# Patient Record
Sex: MALE | Race: ASIAN | NOT HISPANIC OR LATINO | URBAN - METROPOLITAN AREA
[De-identification: names, ages, dates, MRNs, and addresses within clinical notes are randomized per-mention and may not be internally consistent; named-entity substitution may affect disease eponyms.]

---

## 2019-01-23 ENCOUNTER — INPATIENT (INPATIENT)
Facility: HOSPITAL | Age: 69
LOS: 4 days | Discharge: HOME | End: 2019-01-28
Attending: INTERNAL MEDICINE | Admitting: INTERNAL MEDICINE
Payer: COMMERCIAL

## 2019-01-23 VITALS
HEART RATE: 119 BPM | TEMPERATURE: 98 F | SYSTOLIC BLOOD PRESSURE: 154 MMHG | DIASTOLIC BLOOD PRESSURE: 67 MMHG | RESPIRATION RATE: 16 BRPM | OXYGEN SATURATION: 100 %

## 2019-01-23 DIAGNOSIS — R91.8 OTHER NONSPECIFIC ABNORMAL FINDING OF LUNG FIELD: ICD-10-CM

## 2019-01-23 DIAGNOSIS — D64.9 ANEMIA, UNSPECIFIED: ICD-10-CM

## 2019-01-23 DIAGNOSIS — M54.9 DORSALGIA, UNSPECIFIED: ICD-10-CM

## 2019-01-23 DIAGNOSIS — Z29.9 ENCOUNTER FOR PROPHYLACTIC MEASURES, UNSPECIFIED: ICD-10-CM

## 2019-01-23 DIAGNOSIS — Z90.49 ACQUIRED ABSENCE OF OTHER SPECIFIED PARTS OF DIGESTIVE TRACT: Chronic | ICD-10-CM

## 2019-01-23 DIAGNOSIS — E83.52 HYPERCALCEMIA: ICD-10-CM

## 2019-01-23 LAB
ALBUMIN SERPL ELPH-MCNC: 3.2 G/DL — LOW (ref 3.3–5)
ALP SERPL-CCNC: 321 U/L — HIGH (ref 40–120)
ALT FLD-CCNC: 23 U/L — SIGNIFICANT CHANGE UP (ref 4–41)
ANION GAP SERPL CALC-SCNC: 12 MMO/L — SIGNIFICANT CHANGE UP (ref 7–14)
APPEARANCE UR: SIGNIFICANT CHANGE UP
APTT BLD: 35.3 SEC — SIGNIFICANT CHANGE UP (ref 27.5–36.3)
AST SERPL-CCNC: 21 U/L — SIGNIFICANT CHANGE UP (ref 4–40)
BACTERIA # UR AUTO: SIGNIFICANT CHANGE UP
BASOPHILS # BLD AUTO: 0.05 K/UL — SIGNIFICANT CHANGE UP (ref 0–0.2)
BASOPHILS NFR BLD AUTO: 0.5 % — SIGNIFICANT CHANGE UP (ref 0–2)
BILIRUB SERPL-MCNC: 0.7 MG/DL — SIGNIFICANT CHANGE UP (ref 0.2–1.2)
BILIRUB UR-MCNC: NEGATIVE — SIGNIFICANT CHANGE UP
BLOOD UR QL VISUAL: SIGNIFICANT CHANGE UP
BUN SERPL-MCNC: 17 MG/DL — SIGNIFICANT CHANGE UP (ref 7–23)
CALCIUM SERPL-MCNC: 11.5 MG/DL — HIGH (ref 8.4–10.5)
CHLORIDE SERPL-SCNC: 97 MMOL/L — LOW (ref 98–107)
CO2 SERPL-SCNC: 25 MMOL/L — SIGNIFICANT CHANGE UP (ref 22–31)
COLOR SPEC: YELLOW — SIGNIFICANT CHANGE UP
CREAT SERPL-MCNC: 0.88 MG/DL — SIGNIFICANT CHANGE UP (ref 0.5–1.3)
CRP SERPL-MCNC: 133.6 MG/L — HIGH
EOSINOPHIL # BLD AUTO: 0.13 K/UL — SIGNIFICANT CHANGE UP (ref 0–0.5)
EOSINOPHIL NFR BLD AUTO: 1.3 % — SIGNIFICANT CHANGE UP (ref 0–6)
ERYTHROCYTE [SEDIMENTATION RATE] IN BLOOD: 115 MM/HR — HIGH (ref 1–15)
GLUCOSE SERPL-MCNC: 136 MG/DL — HIGH (ref 70–99)
GLUCOSE UR-MCNC: NEGATIVE — SIGNIFICANT CHANGE UP
HCT VFR BLD CALC: 27.7 % — LOW (ref 39–50)
HGB BLD-MCNC: 7.7 G/DL — LOW (ref 13–17)
HYALINE CASTS # UR AUTO: SIGNIFICANT CHANGE UP
IMM GRANULOCYTES NFR BLD AUTO: 0.7 % — SIGNIFICANT CHANGE UP (ref 0–1.5)
INR BLD: 1.41 — HIGH (ref 0.88–1.17)
KETONES UR-MCNC: NEGATIVE — SIGNIFICANT CHANGE UP
LEUKOCYTE ESTERASE UR-ACNC: NEGATIVE — SIGNIFICANT CHANGE UP
LYMPHOCYTES # BLD AUTO: 1.9 K/UL — SIGNIFICANT CHANGE UP (ref 1–3.3)
LYMPHOCYTES # BLD AUTO: 18.6 % — SIGNIFICANT CHANGE UP (ref 13–44)
MCHC RBC-ENTMCNC: 20.9 PG — LOW (ref 27–34)
MCHC RBC-ENTMCNC: 27.8 % — LOW (ref 32–36)
MCV RBC AUTO: 75.1 FL — LOW (ref 80–100)
MONOCYTES # BLD AUTO: 0.85 K/UL — SIGNIFICANT CHANGE UP (ref 0–0.9)
MONOCYTES NFR BLD AUTO: 8.3 % — SIGNIFICANT CHANGE UP (ref 2–14)
NEUTROPHILS # BLD AUTO: 7.19 K/UL — SIGNIFICANT CHANGE UP (ref 1.8–7.4)
NEUTROPHILS NFR BLD AUTO: 70.6 % — SIGNIFICANT CHANGE UP (ref 43–77)
NITRITE UR-MCNC: NEGATIVE — SIGNIFICANT CHANGE UP
NRBC # FLD: 0 K/UL — LOW (ref 25–125)
PH UR: 6.5 — SIGNIFICANT CHANGE UP (ref 5–8)
PLATELET # BLD AUTO: 540 K/UL — HIGH (ref 150–400)
PMV BLD: 9.2 FL — SIGNIFICANT CHANGE UP (ref 7–13)
POTASSIUM SERPL-MCNC: 4.1 MMOL/L — SIGNIFICANT CHANGE UP (ref 3.5–5.3)
POTASSIUM SERPL-SCNC: 4.1 MMOL/L — SIGNIFICANT CHANGE UP (ref 3.5–5.3)
PROT SERPL-MCNC: 8.6 G/DL — HIGH (ref 6–8.3)
PROT UR-MCNC: 100 — HIGH
PROTHROM AB SERPL-ACNC: 15.8 SEC — HIGH (ref 9.8–13.1)
RBC # BLD: 3.69 M/UL — LOW (ref 4.2–5.8)
RBC # FLD: 18.1 % — HIGH (ref 10.3–14.5)
RBC CASTS # UR COMP ASSIST: SIGNIFICANT CHANGE UP (ref 0–?)
SODIUM SERPL-SCNC: 134 MMOL/L — LOW (ref 135–145)
SP GR SPEC: 1.02 — SIGNIFICANT CHANGE UP (ref 1–1.04)
SQUAMOUS # UR AUTO: SIGNIFICANT CHANGE UP
TSH SERPL-MCNC: 0.53 UIU/ML — SIGNIFICANT CHANGE UP (ref 0.27–4.2)
UROBILINOGEN FLD QL: SIGNIFICANT CHANGE UP
WBC # BLD: 10.19 K/UL — SIGNIFICANT CHANGE UP (ref 3.8–10.5)
WBC # FLD AUTO: 10.19 K/UL — SIGNIFICANT CHANGE UP (ref 3.8–10.5)
WBC UR QL: HIGH (ref 0–?)

## 2019-01-23 PROCEDURE — 71250 CT THORAX DX C-: CPT | Mod: 26

## 2019-01-23 PROCEDURE — 74177 CT ABD & PELVIS W/CONTRAST: CPT | Mod: 26

## 2019-01-23 PROCEDURE — 71046 X-RAY EXAM CHEST 2 VIEWS: CPT | Mod: 26

## 2019-01-23 PROCEDURE — 99223 1ST HOSP IP/OBS HIGH 75: CPT | Mod: GC

## 2019-01-23 RX ORDER — ENOXAPARIN SODIUM 100 MG/ML
40 INJECTION SUBCUTANEOUS DAILY
Qty: 0 | Refills: 0 | Status: DISCONTINUED | OUTPATIENT
Start: 2019-01-23 | End: 2019-01-25

## 2019-01-23 RX ORDER — SODIUM CHLORIDE 9 MG/ML
1000 INJECTION INTRAMUSCULAR; INTRAVENOUS; SUBCUTANEOUS ONCE
Qty: 0 | Refills: 0 | Status: COMPLETED | OUTPATIENT
Start: 2019-01-23 | End: 2019-01-23

## 2019-01-23 RX ORDER — ACETAMINOPHEN 500 MG
650 TABLET ORAL EVERY 6 HOURS
Qty: 0 | Refills: 0 | Status: DISCONTINUED | OUTPATIENT
Start: 2019-01-23 | End: 2019-01-28

## 2019-01-23 RX ADMIN — ENOXAPARIN SODIUM 40 MILLIGRAM(S): 100 INJECTION SUBCUTANEOUS at 21:32

## 2019-01-23 RX ADMIN — SODIUM CHLORIDE 1000 MILLILITER(S): 9 INJECTION INTRAMUSCULAR; INTRAVENOUS; SUBCUTANEOUS at 11:25

## 2019-01-23 NOTE — H&P ADULT - NSHPREVIEWOFSYSTEMS_GEN_ALL_CORE
REVIEW OF SYSTEMS:    CONSTITUTIONAL: No weakness, fatigue, malaise, fevers or chills, no weight change, appetite change  EYES: No visual changes; No double vision,  No vertigo, eye pain  Ears: no otalgia, no otorrhea, no hearing loss, tinnitus  Nose: no epistaxis, rhinorrhea, post-discharge, sinus pressure  Throat: no throat pain, no oral lesions, tooth pain   NECK: No pain or stiffness  RESPIRATORY: + productive cough; wheezing, hemoptysis; No shortness of breath, orthopnea, PND, CA, snoring  CARDIOVASCULAR: No chest pain or palpitations, no leg edema, no claudication    GASTROINTESTINAL: No abdominal or epigastric pain. No nausea, vomiting, or hematemesis; No diarrhea or constipation. No melena or hematochezia.  GENITOURINARY: No dysuria, frequency, urgency or hematuria, no pelvic pain, urinary incontinence, urgency  Musculoskeletal: no joints or muscle pain, no swelling in joints or muscles  NEUROLOGICAL: No numbness or weakness, headache, memory loss, seizures, dizziness, vertigo, syncope, ataxia  SKIN: No pruritis, rashes, lesions or new moles  Psych: No anxiety, sadness, insomnia, suicide thoughts  Endocrine: No Heat or Cold intolerance, polydipsia, polyphagia  Heme/Lymph: no LN enlargement, no easy bruising or bleeding REVIEW OF SYSTEMS:    CONSTITUTIONAL: +fatigue, +f/c , + weight change, + appetite change  EYES: No visual changes; No double vision,  No vertigo, eye pain  Ears: no otalgia, no otorrhea, no hearing loss, tinnitus  Nose: no epistaxis, rhinorrhea, post-discharge, sinus pressure  Throat: no throat pain, no oral lesions, tooth pain   NECK: No pain or stiffness  RESPIRATORY: + productive cough; no wheezing, hemoptysis; No shortness of breath, orthopnea, PND, CA, snoring  CARDIOVASCULAR: No chest pain or palpitations, no leg edema, no claudication    GASTROINTESTINAL: No abdominal or epigastric pain. No nausea, vomiting, or hematemesis; No diarrhea or constipation. No melena or hematochezia.  GENITOURINARY: + increased frequency, No dysuria, urgency or hematuria, no pelvic pain, urinary incontinence, urgency  Musculoskeletal: no joints or muscle pain, no swelling in joints or muscles  NEUROLOGICAL: No numbness or weakness, headache, memory loss, seizures, dizziness, vertigo, syncope, ataxia  SKIN: No pruritis, rashes, lesions or new moles  Psych: No anxiety, sadness, insomnia, suicide thoughts  Endocrine: No Heat or Cold intolerance, polydipsia, polyphagia  Heme/Lymph: no LN enlargement, no easy bruising or bleeding

## 2019-01-23 NOTE — ED PROVIDER NOTE - PROGRESS NOTE DETAILS
MACY Gan: Discussed with pt that his chest xray has findings concerning for pneumonia vs underlying issue, ordered for CT scan to further evaluate. Given lab and imaging results pt to be admitted for further workup. Discussed case with  hospitalist who accepts this pt to his service MACY aGn: [place] [phone], please call with any updates/when he should be picked up

## 2019-01-23 NOTE — H&P ADULT - ATTENDING COMMENTS
Patient seen and examined. Agree with above note by resident.    # Lung mass - likely malignant. Given weight loss, h/o smoking, cough, high suspicion for lung malignancy. Pulm evaluation for EBUS. CT abd/pel for staging.     # Hypercalcemia - suspect malignancy related. Asymptomatic. Monitor for now.     # Microcytic anemia - check iron studies. Patient should get colonoscopy as outpt.     Plan discussed with pt and hs

## 2019-01-23 NOTE — H&P ADULT - PROBLEM SELECTOR PLAN 3
Improve 0, will hold off on AC Pt w/ chronic dull infrascapular pain in setting of likely malignancy concerning for mets.  - f/u GGT  - f/u w/u of lung lesions  - pain control

## 2019-01-23 NOTE — ED PROVIDER NOTE - ATTENDING CONTRIBUTION TO CARE
Avila  pt [place]  pt  with 2 mos of back pain  cough wt loss    splenomegaly on exam   reported decreased po intake    Plan  labs include esr  CRP  coags  CXR [place]  pt  with 2 mos of back pain  cough wt loss    splenomegaly on exam   reported decreased po intake    Plan  labs include esr  CRP  coags  CXR    CXR  2 lesions lt base  atalectasis RUL  pulling up fissure (?endobronchial lesion)  CT chest ordered  admit for diagnostic w/u (?bronch)

## 2019-01-23 NOTE — H&P ADULT - PROBLEM SELECTOR PLAN 2
Pt w/ chronic dull infrascapular pain in setting of likely malignancy concerning for mets.  - alk phos and Ca elevated  - f/u GGT Ca 11.5 in setting of possible lung malignancy.  - asymptomatic, does not currently require intervention  - will monitor  - f/u PTH, PTHrP

## 2019-01-23 NOTE — H&P ADULT - HISTORY OF PRESENT ILLNESS
Pt is a 68 year old man w/ a PMHx GERD presenting w/ progressive back pain x4 months. Pt is a 68 year old man w/ a PMHx GERD, smoking presenting w/ progressive back pain x4 months. Pt states that the back pain is dull, intermittent, and located inferior to his right scapula, and occasinally wraps around to the side of his chest. Pt states that the pain has gotten worse over the past few months, and it worsens with movement. It frequently awakens him from sleep, but improves with two Advil liquid capsules. Pt also endorses multiple months of an intermittent cough productive of yellow sputum. Pt went to see a doctor 3 months ago for evaluation of the cough at which time his weight was >20 lbs less than his baseline. He becomes sob with activity but not at rest. Pt states he also has a decreased appetite. He also has occasional subjective fevers/chills. He has also had a recent polyuria, waking every 3 hours to urinate at night. The patient quit his job as a  three months ago on account of his symptoms. Pt denies cp, changes in BMs, dysuria, abdominal pain, HAs, n/v.     In the ED, VS: Temp 98, , /67, RR 16 SpO2 100%. , .6. Alk phos 321, Ca 11.5. CXR showed possible RUL PNA and multiple opacities. CT chest: RUL and LLL lesion, numerous opacities, hilar lymphadenopathy, b/l adrenal mets.

## 2019-01-23 NOTE — ED PROVIDER NOTE - OBJECTIVE STATEMENT
Pt  presenting with 2 mos of intermittent back pain not positional  not clearly related to food  Claims improves with po tagamet  but never goes away  Pt also notes persistent intermittent cough  occasionally productive  notes subjective fever and sweats  40 lb wt loss over the last several months  some decreased po  intake  no change in bowels  no vomiting   no new CA  no associated chest pain  ([place])

## 2019-01-23 NOTE — H&P ADULT - NSHPPHYSICALEXAM_GEN_ALL_CORE
PHYSICAL EXAM:  GENERAL: NAD, well-groomed, well-developed  HEAD:  Atraumatic, Normocephalic  EYES: EOMI, PERRLA, conjunctiva and sclera clear  ENMT: No tonsillar erythema, exudates, or enlargement; Moist mucous membranes  NECK: Supple, No JVD, Normal thyroid  HEART: Regular rate and rhythm; No murmurs, rubs, or gallops  RESPIRATORY: CTA B/L, No W/R/R  ABDOMEN: Soft, Nontender, Nondistended; Bowel sounds present  NEUROLOGY: A&Ox3, nonfocal, moving all extremities  EXTREMITIES:  2+ Peripheral Pulses, No clubbing, cyanosis, or edema  SKIN: warm, dry, normal color, no rash or abnormal lesions PHYSICAL EXAM:  GENERAL: NAD, well-groomed, frequently stops talking to cough  HEAD:  Atraumatic, Normocephalic  EYES: EOMI, PERRLA, conjunctiva and sclera clear  ENMT: No tonsillar erythema, exudates, or enlargement; Moist mucous membranes  NECK: Supple, No JVD, Normal thyroid  HEART: Regular rate and rhythm; No murmurs, rubs, or gallops  RESPIRATORY: RUL crackles  ABDOMEN: Soft, Nontender, Nondistended; Bowel sounds present. Enlarged spleen  NEUROLOGY: A&Ox3, nonfocal, moving all extremities  EXTREMITIES:  2+ Peripheral Pulses, No clubbing, cyanosis, or edema  SKIN: warm, dry, normal color, no rash or abnormal lesions  BACK: no localized tenderness to palpation

## 2019-01-23 NOTE — ED PROVIDER NOTE - MEDICAL DECISION MAKING DETAILS
pt with wt loss persistent cough abnl CXR  splenomegaly  concerning for CA  CT chest ordered  will admit for likely bronch/further treatment

## 2019-01-23 NOTE — H&P ADULT - NSHPLABSRESULTS_GEN_ALL_CORE
LABS:                         7.7    10.19 )-----------( 540      ( 2019 10:50 )             27.7         134<L>  |  97<L>  |  17  ----------------------------<  136<H>  4.1   |  25  |  0.88    Ca    11.5<H>      2019 10:50    TPro  8.6<H>  /  Alb  3.2<L>  /  TBili  0.7  /  DBili  x   /  AST  21  /  ALT  23  /  AlkPhos  321<H>      PT/INR - ( 2019 10:51 )   PT: 15.8 SEC;   INR: 1.41          PTT - ( 2019 10:51 )  PTT:35.3 SEC  Urinalysis Basic - ( 2019 10:55 )    Color: YELLOW / Appearance: Lt TURBID / S.023 / pH: 6.5  Gluc: NEGATIVE / Ketone: NEGATIVE  / Bili: NEGATIVE / Urobili: TRACE   Blood: TRACE / Protein: 100 / Nitrite: NEGATIVE   Leuk Esterase: NEGATIVE / RBC: 3-5 / WBC 6-10   Sq Epi: FEW / Non Sq Epi: x / Bacteria: FEW      EXAM:  XR CHEST PA LAT 2V        PROCEDURE DATE:  2019         INTERPRETATION:  TIME OF EXAM: 2019 at 11:34 AM    CLINICAL INFORMATION: Cough and weight loss    TECHNIQUE:   PA and lateral chest    INTERPRETATION:     There is mild elevation of the horizontal fissure above which is an   airspace opacity presumably representing pneumonia.    There are faint rounded opacities at the left lung base of uncertain   etiology. The heart is not enlarged and there are no effusions although   bilateral hilar appear somewhat prominent.    CT chest is recommended for further evaluation of what may be right upper   lobe pneumonia but better delineation of the suspected nodules at the   left lung base.      COMPARISON:  None available      IMPRESSION:  Possible right upper lobe pneumonia but additional opacities   are seen on the radiograph that are of uncertain etiology and CT chest is   recommended particularly in the presence of significant weight loss.

## 2019-01-23 NOTE — H&P ADULT - ASSESSMENT
Pt is a 68 year old man w/ a PMHx GERD presenting w/ progressive back pain x4 months. Pt is a 68 year old man w/ a PMHx GERD presenting w/ progressive back pain x4 months. CT chest showing large RUL mass and LLL mass, numerous b/l nodular opacities, and mediastinal hilar lymphadenopathy concerning for malignancy.

## 2019-01-23 NOTE — H&P ADULT - PROBLEM SELECTOR PLAN 1
Pt w/ new productive cough in setting of extensive smoking hx.   - CT chest shows large RUL mass w/ left lower lobe mass, innumerable bilateral nodular opacities, and mediastinal/bilateral hilar lymphadenopathy. Also w/ b/l adrenal metastases.   - primary lung malignancy vs mets  - will c/s pulm re possible EBUS Pt w/ new productive cough in setting of extensive smoking hx.   - CT chest shows large RUL mass w/ left lower lobe mass, innumerable bilateral nodular opacities, and mediastinal/bilateral hilar lymphadenopathy. Also w/ b/l adrenal metastases.   - primary lung malignancy vs mets  - will c/s pulm re possible EBUS   - CT A/P to evaluate extent of disease

## 2019-01-23 NOTE — H&P ADULT - NSHPSOCIALHISTORY_GEN_ALL_CORE
Pt has a 40 year smoking history. He currently smoked a pack per day. Pt stopped working as a  3 months ago on account of his symptoms. He is originally from [facility]. Lives alone. Pt has a 40 year smoking history. He currently smoked a pack per day. He is an occasional alcohol drinker. No illicit drug use.

## 2019-01-23 NOTE — H&P ADULT - PROBLEM SELECTOR PLAN 4
Improve 2, Lovenox  Diet: regular Microcytic. Pt has never had a colonoscopy.   - will f/u iron panel

## 2019-01-23 NOTE — ED ADULT NURSE NOTE - OBJECTIVE STATEMENT
Patient presents to room 29 with c/o 2 mos of intermittent back pain    Claims improves with po tagamet  but never goes away  Pt also notes persistent intermittent cough  occasionally productive  notes subjective fever and sweats  40 lb wt loss over the last several months  some decreased po  intake. Patient is alert and oriented times three. IVL placed to right AC 20 gauge and labs drawn and sent. ND infused as ordered and waiting for bed assignment.  DAJUAN Carrion

## 2019-01-24 LAB
ALBUMIN SERPL ELPH-MCNC: 2.8 G/DL — LOW (ref 3.3–5)
ALP SERPL-CCNC: 308 U/L — HIGH (ref 40–120)
ALT FLD-CCNC: 17 U/L — SIGNIFICANT CHANGE UP (ref 4–41)
ANION GAP SERPL CALC-SCNC: 13 MMO/L — SIGNIFICANT CHANGE UP (ref 7–14)
AST SERPL-CCNC: 21 U/L — SIGNIFICANT CHANGE UP (ref 4–40)
BASOPHILS # BLD AUTO: 0.05 K/UL — SIGNIFICANT CHANGE UP (ref 0–0.2)
BASOPHILS NFR BLD AUTO: 0.5 % — SIGNIFICANT CHANGE UP (ref 0–2)
BILIRUB SERPL-MCNC: 0.7 MG/DL — SIGNIFICANT CHANGE UP (ref 0.2–1.2)
BLD GP AB SCN SERPL QL: NEGATIVE — SIGNIFICANT CHANGE UP
BUN SERPL-MCNC: 15 MG/DL — SIGNIFICANT CHANGE UP (ref 7–23)
CALCIUM SERPL-MCNC: 10.3 MG/DL — SIGNIFICANT CHANGE UP (ref 8.4–10.5)
CHLORIDE SERPL-SCNC: 101 MMOL/L — SIGNIFICANT CHANGE UP (ref 98–107)
CO2 SERPL-SCNC: 22 MMOL/L — SIGNIFICANT CHANGE UP (ref 22–31)
CREAT SERPL-MCNC: 0.94 MG/DL — SIGNIFICANT CHANGE UP (ref 0.5–1.3)
EOSINOPHIL # BLD AUTO: 0.11 K/UL — SIGNIFICANT CHANGE UP (ref 0–0.5)
EOSINOPHIL NFR BLD AUTO: 1.1 % — SIGNIFICANT CHANGE UP (ref 0–6)
FERRITIN SERPL-MCNC: 1546 NG/ML — HIGH (ref 30–400)
GGT SERPL-CCNC: 110 U/L — HIGH (ref 8–61)
GLUCOSE SERPL-MCNC: 84 MG/DL — SIGNIFICANT CHANGE UP (ref 70–99)
HAPTOGLOB SERPL-MCNC: 446 MG/DL — HIGH (ref 34–200)
HCT VFR BLD CALC: 23.9 % — LOW (ref 39–50)
HCV AB S/CO SERPL IA: 0.28 S/CO — SIGNIFICANT CHANGE UP
HCV AB SERPL-IMP: SIGNIFICANT CHANGE UP
HGB BLD-MCNC: 7 G/DL — CRITICAL LOW (ref 13–17)
IMM GRANULOCYTES NFR BLD AUTO: 0.5 % — SIGNIFICANT CHANGE UP (ref 0–1.5)
IRON SATN MFR SERPL: 16 UG/DL — LOW (ref 45–165)
IRON SATN MFR SERPL: 170 UG/DL — SIGNIFICANT CHANGE UP (ref 155–535)
LDH SERPL L TO P-CCNC: 129 U/L — LOW (ref 135–225)
LYMPHOCYTES # BLD AUTO: 2.46 K/UL — SIGNIFICANT CHANGE UP (ref 1–3.3)
LYMPHOCYTES # BLD AUTO: 24.9 % — SIGNIFICANT CHANGE UP (ref 13–44)
MAGNESIUM SERPL-MCNC: 1.8 MG/DL — SIGNIFICANT CHANGE UP (ref 1.6–2.6)
MCHC RBC-ENTMCNC: 21.8 PG — LOW (ref 27–34)
MCHC RBC-ENTMCNC: 29.3 % — LOW (ref 32–36)
MCV RBC AUTO: 74.5 FL — LOW (ref 80–100)
MONOCYTES # BLD AUTO: 1.23 K/UL — HIGH (ref 0–0.9)
MONOCYTES NFR BLD AUTO: 12.4 % — SIGNIFICANT CHANGE UP (ref 2–14)
NEUTROPHILS # BLD AUTO: 5.98 K/UL — SIGNIFICANT CHANGE UP (ref 1.8–7.4)
NEUTROPHILS NFR BLD AUTO: 60.6 % — SIGNIFICANT CHANGE UP (ref 43–77)
NRBC # FLD: 0 K/UL — LOW (ref 25–125)
PHOSPHATE SERPL-MCNC: 3.5 MG/DL — SIGNIFICANT CHANGE UP (ref 2.5–4.5)
PLATELET # BLD AUTO: 472 K/UL — HIGH (ref 150–400)
PMV BLD: 9 FL — SIGNIFICANT CHANGE UP (ref 7–13)
POTASSIUM SERPL-MCNC: 4.3 MMOL/L — SIGNIFICANT CHANGE UP (ref 3.5–5.3)
POTASSIUM SERPL-SCNC: 4.3 MMOL/L — SIGNIFICANT CHANGE UP (ref 3.5–5.3)
PROT SERPL-MCNC: 6.6 G/DL — SIGNIFICANT CHANGE UP (ref 6–8.3)
PTH-INTACT SERPL-MCNC: 11.23 PG/ML — LOW (ref 15–65)
RBC # BLD: 3.21 M/UL — LOW (ref 4.2–5.8)
RBC # FLD: 18.4 % — HIGH (ref 10.3–14.5)
RETICS #: 80 K/UL — SIGNIFICANT CHANGE UP (ref 25–125)
RETICS/RBC NFR: 2.5 % — SIGNIFICANT CHANGE UP (ref 0.5–2.5)
RH IG SCN BLD-IMP: POSITIVE — SIGNIFICANT CHANGE UP
SODIUM SERPL-SCNC: 136 MMOL/L — SIGNIFICANT CHANGE UP (ref 135–145)
TRANSFERRIN SERPL-MCNC: 140 MG/DL — LOW (ref 200–360)
UIBC SERPL-MCNC: 154.1 UG/DL — SIGNIFICANT CHANGE UP (ref 110–370)
WBC # BLD: 9.88 K/UL — SIGNIFICANT CHANGE UP (ref 3.8–10.5)
WBC # FLD AUTO: 9.88 K/UL — SIGNIFICANT CHANGE UP (ref 3.8–10.5)

## 2019-01-24 PROCEDURE — 99222 1ST HOSP IP/OBS MODERATE 55: CPT

## 2019-01-24 PROCEDURE — 70553 MRI BRAIN STEM W/O & W/DYE: CPT | Mod: 26

## 2019-01-24 PROCEDURE — 99233 SBSQ HOSP IP/OBS HIGH 50: CPT | Mod: GC

## 2019-01-24 PROCEDURE — 99223 1ST HOSP IP/OBS HIGH 75: CPT | Mod: GC

## 2019-01-24 RX ADMIN — ENOXAPARIN SODIUM 40 MILLIGRAM(S): 100 INJECTION SUBCUTANEOUS at 12:07

## 2019-01-24 NOTE — PROGRESS NOTE ADULT - PROBLEM SELECTOR PLAN 3
Pt w/ chronic dull infrascapular pain in setting of likely malignancy concerning for mets.  - GGT elevated  - f/u w/u of lung lesions  - pain control

## 2019-01-24 NOTE — CONSULT NOTE ADULT - SUBJECTIVE AND OBJECTIVE BOX
CC: 68y old Male admitted with a chief complaint of R flank pain.      HPI: 67 yo male with a significant 45 pack year smoking history and PMH of GERD and appendicitis presents with cough and right sided flank pain. Patient states over the last few weeks he has been having persistent, dull-like pain in his lower back, primarily on the right side. Pain is consistent throughout the day and nothing makes it better or worse.  He has noticed an increase in urinary frequency and describes his urine as yellowish-clear. Also has been experiencing fever/chills, with associated night sweats for past few weeks. He has had a significant unintentional weight loss. Unsure of how much he has lost, but notes pants are now fitting too loose for him. He states he had his "kidney tests" done at his PCP several years ago and they were abnormal. PCP told him to discontinue alcohol consumption at the time and he has not drank alcohol since. Went to PCP for evaluation of cough. Diagnosed with Pneumonia and started on ABX. States cough resolved after course of antibiotics. Still experiencing some mild nasal congestion.   Denies hematuria, dysuria, abdominal pain, nausea/vomiting/diarrhea/constipation, headaches, visual changes, change in appetite or hemoptysis.    PMHx: GERD (gastroesophageal reflux disease)  Appendicitis    PSHx: History of appendectomy    Medications (inpatient): enoxaparin Injectable 40 milliGRAM(s) SubCutaneous daily    Medications (PRN):acetaminophen   Tablet .. 650 milliGRAM(s) Oral every 6 hours PRN    Allergies: No Known Allergies    Family History: +Ovarian CA in mother (Dx at 72)    Social Hx: Worked as a  with significant exposure to chemicals. + Tobacco Use: 45 pack years. No alcohol or illicit drug use.     Physical Exam  T(C): 36.9 (19 @ 14:53)  HR: 95 (19 @ 14:53) (95 - 104)  BP: 124/63 (19 @ 14:53) (111/59 - 133/60)  RR: 18 (19 @ 14:53) (17 - 18)  SpO2: 98% (19 @ 14:53) (96% - 100%)  Tmax: T(C): , Max: 36.9 (19 @ 14:53)    General: well developed, well nourished, NAD  Neuro: alert and oriented, no focal deficits, moves all extremities spontaneously  HEENT: NCAT, EOMI, anicteric, mucosa moist  Respiratory: airway patent, respirations unlabored  CVS: regular rate and rhythm  Abdomen: soft, nontender, nondistended. No CVA tenderness. + Palpable solid mass on left flank.  : No suprapubic tenderness. No tenderness to palpation to testes b/l. Palpable vas deferens b/l. Glans intact, no lesions. No discharge from meatus.   QI: No prostate tenderness. Prostate nonenlarged.   Extremities: no edema, sensation and movement grossly intact  Skin: warm, dry, appropriate color    Labs:                        7.0    9.88  )-----------( 472      ( 2019 06:00 )             23.9     PT/INR - ( 2019 10:51 )   PT: 15.8 SEC;   INR: 1.41          PTT - ( 2019 10:51 )  PTT:35.3 SEC      136  |  101  |  15  ----------------------------<  84  4.3   |  22  |  0.94    Ca    10.3      2019 06:00  Phos  3.5       Mg     1.8         TPro  6.6  /  Alb  2.8<L>  /  TBili  0.7  /  DBili  x   /  AST  21  /  ALT  17  /  AlkPhos  308<H>      Urinalysis Basic - ( 2019 10:55 )    Color: YELLOW / Appearance: Lt TURBID / S.023 / pH: 6.5  Gluc: NEGATIVE / Ketone: NEGATIVE  / Bili: NEGATIVE / Urobili: TRACE   Blood: TRACE / Protein: 100 / Nitrite: NEGATIVE   Leuk Esterase: NEGATIVE / RBC: 3-5 / WBC 6-10   Sq Epi: FEW / Non Sq Epi: x / Bacteria: FEW      Imaging and other studies:  < from: CT Abdomen and Pelvis w/ IV Cont (19 @ 22:49) >  FINDINGS:    LOWER CHEST: Numerous lung nodules and lung masses are again noted. There   is a necrotic appearing right paraesophageal lymph node measuring 1.5 x   1.3 cm.    LIVER: Within normal limits.  BILE DUCTS: Normal caliber.  GALLBLADDER: Contracted.   SPLEEN: Within normal limits.  PANCREAS: Within normal limits.  ADRENALS: Bilateral adrenal nodules measuring for example 2.0 cm on the   right side.  KIDNEYS/URETERS: Necrotic appearing mass inseparable from the lower pole   the left kidney measuring 10.7 x 11.1 x 8.4 cm (2, 62 and 602, 45).    BLADDER: Within normal limits.  REPRODUCTIVE ORGANS: The prostate gland and seminal vesicles appear   unremarkable.    BOWEL: No bowel obstruction.   PERITONEUM: No ascites.  VESSELS:  Within normal limits.  RETROPERITONEUM: Left paracentral aortic lymphadenopathy with a lymph   node pushing the left renal vein anteriorly measuring 2.5 x 2.3 cm (2,   45).  ABDOMINAL WALL: Small fat-containing umbilical hernia.  BONES: Calcification posteriorly at L4-5.    IMPRESSION:   Bilateral lung base nodules and masses again noted.  11.1 cm necrotic mass inseparable from the lower pole of the left kidney.  Bilateral adrenal nodules and left para-aortic lymphadenopathy. CC: 68y old Male admitted with a chief complaint of R flank pain.      HPI: 67 yo male with a PMHx of GERD and ? ruptured appendicitis presented with cough and right sided flank pain. Patient states over the last few weeks he has been having persistent, dull-like pain in his mid back, primarily on the right side. Pain is consistent throughout the day and nothing makes it better or worse.  He has noticed an increase in urinary frequency and describes his urine as yellowish-clear. Also has been experiencing fever/chills, with associated night sweats for past few weeks. He has had a significant unintentional weight loss, ~20-30 lbs.  He also noted mild dyspnea on exertion.  He states he had his "kidney tests" done at his PCP several years ago and they were abnormal. PCP told him to discontinue alcohol consumption at the time and he has not drank alcohol since. Went to PCP for evaluation of cough. Diagnosed with pneumonia and started on abx. States cough resolved after course of abx.  Denies hemoptysis, though reports some sputum production.  Denies hematuria, dysuria, abdominal pain, nausea/vomiting/diarrhea/constipation, headaches, visual changes, and change in appetite.      On CT c/a/p, pt noted to have b/l lung masses, paraesophageal lymphadenopathy b/l adrenal masses, and a large L renal mass all concerning for malignancy.  Of note, pt has a 45 pack-year smoking history as well as exposures to "many chemicals" due to is profession ().  Denies family hx of lung, renal, and prostate ca.  Reports that his mother  in her 70s from ovarian ca, but no one else in his family has been diagnosed w/ cancer to his knowledge.      PMHx: GERD (gastroesophageal reflux disease)  Appendicitis    PSHx: History of appendectomy    Medications (inpatient): enoxaparin Injectable 40 milliGRAM(s) SubCutaneous daily    Medications (PRN):acetaminophen   Tablet .. 650 milliGRAM(s) Oral every 6 hours PRN    Allergies: No Known Allergies    Family History: +Ovarian CA in mother (Dx at 72)    Social Hx: Worked as a  with significant exposure to chemicals. + Tobacco Use: 45 pack years. No alcohol or illicit drug use.     Physical Exam  T(C): 36.9 (19 @ 14:53)  HR: 95 (19 @ 14:53) (95 - 104)  BP: 124/63 (19 @ 14:53) (111/59 - 133/60)  RR: 18 (19 @ 14:53) (17 - 18)  SpO2: 98% (19 @ 14:53) (96% - 100%)  Tmax: T(C): , Max: 36.9 (19 @ 14:53)    General: well developed, well nourished, NAD  Neuro: alert and oriented, no focal deficits, moves all extremities spontaneously  HEENT: NCAT, EOMI, anicteric, mucosa moist  Respiratory: respirations unlabored  Abdomen: soft, nontender, nondistended. No CVA tenderness b/l, + Palpable, non-tender solid mass on left flank  : No suprapubic tenderness. No tenderness to palpation to testes b/l. Palpable vas deferens b/l. No testicular masses, Glans intact, no lesions. No discharge from meatus.   QI: No prostate tenderness. Prostate mildly enlarged prostate.   Extremities: no edema, sensation and movement grossly intact  Skin: warm, dry, appropriate color    Labs:                        7.0    9.88  )-----------( 472      ( 2019 06:00 )             23.9     PT/INR - ( 2019 10:51 )   PT: 15.8 SEC;   INR: 1.41          PTT - ( 2019 10:51 )  PTT:35.3 SEC      136  |  101  |  15  ----------------------------<  84  4.3   |  22  |  0.94    Ca    10.3      2019 06:00  Phos  3.5       Mg     1.8         TPro  6.6  /  Alb  2.8<L>  /  TBili  0.7  /  DBili  x   /  AST  21  /  ALT  17  /  AlkPhos  308<H>      Urinalysis Basic - ( 2019 10:55 )    Color: YELLOW / Appearance: Lt TURBID / S.023 / pH: 6.5  Gluc: NEGATIVE / Ketone: NEGATIVE  / Bili: NEGATIVE / Urobili: TRACE   Blood: TRACE / Protein: 100 / Nitrite: NEGATIVE   Leuk Esterase: NEGATIVE / RBC: 3-5 / WBC 6-10   Sq Epi: FEW / Non Sq Epi: x / Bacteria: FEW      Imaging and other studies:    < from: CT Abdomen and Pelvis w/ IV Cont (19 @ 22:49) >  FINDINGS:    LOWER CHEST: Numerous lung nodules and lung masses are again noted. There is a necrotic appearing right paraesophageal lymph node measuring 1.5 x 1.3 cm.    LIVER: Within normal limits.  BILE DUCTS: Normal caliber.  GALLBLADDER: Contracted.   SPLEEN: Within normal limits.  PANCREAS: Within normal limits.  ADRENALS: Bilateral adrenal nodules measuring for example 2.0 cm on the right side.  KIDNEYS/URETERS: Necrotic appearing mass inseparable from the lower pole the left kidney measuring 10.7 x 11.1 x 8.4 cm (2, 62 and 602, 45).    BLADDER: Within normal limits.  REPRODUCTIVE ORGANS: The prostate gland and seminal vesicles appear unremarkable.    BOWEL: No bowel obstruction.   PERITONEUM: No ascites.  VESSELS:  Within normal limits.  RETROPERITONEUM: Left paracentral aortic lymphadenopathy with a lymph node pushing the left renal vein anteriorly measuring 2.5 x 2.3 cm (2, 45).  ABDOMINAL WALL: Small fat-containing umbilical hernia.  BONES: Calcification posteriorly at L4-5.    IMPRESSION:   Bilateral lung base nodules and masses again noted.  11.1 cm necrotic mass inseparable from the lower pole of the left kidney.  Bilateral adrenal nodules and left para-aortic lymphadenopathy.

## 2019-01-24 NOTE — PROGRESS NOTE ADULT - ASSESSMENT
Pt is a 68 year old man w/ a PMHx GERD presenting w/ progressive back pain x4 months. CT chest showing large RUL mass and LLL mass, numerous b/l nodular opacities, and mediastinal hilar lymphadenopathy concerning for malignancy.

## 2019-01-24 NOTE — CONSULT NOTE ADULT - SUBJECTIVE AND OBJECTIVE BOX
HPI:  68M w/ PMH of GERD, smoking presenting w/ progressive back pain x4 months. Pt states that the back pain is dull, intermittent, and located inferior to his right scapula, and occasionally wraps around to the side of his chest. Pt states that the pain has gotten worse over the past few months, and it worsens with movement. It frequently awakens him from sleep, but improves with two Advil liquid capsules. Pt also endorses multiple months of an intermittent cough productive of yellow sputum. Pt went to see a doctor 3 months ago for evaluation of the cough at which time his weight was >20 lbs less than his baseline. He becomes sob with activity but not at rest. Pt states he also has a decreased appetite. He also has occasional subjective fevers/chills. He has also had a recent polyuria, waking every 3 hours to urinate at night. The patient quit his job as a  three months ago on account of his symptoms.  CXR showed possible RUL PNA and multiple opacities. CT chest: RUL and LLL lesion, numerous opacities, hilar lymphadenopathy, b/l adrenal mets.         PAST MEDICAL & SURGICAL HISTORY:  GERD (gastroesophageal reflux disease)  Appendicitis  History of appendectomy      Allergies  No Known Allergies        MEDICATIONS  (STANDING):  enoxaparin Injectable 40 milliGRAM(s) SubCutaneous daily    MEDICATIONS  (PRN):  acetaminophen   Tablet .. 650 milliGRAM(s) Oral every 6 hours PRN Mild Pain (1 - 3), Moderate Pain (4 - 6)      FAMILY HISTORY:  No pertinent family history in first degree relatives      SOCIAL HISTORY: No EtOH, no tobacco    REVIEW OF SYSTEMS:    CONSTITUTIONAL: No weakness, fevers or chills  EYES/ENT: No visual changes;  No vertigo or throat pain   NECK: No pain or stiffness  RESPIRATORY: No cough, wheezing, hemoptysis; No shortness of breath  CARDIOVASCULAR: No chest pain or palpitations  GASTROINTESTINAL: No abdominal or epigastric pain. No nausea, vomiting, or hematemesis; No diarrhea or constipation. No melena or hematochezia.  GENITOURINARY: No dysuria, frequency or hematuria  NEUROLOGICAL: No numbness or weakness  SKIN: No itching, burning, rashes, or lesions   All other review of systems is negative unless indicated above.    Height (cm): 180.34 (01-23 @ 19:04)  Weight (kg): 75.4 (01-23 @ 19:04)  BMI (kg/m2): 23.2 (01-23 @ 19:04)  BSA (m2): 1.95 (01-23 @ 19:04)    T(F): 98.3 (01-24-19 @ 06:04), Max: 98.3 (01-24-19 @ 06:04)  HR: 100 (01-24-19 @ 06:04)  BP: 111/59 (01-24-19 @ 06:04)  RR: 17 (01-24-19 @ 06:04)  SpO2: 96% (01-24-19 @ 06:04)  Wt(kg): --    GENERAL: NAD, well-developed  HEAD:  Atraumatic, Normocephalic  EYES: EOMI, PERRLA, conjunctiva and sclera clear  NECK: Supple, No JVD  CHEST/LUNG: Clear to auscultation bilaterally; No wheeze  HEART: Regular rate and rhythm; No murmurs, rubs, or gallops  ABDOMEN: Soft, Nontender, Nondistended; Bowel sounds present  EXTREMITIES:  2+ Peripheral Pulses, No clubbing, cyanosis, or edema  NEUROLOGY: non-focal  SKIN: No rashes or lesions                          7.0    9.88  )-----------( 472      ( 24 Jan 2019 06:00 )             23.9       01-24    136  |  101  |  15  ----------------------------<  84  4.3   |  22  |  0.94    Ca    10.3      24 Jan 2019 06:00  Phos  3.5     01-24  Mg     1.8     01-24    TPro  6.6  /  Alb  2.8<L>  /  TBili  0.7  /  DBili  x   /  AST  21  /  ALT  17  /  AlkPhos  308<H>  01-24      Phosphorus Level, Serum: 3.5 mg/dL (01-24 @ 06:00)  Magnesium, Serum: 1.8 mg/dL (01-24 @ 06:00)  Lactate Dehydrogenase, Serum: 129 U/L (01-24 @ 06:00)      PT/INR - ( 23 Jan 2019 10:51 )   PT: 15.8 SEC;   INR: 1.41          PTT - ( 23 Jan 2019 10:51 )  PTT:35.3 SEC      RADIOLOGY:  EXAM:  CT CHEST      PROCEDURE DATE:  Jan 23 2019     INTERPRETATION:  CLINICAL INFORMATION: Intermittent back pain for 2 months, tobacco use, weight loss.    COMPARISON: Chest radiograph 1/23/2019.    PROCEDURE:   CT of the Chest was performed without intravenous contrast.  Sagittal and coronal reformats were performed.    FINDINGS:    LUNGS AND LARGE AIRWAYS: Debris/secretions within the trachea/left main bronchus. Masslike/consolidative right upper lobe opacity with narrowing/occlusion of the anterior/posterior segmental bronchi of the right upper lobe, measuring approximately 6.9 x 7.3 cm on series 2, image 42. Many additional masses and nodular opacities throughout the lungs bilaterally. For reference, a left lower lobe mass measures 4.1 x 3.4 cm (2:87), and a right lower lobe nodule measures 1.5 cm (2:87).  PLEURA: No pleural effusion.  VESSELS: Mild atherosclerotic changes. Left three-vessel arch.  HEART: Heart size is normal. No pericardial effusion.  MEDIASTINUM AND CASEY: Mediastinal and bilateral hilar lymphadenopathy. For reference, a right paratracheal node measures 2.7 x 1.7 cm (2:28).  CHEST WALL AND LOWER NECK: Within normal limits.  VISUALIZED UPPER ABDOMEN: 2.0 cm right and 1.8 cm left adrenal metastases.  BONES: Mild degenerative changes.    IMPRESSION:   A large right upper lobe mass with additional left lower lobe mass, innumerable bilateral nodular opacities, and mediastinal/bilateral hilar lymphadenopathy.  Bilateral adrenal metastases.      EXAM:  CT ABDOMEN AND PELVIS IC      PROCEDURE DATE:  Jan 23 2019     INTERPRETATION:  CLINICAL INFORMATION: New lung mass, assess further metastases.         COMPARISON: CT scan of the chest from the same day.    PROCEDURE:   CT of the Abdomen and Pelvis was performed with intravenous contrast.   Intravenous contrast: 90 ml Omnipaque 350. 10 ml discarded.  Oral contrast: None.  Sagittal and coronal reformats were performed.    FINDINGS:    LOWER CHEST: Numerous lung nodules and lung masses are again noted. There is a necrotic appearing right paraesophageal lymph node measuring 1.5 x 1.3 cm.    LIVER: Within normal limits.  BILE DUCTS: Normal caliber.  GALLBLADDER: Contracted.   SPLEEN: Within normal limits.  PANCREAS: Within normal limits.  ADRENALS: Bilateral adrenal nodules measuring for example 2.0 cm on the right side.  KIDNEYS/URETERS: Necrotic appearing mass inseparable from the lower pole the left kidney measuring 10.7 x 11.1 x 8.4 cm (2, 62 and 602, 45).    BLADDER: Within normal limits.  REPRODUCTIVE ORGANS: The prostate gland and seminal vesicles appear unremarkable.    BOWEL: No bowel obstruction.   PERITONEUM: No ascites.  VESSELS:  Within normal limits.  RETROPERITONEUM: Left paracentral aortic lymphadenopathy with a lymph node pushing the left renal vein anteriorly measuring 2.5 x 2.3 cm (2, 45).  ABDOMINAL WALL: Small fat-containing umbilical hernia.  BONES: Calcification posteriorly at L4-5.    IMPRESSION:   Bilateral lung base nodules and masses again noted.  11.1 cm necrotic mass inseparable from the lower pole of the left kidney.  Bilateral adrenal nodules and left para-aortic lymphadenopathy.                      MEAGAN GROSS M.D., ATTENDING RADIOLOGIST  This document has beenelectronically signed. Jan 24 2019  9:13AM                  < end of copied text >                    GROVER PAREKH M.D., RADIOLOGY RESIDENT  This document has been electronically signed.  SYL BALDERRAMA M.D., ATTENDING RADIOLOGIST  This document has been electronically signed. Jan 23 2019  2:26PM HPI:  68M w/ PMH of GERD, smoking presenting w/ progressive back pain x4 months. Pt states that the back pain is dull, intermittent, and located inferior to his right scapula, and occasionally wraps around to the side of his chest. Pt states that the pain has gotten worse over the past few months, and it worsens with movement. It frequently awakens him from sleep, but improves with two Advil liquid capsules. Pt also endorses multiple months of an intermittent cough productive of yellow sputum. Pt went to see a doctor 3 months ago for evaluation of the cough at which time his weight was >20 lbs less than his baseline. He becomes sob with activity but not at rest. Pt states he also has a decreased appetite. He also has occasional subjective fevers/chills. He has also had a recent polyuria, waking every 3 hours to urinate at night. The patient quit his job as a  three months ago on account of his symptoms.  CXR showed possible RUL PNA and multiple opacities. CT chest: RUL and LLL lesion, numerous opacities, hilar lymphadenopathy, b/l adrenal mets.       Patient has never had a colonoscopy.  Greater than 40 pack year smoking history.  Is a retired .  No family locally, but a strong friend support system.  Daughter lives in [place].       PAST MEDICAL & SURGICAL HISTORY:  GERD (gastroesophageal reflux disease)  Appendicitis  History of appendectomy      Allergies  No Known Allergies        MEDICATIONS  (STANDING):  enoxaparin Injectable 40 milliGRAM(s) SubCutaneous daily    MEDICATIONS  (PRN):  acetaminophen   Tablet .. 650 milliGRAM(s) Oral every 6 hours PRN Mild Pain (1 - 3), Moderate Pain (4 - 6)      FAMILY HISTORY:  No pertinent family history in first degree relatives      SOCIAL HISTORY:+ tobacco    REVIEW OF SYSTEMS:    CONSTITUTIONAL: +WEIGHT LOSS, No weakness, fevers or chills  EYES/ENT: No visual changes;  No vertigo or throat pain   NECK: No pain or stiffness  RESPIRATORY: No cough, wheezing, hemoptysis; No shortness of breath  CARDIOVASCULAR: No chest pain or palpitations  GASTROINTESTINAL: No abdominal or epigastric pain. No nausea, vomiting, or hematemesis; No diarrhea or constipation. No melena or hematochezia.  GENITOURINARY: No dysuria, frequency or hematuria  NEUROLOGICAL: No numbness or weakness  SKIN: No itching, burning, rashes, or lesions   All other review of systems is negative unless indicated above.      Height (cm): 180.34 (01-23 @ 19:04)  Weight (kg): 75.4 (01-23 @ 19:04)  BMI (kg/m2): 23.2 (01-23 @ 19:04)  BSA (m2): 1.95 (01-23 @ 19:04)    T(F): 98.3 (01-24-19 @ 06:04), Max: 98.3 (01-24-19 @ 06:04)  HR: 100 (01-24-19 @ 06:04)  BP: 111/59 (01-24-19 @ 06:04)  RR: 17 (01-24-19 @ 06:04)  SpO2: 96% (01-24-19 @ 06:04)  Wt(kg): --    GENERAL: NAD, well-developed  HEAD:  Atraumatic, Normocephalic  EYES: EOMI, PERRLA, conjunctiva and sclera clear  NECK: Supple, No JVD  CHEST/LUNG: Clear to auscultation bilaterally; No wheeze  HEART: Regular rate and rhythm; No murmurs, rubs, or gallops  ABDOMEN: Soft, Nontender, Nondistended; Bowel sounds present  EXTREMITIES:  2+ Peripheral Pulses, No clubbing, cyanosis, or edema  NEUROLOGY: non-focal  SKIN: No rashes or lesions                          7.0    9.88  )-----------( 472      ( 24 Jan 2019 06:00 )             23.9       01-24    136  |  101  |  15  ----------------------------<  84  4.3   |  22  |  0.94    Ca    10.3      24 Jan 2019 06:00  Phos  3.5     01-24  Mg     1.8     01-24    TPro  6.6  /  Alb  2.8<L>  /  TBili  0.7  /  DBili  x   /  AST  21  /  ALT  17  /  AlkPhos  308<H>  01-24      Phosphorus Level, Serum: 3.5 mg/dL (01-24 @ 06:00)  Magnesium, Serum: 1.8 mg/dL (01-24 @ 06:00)  Lactate Dehydrogenase, Serum: 129 U/L (01-24 @ 06:00)      PT/INR - ( 23 Jan 2019 10:51 )   PT: 15.8 SEC;   INR: 1.41          PTT - ( 23 Jan 2019 10:51 )  PTT:35.3 SEC      RADIOLOGY:  EXAM:  CT CHEST      PROCEDURE DATE:  Jan 23 2019     INTERPRETATION:  CLINICAL INFORMATION: Intermittent back pain for 2 months, tobacco use, weight loss.    COMPARISON: Chest radiograph 1/23/2019.    PROCEDURE:   CT of the Chest was performed without intravenous contrast.  Sagittal and coronal reformats were performed.    FINDINGS:    LUNGS AND LARGE AIRWAYS: Debris/secretions within the trachea/left main bronchus. Masslike/consolidative right upper lobe opacity with narrowing/occlusion of the anterior/posterior segmental bronchi of the right upper lobe, measuring approximately 6.9 x 7.3 cm on series 2, image 42. Many additional masses and nodular opacities throughout the lungs bilaterally. For reference, a left lower lobe mass measures 4.1 x 3.4 cm (2:87), and a right lower lobe nodule measures 1.5 cm (2:87).  PLEURA: No pleural effusion.  VESSELS: Mild atherosclerotic changes. Left three-vessel arch.  HEART: Heart size is normal. No pericardial effusion.  MEDIASTINUM AND CASEY: Mediastinal and bilateral hilar lymphadenopathy. For reference, a right paratracheal node measures 2.7 x 1.7 cm (2:28).  CHEST WALL AND LOWER NECK: Within normal limits.  VISUALIZED UPPER ABDOMEN: 2.0 cm right and 1.8 cm left adrenal metastases.  BONES: Mild degenerative changes.    IMPRESSION:   A large right upper lobe mass with additional left lower lobe mass, innumerable bilateral nodular opacities, and mediastinal/bilateral hilar lymphadenopathy.  Bilateral adrenal metastases.      EXAM:  CT ABDOMEN AND PELVIS IC      PROCEDURE DATE:  Jan 23 2019     INTERPRETATION:  CLINICAL INFORMATION: New lung mass, assess further metastases.         COMPARISON: CT scan of the chest from the same day.    PROCEDURE:   CT of the Abdomen and Pelvis was performed with intravenous contrast.   Intravenous contrast: 90 ml Omnipaque 350. 10 ml discarded.  Oral contrast: None.  Sagittal and coronal reformats were performed.    FINDINGS:    LOWER CHEST: Numerous lung nodules and lung masses are again noted. There is a necrotic appearing right paraesophageal lymph node measuring 1.5 x 1.3 cm.    LIVER: Within normal limits.  BILE DUCTS: Normal caliber.  GALLBLADDER: Contracted.   SPLEEN: Within normal limits.  PANCREAS: Within normal limits.  ADRENALS: Bilateral adrenal nodules measuring for example 2.0 cm on the right side.  KIDNEYS/URETERS: Necrotic appearing mass inseparable from the lower pole the left kidney measuring 10.7 x 11.1 x 8.4 cm (2, 62 and 602, 45).    BLADDER: Within normal limits.  REPRODUCTIVE ORGANS: The prostate gland and seminal vesicles appear unremarkable.    BOWEL: No bowel obstruction.   PERITONEUM: No ascites.  VESSELS:  Within normal limits.  RETROPERITONEUM: Left paracentral aortic lymphadenopathy with a lymph node pushing the left renal vein anteriorly measuring 2.5 x 2.3 cm (2, 45).  ABDOMINAL WALL: Small fat-containing umbilical hernia.  BONES: Calcification posteriorly at L4-5.    IMPRESSION:   Bilateral lung base nodules and masses again noted.  11.1 cm necrotic mass inseparable from the lower pole of the left kidney.  Bilateral adrenal nodules and left para-aortic lymphadenopathy.                      MEAGAN GROSS M.D., ATTENDING RADIOLOGIST  This document has beenelectronically signed. Jan 24 2019  9:13AM                  < end of copied text >                    GROVER PAREKH M.D., RADIOLOGY RESIDENT  This document has been electronically signed.  SYL BALDERRAMA M.D., ATTENDING RADIOLOGIST  This document has been electronically signed. Jan 23 2019  2:26PM HPI:  68M w/ PMH of GERD, smoking presenting w/ progressive back pain x4 months. Pt states that the back pain is dull, intermittent, and located inferior to his right scapula, and occasionally wraps around to the side of his chest. Pt states that the pain has gotten worse over the past few months, and it worsens with movement. It frequently awakens him from sleep, but improves with two Advil liquid capsules. Pt also endorses multiple months of an intermittent cough productive of yellow sputum. Pt went to see a doctor 3 months ago for evaluation of the cough at which time his weight was >20 lbs less than his baseline. He becomes sob with activity but not at rest. Pt states he also has a decreased appetite. He also has occasional subjective fevers/chills. He has also had a recent polyuria, waking every 3 hours to urinate at night. The patient quit his job as a  three months ago on account of his symptoms.  CXR showed possible RUL PNA and multiple opacities. CT chest: RUL and LLL lesion, numerous opacities, hilar lymphadenopathy, b/l adrenal mets.       Patient has never had a colonoscopy.  Greater than 40 pack year smoking history.  Is a retired .  No family locally, but a strong friend support system.  Daughter lives in [place].       PAST MEDICAL & SURGICAL HISTORY:  GERD (gastroesophageal reflux disease)  Appendicitis  History of appendectomy      Allergies  No Known Allergies        MEDICATIONS  (STANDING):  enoxaparin Injectable 40 milliGRAM(s) SubCutaneous daily    MEDICATIONS  (PRN):  acetaminophen   Tablet .. 650 milliGRAM(s) Oral every 6 hours PRN Mild Pain (1 - 3), Moderate Pain (4 - 6)      FAMILY HISTORY:  No pertinent family history in first degree relatives      SOCIAL HISTORY:+ tobacco    REVIEW OF SYSTEMS:    CONSTITUTIONAL: +WEIGHT LOSS, No weakness, fevers or chills  EYES/ENT: No visual changes;  No vertigo or throat pain   NECK: No pain or stiffness  RESPIRATORY: No cough, wheezing, hemoptysis; No shortness of breath  CARDIOVASCULAR: No chest pain or palpitations  GASTROINTESTINAL: No abdominal or epigastric pain. No nausea, vomiting, or hematemesis; No diarrhea or constipation. No melena or hematochezia.  GENITOURINARY: No dysuria, frequency or hematuria  NEUROLOGICAL: No numbness or weakness  SKIN: No itching, burning, rashes, or lesions   All other review of systems is negative unless indicated above.      Height (cm): 180.34 (01-23 @ 19:04)  Weight (kg): 75.4 (01-23 @ 19:04)  BMI (kg/m2): 23.2 (01-23 @ 19:04)  BSA (m2): 1.95 (01-23 @ 19:04)    T(F): 98.3 (01-24-19 @ 06:04), Max: 98.3 (01-24-19 @ 06:04)  HR: 100 (01-24-19 @ 06:04)  BP: 111/59 (01-24-19 @ 06:04)  RR: 17 (01-24-19 @ 06:04)  SpO2: 96% (01-24-19 @ 06:04)  Wt(kg): --    GENERAL: NAD, well-developed  HEAD:  Atraumatic, Normocephalic  EYES: EOMI, conjunctiva and sclera clear  NECK: Supple, No JVD  CHEST/LUNG: Clear to auscultation bilaterally; No wheezes or crackles   HEART: Regular rate and rhythm; No murmurs, rubs, or gallops  ABDOMEN: Soft, Nontender, Nondistended; Bowel sounds present  EXTREMITIES: No clubbing, cyanosis, or edema  NEUROLOGY: non-focal  SKIN: No rashes or lesions                          7.0    9.88  )-----------( 472      ( 24 Jan 2019 06:00 )             23.9       01-24    136  |  101  |  15  ----------------------------<  84  4.3   |  22  |  0.94    Ca    10.3      24 Jan 2019 06:00  Phos  3.5     01-24  Mg     1.8     01-24    TPro  6.6  /  Alb  2.8<L>  /  TBili  0.7  /  DBili  x   /  AST  21  /  ALT  17  /  AlkPhos  308<H>  01-24      Phosphorus Level, Serum: 3.5 mg/dL (01-24 @ 06:00)  Magnesium, Serum: 1.8 mg/dL (01-24 @ 06:00)  Lactate Dehydrogenase, Serum: 129 U/L (01-24 @ 06:00)      PT/INR - ( 23 Jan 2019 10:51 )   PT: 15.8 SEC;   INR: 1.41          PTT - ( 23 Jan 2019 10:51 )  PTT:35.3 SEC      RADIOLOGY:  EXAM:  CT CHEST      PROCEDURE DATE:  Jan 23 2019     INTERPRETATION:  CLINICAL INFORMATION: Intermittent back pain for 2 months, tobacco use, weight loss.    COMPARISON: Chest radiograph 1/23/2019.    PROCEDURE:   CT of the Chest was performed without intravenous contrast.  Sagittal and coronal reformats were performed.    FINDINGS:    LUNGS AND LARGE AIRWAYS: Debris/secretions within the trachea/left main bronchus. Masslike/consolidative right upper lobe opacity with narrowing/occlusion of the anterior/posterior segmental bronchi of the right upper lobe, measuring approximately 6.9 x 7.3 cm on series 2, image 42. Many additional masses and nodular opacities throughout the lungs bilaterally. For reference, a left lower lobe mass measures 4.1 x 3.4 cm (2:87), and a right lower lobe nodule measures 1.5 cm (2:87).  PLEURA: No pleural effusion.  VESSELS: Mild atherosclerotic changes. Left three-vessel arch.  HEART: Heart size is normal. No pericardial effusion.  MEDIASTINUM AND CASEY: Mediastinal and bilateral hilar lymphadenopathy. For reference, a right paratracheal node measures 2.7 x 1.7 cm (2:28).  CHEST WALL AND LOWER NECK: Within normal limits.  VISUALIZED UPPER ABDOMEN: 2.0 cm right and 1.8 cm left adrenal metastases.  BONES: Mild degenerative changes.    IMPRESSION:   A large right upper lobe mass with additional left lower lobe mass, innumerable bilateral nodular opacities, and mediastinal/bilateral hilar lymphadenopathy.  Bilateral adrenal metastases.      EXAM:  CT ABDOMEN AND PELVIS IC      PROCEDURE DATE:  Jan 23 2019     INTERPRETATION:  CLINICAL INFORMATION: New lung mass, assess further metastases.         COMPARISON: CT scan of the chest from the same day.    PROCEDURE:   CT of the Abdomen and Pelvis was performed with intravenous contrast.   Intravenous contrast: 90 ml Omnipaque 350. 10 ml discarded.  Oral contrast: None.  Sagittal and coronal reformats were performed.    FINDINGS:    LOWER CHEST: Numerous lung nodules and lung masses are again noted. There is a necrotic appearing right paraesophageal lymph node measuring 1.5 x 1.3 cm.    LIVER: Within normal limits.  BILE DUCTS: Normal caliber.  GALLBLADDER: Contracted.   SPLEEN: Within normal limits.  PANCREAS: Within normal limits.  ADRENALS: Bilateral adrenal nodules measuring for example 2.0 cm on the right side.  KIDNEYS/URETERS: Necrotic appearing mass inseparable from the lower pole the left kidney measuring 10.7 x 11.1 x 8.4 cm (2, 62 and 602, 45).    BLADDER: Within normal limits.  REPRODUCTIVE ORGANS: The prostate gland and seminal vesicles appear unremarkable.    BOWEL: No bowel obstruction.   PERITONEUM: No ascites.  VESSELS:  Within normal limits.  RETROPERITONEUM: Left paracentral aortic lymphadenopathy with a lymph node pushing the left renal vein anteriorly measuring 2.5 x 2.3 cm (2, 45).  ABDOMINAL WALL: Small fat-containing umbilical hernia.  BONES: Calcification posteriorly at L4-5.    IMPRESSION:   Bilateral lung base nodules and masses again noted.  11.1 cm necrotic mass inseparable from the lower pole of the left kidney.  Bilateral adrenal nodules and left para-aortic lymphadenopathy.                      MEAGAN GROSS M.D., ATTENDING RADIOLOGIST  This document has beenelectronically signed. Jan 24 2019  9:13AM                  < end of copied text >                    GROVER PAREKH M.D., RADIOLOGY RESIDENT  This document has been electronically signed.  SYL BALDERRAMA M.D., ATTENDING RADIOLOGIST  This document has been electronically signed. Jan 23 2019  2:26PM HPI:  68M w/ PMH of GERD, smoking presenting w/ progressive back pain x4 months. Pt states that the back pain is dull, intermittent, and located inferior to his right scapula, and occasionally wraps around to the side of his chest. Pt states that the pain has gotten worse over the past few months, and it worsens with movement. It frequently awakens him from sleep, but improves with two Advil liquid capsules. Pt also endorses multiple months of an intermittent cough productive of yellow sputum. Pt went to see a doctor 3 months ago for evaluation of the cough at which time his weight was >20 lbs less than his baseline. He becomes sob with activity but not at rest. Pt states he also has a decreased appetite. He also has occasional subjective fevers/chills. He has also had a recent polyuria, waking every 3 hours to urinate at night. The patient quit his job as a  three months ago on account of his symptoms.  CXR showed possible RUL PNA and multiple opacities. CT chest: RUL and LLL lesion, numerous opacities, hilar lymphadenopathy, b/l adrenal mets.       Patient has never had a colonoscopy.  Greater than 40 pack year smoking history.  Is a retired .  No family locally, but a strong friend support system.  Daughter lives in [place].       PAST MEDICAL & SURGICAL HISTORY:  GERD (gastroesophageal reflux disease)  Appendicitis  History of appendectomy      Allergies  No Known Allergies        MEDICATIONS  (STANDING):  enoxaparin Injectable 40 milliGRAM(s) SubCutaneous daily    MEDICATIONS  (PRN):  acetaminophen   Tablet .. 650 milliGRAM(s) Oral every 6 hours PRN Mild Pain (1 - 3), Moderate Pain (4 - 6)      FAMILY HISTORY:  No pertinent family history in first degree relatives      SOCIAL HISTORY:+ tobacco    REVIEW OF SYSTEMS:    CONSTITUTIONAL: +WEIGHT LOSS, No weakness, fevers or chills  EYES/ENT: No visual changes;  No vertigo or throat pain   NECK: No pain or stiffness  RESPIRATORY: No cough, wheezing, hemoptysis; No shortness of breath  CARDIOVASCULAR: No chest pain or palpitations  GASTROINTESTINAL: No abdominal or epigastric pain. No nausea, vomiting, or hematemesis; No diarrhea or constipation. No melena or hematochezia.  GENITOURINARY: No dysuria, frequency or hematuria  NEUROLOGICAL: No numbness or weakness  SKIN: No itching, burning, rashes, or lesions   All other review of systems is negative unless indicated above.      Height (cm): 180.34 (01-23 @ 19:04)  Weight (kg): 75.4 (01-23 @ 19:04)  BMI (kg/m2): 23.2 (01-23 @ 19:04)  BSA (m2): 1.95 (01-23 @ 19:04)    T(F): 98.3 (01-24-19 @ 06:04), Max: 98.3 (01-24-19 @ 06:04)  HR: 100 (01-24-19 @ 06:04)  BP: 111/59 (01-24-19 @ 06:04)  RR: 17 (01-24-19 @ 06:04)  SpO2: 96% (01-24-19 @ 06:04)  Wt(kg): --    GENERAL: NAD, well-developed  HEAD:  Atraumatic, Normocephalic  EYES: EOMI, conjunctiva and sclera clear  NECK: Supple, No JVD  CHEST/LUNG: Clear to auscultation bilaterally; No wheezes or crackles   HEART: Regular rate and rhythm; No murmurs, rubs, or gallops  ABDOMEN: Soft, Nontender, Nondistended; Bowel sounds present  EXTREMITIES: No clubbing, cyanosis, or edema  NEUROLOGY: non-focal  SKIN: No rashes or lesions  LYMPH/heme: No peripheral nodes                          7.0    9.88  )-----------( 472      ( 24 Jan 2019 06:00 )             23.9       01-24    136  |  101  |  15  ----------------------------<  84  4.3   |  22  |  0.94    Ca    10.3      24 Jan 2019 06:00  Phos  3.5     01-24  Mg     1.8     01-24    TPro  6.6  /  Alb  2.8<L>  /  TBili  0.7  /  DBili  x   /  AST  21  /  ALT  17  /  AlkPhos  308<H>  01-24      Phosphorus Level, Serum: 3.5 mg/dL (01-24 @ 06:00)  Magnesium, Serum: 1.8 mg/dL (01-24 @ 06:00)  Lactate Dehydrogenase, Serum: 129 U/L (01-24 @ 06:00)      PT/INR - ( 23 Jan 2019 10:51 )   PT: 15.8 SEC;   INR: 1.41          PTT - ( 23 Jan 2019 10:51 )  PTT:35.3 SEC      RADIOLOGY:  EXAM:  CT CHEST      PROCEDURE DATE:  Jan 23 2019     INTERPRETATION:  CLINICAL INFORMATION: Intermittent back pain for 2 months, tobacco use, weight loss.    COMPARISON: Chest radiograph 1/23/2019.    PROCEDURE:   CT of the Chest was performed without intravenous contrast.  Sagittal and coronal reformats were performed.    FINDINGS:    LUNGS AND LARGE AIRWAYS: Debris/secretions within the trachea/left main bronchus. Masslike/consolidative right upper lobe opacity with narrowing/occlusion of the anterior/posterior segmental bronchi of the right upper lobe, measuring approximately 6.9 x 7.3 cm on series 2, image 42. Many additional masses and nodular opacities throughout the lungs bilaterally. For reference, a left lower lobe mass measures 4.1 x 3.4 cm (2:87), and a right lower lobe nodule measures 1.5 cm (2:87).  PLEURA: No pleural effusion.  VESSELS: Mild atherosclerotic changes. Left three-vessel arch.  HEART: Heart size is normal. No pericardial effusion.  MEDIASTINUM AND CASEY: Mediastinal and bilateral hilar lymphadenopathy. For reference, a right paratracheal node measures 2.7 x 1.7 cm (2:28).  CHEST WALL AND LOWER NECK: Within normal limits.  VISUALIZED UPPER ABDOMEN: 2.0 cm right and 1.8 cm left adrenal metastases.  BONES: Mild degenerative changes.    IMPRESSION:   A large right upper lobe mass with additional left lower lobe mass, innumerable bilateral nodular opacities, and mediastinal/bilateral hilar lymphadenopathy.  Bilateral adrenal metastases.      EXAM:  CT ABDOMEN AND PELVIS IC      PROCEDURE DATE:  Jan 23 2019     INTERPRETATION:  CLINICAL INFORMATION: New lung mass, assess further metastases.         COMPARISON: CT scan of the chest from the same day.    PROCEDURE:   CT of the Abdomen and Pelvis was performed with intravenous contrast.   Intravenous contrast: 90 ml Omnipaque 350. 10 ml discarded.  Oral contrast: None.  Sagittal and coronal reformats were performed.    FINDINGS:    LOWER CHEST: Numerous lung nodules and lung masses are again noted. There is a necrotic appearing right paraesophageal lymph node measuring 1.5 x 1.3 cm.    LIVER: Within normal limits.  BILE DUCTS: Normal caliber.  GALLBLADDER: Contracted.   SPLEEN: Within normal limits.  PANCREAS: Within normal limits.  ADRENALS: Bilateral adrenal nodules measuring for example 2.0 cm on the right side.  KIDNEYS/URETERS: Necrotic appearing mass inseparable from the lower pole the left kidney measuring 10.7 x 11.1 x 8.4 cm (2, 62 and 602, 45).    BLADDER: Within normal limits.  REPRODUCTIVE ORGANS: The prostate gland and seminal vesicles appear unremarkable.    BOWEL: No bowel obstruction.   PERITONEUM: No ascites.  VESSELS:  Within normal limits.  RETROPERITONEUM: Left paracentral aortic lymphadenopathy with a lymph node pushing the left renal vein anteriorly measuring 2.5 x 2.3 cm (2, 45).  ABDOMINAL WALL: Small fat-containing umbilical hernia.  BONES: Calcification posteriorly at L4-5.    IMPRESSION:   Bilateral lung base nodules and masses again noted.  11.1 cm necrotic mass inseparable from the lower pole of the left kidney.  Bilateral adrenal nodules and left para-aortic lymphadenopathy.                      MEAGAN GROSS M.D., ATTENDING RADIOLOGIST  This document has beenelectronically signed. Jan 24 2019  9:13AM                  < end of copied text >                    GROVER PAREKH M.D., RADIOLOGY RESIDENT  This document has been electronically signed.  SYL BALDERRAMA M.D., ATTENDING RADIOLOGIST  This document has been electronically signed. Jan 23 2019  2:26PM

## 2019-01-24 NOTE — CONSULT NOTE ADULT - ASSESSMENT
68M presenting with back pain, found to have large RUL mass, LLL mass, bilateral nodular opacities, mediastinal/hilar lymphadenopathy, bilateral adrenal nodules and a large left renal necrotic mass concerning for malignancy.     # Pulmonary nodules with mediastinal/hilar lymphadenopathy: also with adrenal nodules  - concerning for primary lung cancer with metastases to the adrenals 68M presenting with back pain, found to have large RUL mass, LLL mass, bilateral nodular opacities, mediastinal/hilar lymphadenopathy, bilateral adrenal nodules and a large left renal necrotic mass concerning for malignancy.     # Pulmonary nodules with mediastinal/hilar lymphadenopathy: also with adrenal nodules  - concerning for primary lung cancer with metastases to other lung as well as adrenals   - needs biopsy of lung mass  - needs MRI brain to evaluate for any brain metastases    # Renal mass:  - recommend Urology consult    Olivia Powell MD  Hematology/Oncology Fellow, PGY-4  pager: [phone]  After 5pm or on weekends, please page the on-call fellow. 68M presenting with back pain, found to have large RUL mass, LLL mass, bilateral nodular opacities, mediastinal/hilar lymphadenopathy, bilateral adrenal nodules and a large left renal necrotic mass concerning for malignancy.     # Pulmonary nodules with mediastinal/hilar lymphadenopathy: also with adrenal nodules  - concerning for primary lung cancer with metastases to other lung as well as adrenals   - recommend biopsy of lung mass  - needs MRI brain to evaluate for any brain metastases    # Renal mass: large necrotic mass, lung lesions could also be possible metastatic renal to the lung  - recommend Urology consult for possible resection/management    # Anemia, microcytic: possibly iron deficiency vs anemia of chronic disease, also with very large renal mass   - low serum iron (16), high ferritin could be an acute phase reactant due to underlying malignancy  - can do oral iron supplementation       Olivia Powell MD  Hematology/Oncology Fellow, PGY-4  pager: [phone]  After 5pm or on weekends, please page the on-call fellow. 68M presenting with back pain, found to have large RUL mass, LLL mass, bilateral nodular opacities, mediastinal/hilar lymphadenopathy, bilateral adrenal nodules and a large left renal necrotic mass concerning for malignancy.     # Pulmonary nodules with mediastinal/hilar lymphadenopathy: also with adrenal nodules  - concerning for primary lung cancer with metastases to other lung as well as adrenals   - recommend biopsy of lung mass  - needs MRI brain to evaluate for any brain metastases    # Renal mass: large necrotic mass, lung lesions could also be possible metastatic renal to the lung  - recommend Urology consult for possible resection/management    # Anemia, microcytic: possibly iron deficiency vs anemia of chronic disease, also with very large renal mass   - low serum iron (16), high ferritin could be an acute phase reactant due to underlying malignancy  - can do oral iron supplementation   - transfuse PRN for hgb<7      Olivia Powell MD  Hematology/Oncology Fellow, PGY-4  pager: [phone]  After 5pm or on weekends, please page the on-call fellow.

## 2019-01-24 NOTE — PROGRESS NOTE ADULT - SUBJECTIVE AND OBJECTIVE BOX
Hanane Jones MD PGY1     Pager [phone]/ [phone]    For Night coverage 7pm-7am: NS- page 1443 Team1-3, page 1446 Team4 & Care Model  Sat/[place] Cross Coverage 12pm-7pm: NS- page 1443 for Team1-4, [facility]- pager forwarded to covering Resident      ISIDORO SHOOK  68y  Male    Subjective: No events overnight.    T(C): 36.8 (19 @ 06:04), Max: 36.8 (19 @ 12:41)  HR: 100 (19 @ 06:04) (100 - 119)  BP: 111/59 (19 @ 06:04) (111/59 - 154/67)  RR: 17 (19 @ 06:04) (16 - 18)  SpO2: 96% (19 @ 06:04) (96% - 100%)  Wt(kg): --Vital Signs Last 24 Hrs  T(C): 36.8 (2019 06:04), Max: 36.8 (2019 12:41)  T(F): 98.3 (2019 06:04), Max: 98.3 (2019 06:04)  HR: 100 (2019 06:04) (100 - 119)  .  LABS:                         7.0    9.88  )-----------( 472      ( 2019 06:00 )             23.9         136  |  101  |  15  ----------------------------<  84  4.3   |  22  |  0.94    Ca    10.3      2019 06:00  Phos  3.5       Mg     1.8         TPro  6.6  /  Alb  2.8<L>  /  TBili  0.7  /  DBili  x   /  AST  21  /  ALT  17  /  AlkPhos  308<H>      PT/INR - ( 2019 10:51 )   PT: 15.8 SEC;   INR: 1.41          PTT - ( 2019 10:51 )  PTT:35.3 SEC  Urinalysis Basic - ( 2019 10:55 )    Color: YELLOW / Appearance: Lt TURBID / S.023 / pH: 6.5  Gluc: NEGATIVE / Ketone: NEGATIVE  / Bili: NEGATIVE / Urobili: TRACE   Blood: TRACE / Protein: 100 / Nitrite: NEGATIVE   Leuk Esterase: NEGATIVE / RBC: 3-5 / WBC 6-10   Sq Epi: FEW / Non Sq Epi: x / Bacteria: FEW            RADIOLOGY, EKG & ADDITIONAL TESTS: Reviewed. BP: 111/59 (2019 06:04) (111/59 - 154/67)  BP(mean): --  RR: 17 (2019 06:04) (16 - 18)  SpO2: 96% (2019 06:04) (96% - 100%)    PHYSICAL EXAM:  GENERAL: NAD, well-groomed, well-developed  HEAD:  Atraumatic, Normocephalic  EYES: EOMI, PERRLA, conjunctiva and sclera clear  ENMT: No tonsillar erythema, exudates, or enlargement; Moist mucous membranes, Good dentition, No lesions  NECK: Supple, No JVD, Normal thyroid  NERVOUS SYSTEM:  Alert & Oriented X3, Good concentration; Motor Strength 5/5 B/L upper and lower extremities; DTRs 2+ intact and symmetric  CHEST/LUNG: Clear to percussion bilaterally; No rales, rhonchi, wheezing, or rubs  HEART: Regular rate and rhythm; No murmurs, rubs, or gallops  ABDOMEN: Soft, Nontender, Nondistended; Bowel sounds present  EXTREMITIES:  2+ Peripheral Pulses, No clubbing, cyanosis, or edema  LYMPH: No lymphadenopathy noted  SKIN: No rashes or lesions    Consultant(s) Notes Reviewed:  [x ] YES  [ ] NO  Care Discussed with Consultants/Other Providers [ x] YES  [ ] NO    LABS:        RADIOLOGY & ADDITIONAL TESTS:    Imaging Personally Reviewed:  [ ] YES  [ ] NO  MedsMEDICATIONS  (STANDING):  enoxaparin Injectable 40 milliGRAM(s) SubCutaneous daily    MEDICATIONS  (PRN):  acetaminophen   Tablet .. 650 milliGRAM(s) Oral every 6 hours PRN Mild Pain (1 - 3), Moderate Pain (4 - 6) Hanane Jones MD PGY1     Pager [phone]/ [phone]    For Night coverage 7pm-7am: NS- page 1443 Team1-3, page 1446 Team4 & Care Model  Sat/[place] Cross Coverage 12pm-7pm: NS- page 1443 for Team1-4, [facility]- pager forwarded to covering Resident      PERIFINNISIDORO MOCTEZUMA  68y  Male    Subjective: No events overnight. Pt denies cp, sob, f/c, n/v. He states his cough today is better from yesterday. Back pain well-controlled.    T(C): 36.8 (19 @ 06:04), Max: 36.8 (19 @ 12:41)  HR: 100 (19 @ 06:04) (100 - 119)  BP: 111/59 (19 @ 06:04) (111/59 - 154/67)  RR: 17 (19 @ 06:04) (16 - 18)  SpO2: 96% (19 @ 06:04) (96% - 100%)  Wt(kg): --Vital Signs Last 24 Hrs  T(C): 36.8 (2019 06:04), Max: 36.8 (2019 12:41)  T(F): 98.3 (2019 06:04), Max: 98.3 (2019 06:04)  HR: 100 (2019 06:04) (100 - 119)  .  LABS:                         7.0    9.88  )-----------( 472      ( 2019 06:00 )             23.9         136  |  101  |  15  ----------------------------<  84  4.3   |  22  |  0.94    Ca    10.3      2019 06:00  Phos  3.5       Mg     1.8         TPro  6.6  /  Alb  2.8<L>  /  TBili  0.7  /  DBili  x   /  AST  21  /  ALT  17  /  AlkPhos  308<H>      PT/INR - ( 2019 10:51 )   PT: 15.8 SEC;   INR: 1.41          PTT - ( 2019 10:51 )  PTT:35.3 SEC  Urinalysis Basic - ( 2019 10:55 )    Color: YELLOW / Appearance: Lt TURBID / S.023 / pH: 6.5  Gluc: NEGATIVE / Ketone: NEGATIVE  / Bili: NEGATIVE / Urobili: TRACE   Blood: TRACE / Protein: 100 / Nitrite: NEGATIVE   Leuk Esterase: NEGATIVE / RBC: 3-5 / WBC 6-10   Sq Epi: FEW / Non Sq Epi: x / Bacteria: FEW            RADIOLOGY, EKG & ADDITIONAL TESTS: Reviewed. BP: 111/59 (2019 06:04) (111/59 - 154/67)  BP(mean): --  RR: 17 (2019 06:04) (16 - 18)  SpO2: 96% (2019 06:04) (96% - 100%)    PHYSICAL EXAM:  GENERAL: NAD, well-groomed, well-developed  HEAD:  Atraumatic, Normocephalic  EYES: EOMI, PERRLA, conjunctiva and sclera clear  ENMT: No tonsillar erythema, exudates, or enlargement; Moist mucous membranes, Good dentition, No lesions  NECK: Supple, No JVD, Normal thyroid  NERVOUS SYSTEM:  Alert & Oriented X3, Good concentration; Motor Strength 5/5 B/L upper and lower extremities; DTRs 2+ intact and symmetric  CHEST/LUNG: Clear to percussion bilaterally; No rales, rhonchi, wheezing, or rubs  HEART: Regular rate and rhythm; No murmurs, rubs, or gallops  ABDOMEN: Soft, Nontender, Nondistended; Bowel sounds present  EXTREMITIES:  2+ Peripheral Pulses, No clubbing, cyanosis, or edema  LYMPH: No lymphadenopathy noted  SKIN: No rashes or lesions    Consultant(s) Notes Reviewed:  [x ] YES  [ ] NO  Care Discussed with Consultants/Other Providers [ x] YES  [ ] NO    LABS:                        7.0    9.88  )-----------( 472      ( 2019 06:00 )             23.9     01-24    136  |  101  |  15  ----------------------------<  84  4.3   |  22  |  0.94    Ca    10.3      2019 06:00  Phos  3.5     01-24  Mg     1.8         TPro  6.6  /  Alb  2.8<L>  /  TBili  0.7  /  DBili  x   /  AST  21  /  ALT  17  /  AlkPhos  308<H>      PT/INR - ( 2019 10:51 )   PT: 15.8 SEC;   INR: 1.41          PTT - ( 2019 10:51 )  PTT:35.3 SEC  Urinalysis Basic - ( 2019 10:55 )    Color: YELLOW / Appearance: Lt TURBID / S.023 / pH: 6.5  Gluc: NEGATIVE / Ketone: NEGATIVE  / Bili: NEGATIVE / Urobili: TRACE   Blood: TRACE / Protein: 100 / Nitrite: NEGATIVE   Leuk Esterase: NEGATIVE / RBC: 3-5 / WBC 6-10   Sq Epi: FEW / Non Sq Epi: x / Bacteria: FEW            RADIOLOGY, EKG & ADDITIONAL TESTS: Reviewed.       RADIOLOGY & ADDITIONAL TESTS:    Imaging Personally Reviewed:  [ ] YES  [ ] NO  MedsMEDICATIONS  (STANDING):  enoxaparin Injectable 40 milliGRAM(s) SubCutaneous daily    MEDICATIONS  (PRN):  acetaminophen   Tablet .. 650 milliGRAM(s) Oral every 6 hours PRN Mild Pain (1 - 3), Moderate Pain (4 - 6) Hanane Jones MD PGY1     Pager [phone]/ [phone]    For Night coverage 7pm-7am: NS- page 1443 Team1-3, page 1446 Team4 & Care Model  Sat/[place] Cross Coverage 12pm-7pm: NS- page 1443 for Team1-4, [facility]- pager forwarded to covering Resident      PERIFINNISIDORO MOCTEZUMA  68y  Male    Subjective: No events overnight. Pt denies cp, sob, f/c, n/v. He states his cough today is better from yesterday. Back pain well-controlled.    T(C): 36.8 (19 @ 06:04), Max: 36.8 (19 @ 12:41)  HR: 100 (19 @ 06:04) (100 - 119)  BP: 111/59 (19 @ 06:04) (111/59 - 154/67)  RR: 17 (19 @ 06:04) (16 - 18)  SpO2: 96% (19 @ 06:04) (96% - 100%)  Wt(kg): --Vital Signs Last 24 Hrs  T(C): 36.8 (2019 06:04), Max: 36.8 (2019 12:41)  T(F): 98.3 (2019 06:04), Max: 98.3 (2019 06:04)  HR: 100 (2019 06:04) (100 - 119)  .  LABS:                         7.0    9.88  )-----------( 472      ( 2019 06:00 )             23.9         136  |  101  |  15  ----------------------------<  84  4.3   |  22  |  0.94    Ca    10.3      2019 06:00  Phos  3.5       Mg     1.8         TPro  6.6  /  Alb  2.8<L>  /  TBili  0.7  /  DBili  x   /  AST  21  /  ALT  17  /  AlkPhos  308<H>      PT/INR - ( 2019 10:51 )   PT: 15.8 SEC;   INR: 1.41          PTT - ( 2019 10:51 )  PTT:35.3 SEC  Urinalysis Basic - ( 2019 10:55 )    Color: YELLOW / Appearance: Lt TURBID / S.023 / pH: 6.5  Gluc: NEGATIVE / Ketone: NEGATIVE  / Bili: NEGATIVE / Urobili: TRACE   Blood: TRACE / Protein: 100 / Nitrite: NEGATIVE   Leuk Esterase: NEGATIVE / RBC: 3-5 / WBC 6-10   Sq Epi: FEW / Non Sq Epi: x / Bacteria: FEW            RADIOLOGY, EKG & ADDITIONAL TESTS: Reviewed. BP: 111/59 (2019 06:04) (111/59 - 154/67)  BP(mean): --  RR: 17 (2019 06:04) (16 - 18)  SpO2: 96% (2019 06:04) (96% - 100%)    PHYSICAL EXAM:  GENERAL: NAD, well-groomed, well-developed  HEAD:  Atraumatic, Normocephalic  EYES: EOMI, PERRLA, conjunctiva and sclera clear  ENMT: No tonsillar erythema, exudates, or enlargement; Moist mucous membranes, Good dentition, No lesions  NECK: Supple, No JVD, Normal thyroid  NERVOUS SYSTEM:  Alert & Oriented X3, Good concentration; Motor Strength 5/5 B/L upper and lower extremities   CHEST/LUNG: Clear to percussion bilaterally; No rales, rhonchi, wheezing, or rubs  HEART: Regular rate and rhythm; No murmurs, rubs, or gallops  ABDOMEN: Soft, Nontender, Nondistended; Bowel sounds present  EXTREMITIES:  2+ Peripheral Pulses, No clubbing, cyanosis, or edema  LYMPH: No lymphadenopathy noted  SKIN: No rashes or lesions    Consultant(s) Notes Reviewed:  [x ] YES  [ ] NO  Care Discussed with Consultants/Other Providers [ x] YES  [ ] NO    LABS:                        7.0    9.88  )-----------( 472      ( 2019 06:00 )             23.9     01-24    136  |  101  |  15  ----------------------------<  84  4.3   |  22  |  0.94    Ca    10.3      2019 06:00  Phos  3.5     -24  Mg     1.8     01-24    TPro  6.6  /  Alb  2.8<L>  /  TBili  0.7  /  DBili  x   /  AST  21  /  ALT  17  /  AlkPhos  308<H>      PT/INR - ( 2019 10:51 )   PT: 15.8 SEC;   INR: 1.41          PTT - ( 2019 10:51 )  PTT:35.3 SEC  Urinalysis Basic - ( 2019 10:55 )    Color: YELLOW / Appearance: Lt TURBID / S.023 / pH: 6.5  Gluc: NEGATIVE / Ketone: NEGATIVE  / Bili: NEGATIVE / Urobili: TRACE   Blood: TRACE / Protein: 100 / Nitrite: NEGATIVE   Leuk Esterase: NEGATIVE / RBC: 3-5 / WBC 6-10   Sq Epi: FEW / Non Sq Epi: x / Bacteria: FEW            RADIOLOGY, EKG & ADDITIONAL TESTS: Reviewed.       RADIOLOGY & ADDITIONAL TESTS:    Imaging Personally Reviewed:  [ ] YES  [ ] NO  MedsMEDICATIONS  (STANDING):  enoxaparin Injectable 40 milliGRAM(s) SubCutaneous daily    MEDICATIONS  (PRN):  acetaminophen   Tablet .. 650 milliGRAM(s) Oral every 6 hours PRN Mild Pain (1 - 3), Moderate Pain (4 - 6)

## 2019-01-24 NOTE — CONSULT NOTE ADULT - ASSESSMENT
Assessment & Plan: 69 yo male with a significant 45 pack year smoking history and PMH of GERD and appendicitis with a significant left-sided renal mass found on CT.   - MRI of Brain to further evaluate for metastasis.  - Biopsy of Lung Nodule to determine primary source of CA.  - Follow Up Outpatient at [facility] to discuss further treatment of renal mass.       Katie Barry MD  Urology Resident  Pager #[phone] Assessment & Plan: 68M w/ b/l lung, b/l adrenal masses, and large L renal mass.  Hx remarkable for extensive smoking history and exposure risk.  Unclear at this time whether lung masses are primary lung ca vs metastatic renal ca.  Renal mass is RCC until proven otherwise.        - F/u MRI of brain to further evaluate for metastasis  - Agree with bx of lung nodule to determine primary lung ca vs metastatic renal  - No indication to bx renal mass  - Trend CBC, transfuse prn  - F/u heme-onc      Katie Barry MD  Urology Resident  Pager #[phone]

## 2019-01-24 NOTE — CONSULT NOTE ADULT - ATTENDING COMMENTS
patient seen and reviewed    plan for metastatic workup and oncology referral
68/m, smoker, a/w significant wt loss. Imaging showed a RUL mass, b/l pulm nodules, mediastinal/hilar nodes, adrenal mets. Concern for primary lung malignancy. Rec lung bx by IR or pulm. Brain MRI. Large renal lesion could be primary RCC or metastatic site. He doesn't have any sx from the renal mass. Rec urology consult. Will f/u.

## 2019-01-24 NOTE — PROGRESS NOTE ADULT - PROBLEM SELECTOR PLAN 1
Pt w/ new productive cough in setting of extensive smoking hx.   - CT chest shows large RUL mass w/ left lower lobe mass, innumerable bilateral nodular opacities, and mediastinal/bilateral hilar lymphadenopathy. Also w/ b/l adrenal metastases.   - CT A/P showing 11.1 cm necrotic mass inseparable from the lower pole of the left kidney, b/l adrenal nodules and left para-aortic lymphadenopathy.  - pulm recommends IR biopsy  - IR consulted, will f/u re recs   - H/O consulted, will f/u recs re which lesion best to biopsy for staging  - MTI head ordered to evaluate for brain mets

## 2019-01-24 NOTE — PROGRESS NOTE ADULT - PROBLEM SELECTOR PLAN 2
Ca 11.5 in setting of possible lung malignancy.  - asymptomatic, does not currently require intervention  - will monitor  - PTH decreased, will f/u PTHrP

## 2019-01-25 DIAGNOSIS — N28.89 OTHER SPECIFIED DISORDERS OF KIDNEY AND URETER: ICD-10-CM

## 2019-01-25 DIAGNOSIS — C79.31 SECONDARY MALIGNANT NEOPLASM OF BRAIN: ICD-10-CM

## 2019-01-25 LAB
ALBUMIN SERPL ELPH-MCNC: 2.8 G/DL — LOW (ref 3.3–5)
ALP SERPL-CCNC: 281 U/L — HIGH (ref 40–120)
ALT FLD-CCNC: 16 U/L — SIGNIFICANT CHANGE UP (ref 4–41)
ANION GAP SERPL CALC-SCNC: 12 MMO/L — SIGNIFICANT CHANGE UP (ref 7–14)
AST SERPL-CCNC: 17 U/L — SIGNIFICANT CHANGE UP (ref 4–40)
BILIRUB SERPL-MCNC: 0.7 MG/DL — SIGNIFICANT CHANGE UP (ref 0.2–1.2)
BUN SERPL-MCNC: 13 MG/DL — SIGNIFICANT CHANGE UP (ref 7–23)
CALCIUM SERPL-MCNC: 10.5 MG/DL — SIGNIFICANT CHANGE UP (ref 8.4–10.5)
CHLORIDE SERPL-SCNC: 100 MMOL/L — SIGNIFICANT CHANGE UP (ref 98–107)
CO2 SERPL-SCNC: 21 MMOL/L — LOW (ref 22–31)
CREAT SERPL-MCNC: 0.82 MG/DL — SIGNIFICANT CHANGE UP (ref 0.5–1.3)
GLUCOSE SERPL-MCNC: 96 MG/DL — SIGNIFICANT CHANGE UP (ref 70–99)
HCT VFR BLD CALC: 23.5 % — LOW (ref 39–50)
HGB BLD-MCNC: 6.7 G/DL — CRITICAL LOW (ref 13–17)
MAGNESIUM SERPL-MCNC: 1.9 MG/DL — SIGNIFICANT CHANGE UP (ref 1.6–2.6)
MCHC RBC-ENTMCNC: 21.1 PG — LOW (ref 27–34)
MCHC RBC-ENTMCNC: 28.5 % — LOW (ref 32–36)
MCV RBC AUTO: 73.9 FL — LOW (ref 80–100)
NRBC # FLD: 0 K/UL — LOW (ref 25–125)
PHOSPHATE SERPL-MCNC: 3.3 MG/DL — SIGNIFICANT CHANGE UP (ref 2.5–4.5)
PLATELET # BLD AUTO: 469 K/UL — HIGH (ref 150–400)
PMV BLD: 8.9 FL — SIGNIFICANT CHANGE UP (ref 7–13)
POTASSIUM SERPL-MCNC: 3.8 MMOL/L — SIGNIFICANT CHANGE UP (ref 3.5–5.3)
POTASSIUM SERPL-SCNC: 3.8 MMOL/L — SIGNIFICANT CHANGE UP (ref 3.5–5.3)
PROT SERPL-MCNC: 7.5 G/DL — SIGNIFICANT CHANGE UP (ref 6–8.3)
RBC # BLD: 3.18 M/UL — LOW (ref 4.2–5.8)
RBC # FLD: 18 % — HIGH (ref 10.3–14.5)
RH IG SCN BLD-IMP: POSITIVE — SIGNIFICANT CHANGE UP
SODIUM SERPL-SCNC: 133 MMOL/L — LOW (ref 135–145)
WBC # BLD: 8.55 K/UL — SIGNIFICANT CHANGE UP (ref 3.8–10.5)
WBC # FLD AUTO: 8.55 K/UL — SIGNIFICANT CHANGE UP (ref 3.8–10.5)

## 2019-01-25 PROCEDURE — 99232 SBSQ HOSP IP/OBS MODERATE 35: CPT | Mod: GC

## 2019-01-25 PROCEDURE — 99233 SBSQ HOSP IP/OBS HIGH 50: CPT

## 2019-01-25 PROCEDURE — 99233 SBSQ HOSP IP/OBS HIGH 50: CPT | Mod: GC

## 2019-01-25 RX ORDER — ALBUTEROL 90 UG/1
2.5 AEROSOL, METERED ORAL EVERY 6 HOURS
Qty: 0 | Refills: 0 | Status: DISCONTINUED | OUTPATIENT
Start: 2019-01-25 | End: 2019-01-25

## 2019-01-25 RX ORDER — DEXAMETHASONE 0.5 MG/5ML
4 ELIXIR ORAL EVERY 6 HOURS
Qty: 0 | Refills: 0 | Status: DISCONTINUED | OUTPATIENT
Start: 2019-01-25 | End: 2019-01-28

## 2019-01-25 RX ORDER — IPRATROPIUM/ALBUTEROL SULFATE 18-103MCG
3 AEROSOL WITH ADAPTER (GRAM) INHALATION EVERY 6 HOURS
Qty: 0 | Refills: 0 | Status: DISCONTINUED | OUTPATIENT
Start: 2019-01-25 | End: 2019-01-28

## 2019-01-25 RX ORDER — PANTOPRAZOLE SODIUM 20 MG/1
40 TABLET, DELAYED RELEASE ORAL
Qty: 0 | Refills: 0 | Status: DISCONTINUED | OUTPATIENT
Start: 2019-01-25 | End: 2019-01-28

## 2019-01-25 RX ORDER — DEXAMETHASONE 0.5 MG/5ML
6 ELIXIR ORAL EVERY 6 HOURS
Qty: 0 | Refills: 0 | Status: DISCONTINUED | OUTPATIENT
Start: 2019-01-25 | End: 2019-01-25

## 2019-01-25 RX ADMIN — Medication 3 MILLILITER(S): at 16:15

## 2019-01-25 RX ADMIN — Medication 4 MILLIGRAM(S): at 18:51

## 2019-01-25 RX ADMIN — Medication 3 MILLILITER(S): at 23:04

## 2019-01-25 RX ADMIN — PANTOPRAZOLE SODIUM 40 MILLIGRAM(S): 20 TABLET, DELAYED RELEASE ORAL at 18:53

## 2019-01-25 RX ADMIN — Medication 4 MILLIGRAM(S): at 22:00

## 2019-01-25 NOTE — PROGRESS NOTE ADULT - PROBLEM SELECTOR PLAN 3
Pt w/ chronic dull infrascapular pain in setting of likely malignancy concerning for mets.  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  - pt started on decadron  - NS c/s  - will c/s rad onc on monday

## 2019-01-25 NOTE — CHART NOTE - NSCHARTNOTEFT_GEN_A_CORE
IR Pre-Procedure Note    Patient Age:   68y    Patient Gender:   Male    Procedure (including site / side if known):    Diagnosis / Indication: Patient is a 68y old  Male who presents with a chief complaint of cough and back pain x 4months.    Interventional Radiology Attending Physician: Dr. De Luna    Ordering Attending Physician: Dr. Mejia    PAST MEDICAL & SURGICAL HISTORY:  GERD (gastroesophageal reflux disease)  Appendicitis  History of appendectomy       Pertinent Labs:   CBC Full  -  ( 25 Jan 2019 05:39 )  WBC Count : 8.55 K/uL  Hemoglobin : 6.7 g/dL  Hematocrit : 23.5 %  Platelet Count - Automated : 469 K/uL  Mean Cell Volume : 73.9 fL  Mean Cell Hemoglobin : 21.1 pg  Mean Cell Hemoglobin Concentration : 28.5 %  Auto Neutrophil # : x  Auto Lymphocyte # : x  Auto Monocyte # : x  Auto Eosinophil # : x  Auto Basophil # : x  Auto Neutrophil % : x  Auto Lymphocyte % : x  Auto Monocyte % : x  Auto Eosinophil % : x  Auto Basophil % : x    01-25    133<L>  |  100  |  13  ----------------------------<  96  3.8   |  21<L>  |  0.82    Ca    10.5      25 Jan 2019 05:39  Phos  3.3     01-25  Mg     1.9     01-25    TPro  7.5  /  Alb  2.8<L>  /  TBili  0.7  /  DBili  x   /  AST  17  /  ALT  16  /  AlkPhos  281<H>  01-25        Patient / Family aware of procedure:   [ x ] Y   [  ] N

## 2019-01-25 NOTE — PROGRESS NOTE ADULT - PROBLEM SELECTOR PLAN 5
Improve 2, Lovenox  Diet: regular Pt w/ chronic dull infrascapular pain in setting of likely malignancy concerning for mets.  - GGT elevated  - f/u w/u of lung lesions  - pain control

## 2019-01-25 NOTE — PROGRESS NOTE ADULT - SUBJECTIVE AND OBJECTIVE BOX
Hanane Jones MD PGY1     Pager [phone]/ [phone]    For Night coverage 7pm-7am: NS- page [phone] Team1-3, page [phone] Team4 & Care Model  Sat/[place] Cross Coverage 12pm-7pm: NS- page [phone] for Team1-4, [facility]- pager forwarded to covering Resident      BORISCAMERONISIDORO MOCTEZUMA  68y  Male    Subjective:           T(C): 37.1 (19 @ 05:34), Max: 37.6 (19 @ 21:19)  HR: 92 (19 @ 05:34) (92 - 98)  BP: 132/64 (19 @ 05:34) (120/65 - 132/64)  RR: 16 (19 @ 05:34) (16 - 18)  SpO2: 98% (19 @ 05:34) (97% - 98%)  Wt(kg): --Vital Signs Last 24 Hrs  T(C): 37.1 (2019 05:34), Max: 37.6 (2019 21:19)  T(F): 98.7 (2019 05:34), Max: 99.6 (2019 21:19)  HR: 92 (2019 05:34) (92 - 98)  BP: 132/64 (2019 05:34) (120/65 - 132/64)  BP(mean): --  RR: 16 (2019 05:34) (16 - 18)  SpO2: 98% (2019 05:34) (97% - 98%)    PHYSICAL EXAM:  GENERAL: NAD, well-groomed, well-developed  HEAD:  Atraumatic, Normocephalic  EYES: EOMI, PERRLA, conjunctiva and sclera clear  ENMT: No tonsillar erythema, exudates, or enlargement; Moist mucous membranes, Good dentition, No lesions  NECK: Supple, No JVD, Normal thyroid  NERVOUS SYSTEM:  Alert & Oriented X3, Good concentration; Motor Strength 5/5 B/L upper and lower extremities; DTRs 2+ intact and symmetric  CHEST/LUNG: Clear to percussion bilaterally; No rales, rhonchi, wheezing, or rubs  HEART: Regular rate and rhythm; No murmurs, rubs, or gallops  ABDOMEN: Soft, Nontender, Nondistended; Bowel sounds present  EXTREMITIES:  2+ Peripheral Pulses, No clubbing, cyanosis, or edema  LYMPH: No lymphadenopathy noted  SKIN: No rashes or lesions    Consultant(s) Notes Reviewed:  [x ] YES  [ ] NO  Care Discussed with Consultants/Other Providers [ x] YES  [ ] NO    LABS:                        6.7    8.55  )-----------( 469      ( 2019 05:39 )             23.5         133<L>  |  100  |  13  ----------------------------<  96  3.8   |  21<L>  |  0.82    Ca    10.5      2019 05:39  Phos  3.3       Mg     1.9         TPro  7.5  /  Alb  2.8<L>  /  TBili  0.7  /  DBili  x   /  AST  17  /  ALT  16  /  AlkPhos  281<H>      PT/INR - ( 2019 10:51 )   PT: 15.8 SEC;   INR: 1.41          PTT - ( 2019 10:51 )  PTT:35.3 SEC  Urinalysis Basic - ( 2019 10:55 )    Color: YELLOW / Appearance: Lt TURBID / S.023 / pH: 6.5  Gluc: NEGATIVE / Ketone: NEGATIVE  / Bili: NEGATIVE / Urobili: TRACE   Blood: TRACE / Protein: 100 / Nitrite: NEGATIVE   Leuk Esterase: NEGATIVE / RBC: 3-5 / WBC 6-10   Sq Epi: FEW / Non Sq Epi: x / Bacteria: FEW            RADIOLOGY, EKG & ADDITIONAL TESTS: Reviewed.         RADIOLOGY & ADDITIONAL TESTS:    Imaging Personally Reviewed:  [ ] YES  [ ] NO  MedsMEDICATIONS  (STANDING):  enoxaparin Injectable 40 milliGRAM(s) SubCutaneous daily  pantoprazole    Tablet 40 milliGRAM(s) Oral before breakfast    MEDICATIONS  (PRN):  acetaminophen   Tablet .. 650 milliGRAM(s) Oral every 6 hours PRN Mild Pain (1 - 3), Moderate Pain (4 - 6)  ALBUTerol   0.5% 2.5 milliGRAM(s) Nebulizer every 6 hours PRN Wheezing Hanane Jones MD PGY1     Pager [phone]/ [phone]    For Night coverage 7pm-7am: NS- page 1443 Team1-3, page 1446 Team4 & Care Model  Sat/[place] Cross Coverage 12pm-7pm: NS- page 1443 for Team1-4, [facility]- pager forwarded to covering Resident      LONIISIDORO MOCTEZUMA  68y  Male    Subjective: No events overnight. Pt denies f/c, n/v, cp, sob. Pt states that his back pain is well-controlled.       T(C): 37.1 (19 @ 05:34), Max: 37.6 (19 @ 21:19)  HR: 92 (19 @ 05:34) (92 - 98)  BP: 132/64 (19 @ 05:34) (120/65 - 132/64)  RR: 16 (19 @ 05:34) (16 - 18)  SpO2: 98% (19 @ 05:34) (97% - 98%)  Wt(kg): --Vital Signs Last 24 Hrs  T(C): 37.1 (2019 05:34), Max: 37.6 (2019 21:19)  T(F): 98.7 (2019 05:34), Max: 99.6 (2019 21:19)  HR: 92 (2019 05:34) (92 - 98)  BP: 132/64 (2019 05:34) (120/65 - 132/64)  BP(mean): --  RR: 16 (2019 05:34) (16 - 18)  SpO2: 98% (2019 05:34) (97% - 98%)    PHYSICAL EXAM:  GENERAL: NAD, well-groomed, well-developed  HEAD:  Atraumatic, Normocephalic  EYES: EOMI, PERRLA, conjunctiva and sclera clear  ENMT: No tonsillar erythema, exudates, or enlargement; Moist mucous membranes, Good dentition, No lesions  NECK: Supple, No JVD, Normal thyroid  NERVOUS SYSTEM:  Alert & Oriented X3, Good concentration   CHEST/LUNG: DARIN wheezes  HEART: Regular rate and rhythm; No murmurs, rubs, or gallops  ABDOMEN: Soft, Nontender, Nondistended; Bowel sounds present  EXTREMITIES:  2+ Peripheral Pulses, No clubbing, cyanosis, or edema  LYMPH: No lymphadenopathy noted  SKIN: No rashes or lesions    Consultant(s) Notes Reviewed:  [x ] YES  [ ] NO  Care Discussed with Consultants/Other Providers [ x] YES  [ ] NO    LABS:                        6.7    8.55  )-----------( 469      ( 2019 05:39 )             23.5         133<L>  |  100  |  13  ----------------------------<  96  3.8   |  21<L>  |  0.82    Ca    10.5      2019 05:39  Phos  3.3       Mg     1.9         TPro  7.5  /  Alb  2.8<L>  /  TBili  0.7  /  DBili  x   /  AST  17  /  ALT  16  /  AlkPhos  281<H>      PT/INR - ( 2019 10:51 )   PT: 15.8 SEC;   INR: 1.41          PTT - ( 2019 10:51 )  PTT:35.3 SEC  Urinalysis Basic - ( 2019 10:55 )    Color: YELLOW / Appearance: Lt TURBID / S.023 / pH: 6.5  Gluc: NEGATIVE / Ketone: NEGATIVE  / Bili: NEGATIVE / Urobili: TRACE   Blood: TRACE / Protein: 100 / Nitrite: NEGATIVE   Leuk Esterase: NEGATIVE / RBC: 3-5 / WBC 6-10   Sq Epi: FEW / Non Sq Epi: x / Bacteria: FEW            RADIOLOGY, EKG & ADDITIONAL TESTS: Reviewed.         RADIOLOGY & ADDITIONAL TESTS:    Imaging Personally Reviewed:  [ ] YES  [ ] NO  MedsMEDICATIONS  (STANDING):  enoxaparin Injectable 40 milliGRAM(s) SubCutaneous daily  pantoprazole    Tablet 40 milliGRAM(s) Oral before breakfast    MEDICATIONS  (PRN):  acetaminophen   Tablet .. 650 milliGRAM(s) Oral every 6 hours PRN Mild Pain (1 - 3), Moderate Pain (4 - 6)  ALBUTerol   0.5% 2.5 milliGRAM(s) Nebulizer every 6 hours PRN Wheezing

## 2019-01-25 NOTE — PROGRESS NOTE ADULT - SUBJECTIVE AND OBJECTIVE BOX
Subjective:  Patient well this AM.  Brain MRI obtained, awaiting final radiology read.  Hgb downtrending.    Objectives:  T(C): 37.1 (19 @ 05:34), Max: 37.6 (19 @ 21:19)  HR: 92 (19 @ 05:34) (92 - 98)  BP: 132/64 (19 @ 05:34) (120/65 - 132/64)  RR: 16 (19 @ 05:34) (16 - 18)  SpO2: 98% (19 @ 05:34) (97% - 98%)  Wt(kg): --    Physcial Exam  GENERAL: NAD, well-developed  PSYCH: AAOx3    LABS:                        7.0    9.88  )-----------( 472      ( 2019 06:00 )             23.9         136  |  101  |  15  ----------------------------<  84  4.3   |  22  |  0.94    Ca    10.3      2019 06:00  Phos  3.5       Mg     1.8         TPro  6.6  /  Alb  2.8<L>  /  TBili  0.7  /  DBili  x   /  AST  21  /  ALT  17  /  AlkPhos  308<H>      CAPILLARY BLOOD GLUCOSE        PT/INR - ( 2019 10:51 )   PT: 15.8 SEC;   INR: 1.41          PTT - ( 2019 10:51 )  PTT:35.3 SEC  CAPILLARY BLOOD GLUCOSE        Urinalysis Basic - ( 2019 10:55 )    Color: YELLOW / Appearance: Lt TURBID / S.023 / pH: 6.5  Gluc: NEGATIVE / Ketone: NEGATIVE  / Bili: NEGATIVE / Urobili: TRACE   Blood: TRACE / Protein: 100 / Nitrite: NEGATIVE   Leuk Esterase: NEGATIVE / RBC: 3-5 / WBC 6-10   Sq Epi: FEW / Non Sq Epi: x / Bacteria: FEW

## 2019-01-25 NOTE — PROGRESS NOTE ADULT - PROBLEM SELECTOR PLAN 1
Pt w/ new productive cough in setting of extensive smoking hx.   - CT chest shows large RUL mass w/ left lower lobe mass, innumerable bilateral nodular opacities, and mediastinal/bilateral hilar lymphadenopathy. Also w/ b/l adrenal metastases.   - CT A/P showing 11.1 cm necrotic mass inseparable from the lower pole of the left kidney, b/l adrenal nodules and left para-aortic lymphadenopathy.  - pulm recommends IR biopsy  - IR consulted, will f/u re recs   - H/O consulted, will f/u recs re which lesion best to biopsy for staging  - MTI head ordered to evaluate for brain mets Pt w/ new productive cough in setting of extensive smoking hx. Concern for Lung CA vs RCC.   - CT chest shows large RUL mass w/ left lower lobe mass, innumerable bilateral nodular opacities, and mediastinal/bilateral hilar lymphadenopathy. Also w/ b/l adrenal metastases.   - CT A/P showing 11.1 cm necrotic mass inseparable from the lower pole of the left kidney, b/l adrenal nodules and left para-aortic lymphadenopathy.  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

## 2019-01-25 NOTE — PROGRESS NOTE ADULT - PROBLEM SELECTOR PLAN 6
Microcytic. Pt has never had a colonoscopy.   - likely 2/2 chronic disease and iron deficiency  - s/p 1U pRBCs

## 2019-01-25 NOTE — PROGRESS NOTE ADULT - ASSESSMENT
Assessment & Plan: 68M w/ b/l lung, b/l adrenal masses, and large L renal mass.  Hx remarkable for extensive smoking history and exposure risk.  Unclear at this time whether lung masses are primary lung ca vs metastatic renal ca.  Renal mass is RCC until proven otherwise.        - F/u official MRI read of brain   - Recommend bone scan to evaluate for possibility of bone involvement given increased calcium and elevated Alk Phos.  - Trend CBC  - Given Hgb 7.0, recommend transfusion of 2 u pRBCs  - Will continue to follow.

## 2019-01-25 NOTE — PROGRESS NOTE ADULT - ASSESSMENT
68M presenting with back pain, found to have large RUL mass, LLL mass, bilateral nodular opacities, mediastinal/hilar lymphadenopathy, bilateral adrenal nodules and a large left renal necrotic mass concerning for malignancy.     # Pulmonary nodules with mediastinal/hilar lymphadenopathy: also with adrenal nodules as well as renal mass   - concerning for primary lung cancer with metastases to other lung as well as adrenals; possible also renal as primary vs two primary malignancies (renal and lung)   - recommend biopsy of lung mass; this is planned for Monday  - MRI brain shows metastatic lesion with associated edema. Would start decadron 4 mg q6h and consult neurosurgery. If no surgical intervention planned, would consult radiation oncology for further evaluation  - Hold off on DVT ppx until primary identified     Discussed with attending,  Divine Ha, PGY-4  Hematology-Oncology Fellow  [phone] ([place]) [phone] ([facility])

## 2019-01-25 NOTE — PROGRESS NOTE ADULT - ASSESSMENT
Pt is a 68 year old man w/ a PMHx GERD presenting w/ progressive back pain x4 months. CT chest showing large RUL mass and LLL mass, numerous b/l nodular opacities, and mediastinal hilar lymphadenopathy concerning for malignancy. Pt is a 68 year old man w/ a PMHx GERD presenting w/ progressive back pain x4 months. CT chest showing large RUL mass and LLL mass, numerous b/l nodular opacities, and mediastinal hilar lymphadenopathy concerning for malignancy. CT A/P showing 11 cm L renal mass. 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

## 2019-01-25 NOTE — PROGRESS NOTE ADULT - SUBJECTIVE AND OBJECTIVE BOX
INTERVAL HPI/OVERNIGHT EVENTS:  Pt has no complaints this AM. No pain, dyspnea, fevers, weakness, or headache.     MEDICATIONS  (STANDING):  ALBUTerol/ipratropium for Nebulization 3 milliLiter(s) Nebulizer every 6 hours  dexamethasone     Tablet 4 milliGRAM(s) Oral every 6 hours  pantoprazole    Tablet 40 milliGRAM(s) Oral before breakfast    MEDICATIONS  (PRN):  acetaminophen   Tablet .. 650 milliGRAM(s) Oral every 6 hours PRN Mild Pain (1 - 3), Moderate Pain (4 - 6)    Allergies    No Known Allergies    Intolerances          VITAL SIGNS:  T(F): 99.4 (01-25-19 @ 13:54)  HR: 96 (01-25-19 @ 13:54)  BP: 113/68 (01-25-19 @ 13:54)  RR: 18 (01-25-19 @ 13:54)  SpO2: 98% (01-25-19 @ 13:54)  Wt(kg): --    PHYSICAL EXAM:    Constitutional: NAD, lying comfortably in bed  Eyes: EOMI, PERRLA  Neck: supple, no masses, no JVD  Respiratory: CTAB; no r/r/w  Cardiovascular: RRR, no M/R/G  Gastrointestinal: +BS, soft, NTND, no hepatosplenomegaly  Extremities: no c/c/e  Neurological: AAOx3, nonfocal    LABS:                        6.7    8.55  )-----------( 469      ( 25 Jan 2019 05:39 )             23.5     01-25    133<L>  |  100  |  13  ----------------------------<  96  3.8   |  21<L>  |  0.82    Ca    10.5      25 Jan 2019 05:39  Phos  3.3     01-25  Mg     1.9     01-25    TPro  7.5  /  Alb  2.8<L>  /  TBili  0.7  /  DBili  x   /  AST  17  /  ALT  16  /  AlkPhos  281<H>  01-25          RADIOLOGY & ADDITIONAL TESTS:  Studies reviewed.

## 2019-01-25 NOTE — CONSULT NOTE ADULT - PROBLEM SELECTOR RECOMMENDATION 9
No acute neurosurgical intervention   Recommend outpatient SRS (stereotactic radiosurgery)  Agree with biopsy of lung/adrenal mass for primary diagnosis   Case and films d/w attending

## 2019-01-25 NOTE — PROGRESS NOTE ADULT - PROBLEM SELECTOR PLAN 2
Ca 11.5 in setting of possible lung malignancy.  - asymptomatic, does not currently require intervention  - will monitor  - PTH decreased, will f/u PTHrP - CT A/P showing 11.1 cm necrotic mass inseparable from the lower pole of the left kidney, b/l adrenal nodules and left para-aortic lymphadenopathy.  - concern for RCC  - plan for IR lung biopsy on Monday

## 2019-01-25 NOTE — CONSULT NOTE ADULT - SUBJECTIVE AND OBJECTIVE BOX
HPI:  Pt is a 68 year old man w/ a PMHx GERD, smoking presenting w/ progressive back pain x4 months. Pt states that the back pain is dull, intermittent, and located inferior to his right scapula, and occasinally wraps around to the side of his chest. Pt states that the pain has gotten worse over the past few months, and it worsens with movement. It frequently awakens him from sleep, but improves with two Advil liquid capsules. Pt also endorses multiple months of an intermittent cough productive of yellow sputum. Pt went to see a doctor 3 months ago for evaluation of the cough at which time his weight was >20 lbs less than his baseline. He becomes sob with activity but not at rest. Pt states he also has a decreased appetite. He also has occasional subjective fevers/chills. He has also had a recent polyuria, waking every 3 hours to urinate at night. The patient quit his job as a  three months ago on account of his symptoms. Pt denies cp, changes in BMs, dysuria, abdominal pain, HAs, n/v.     In the ED, VS: Temp 98, , /67, RR 16 SpO2 100%. , .6. Alk phos 321, Ca 11.5. CXR showed possible RUL PNA and multiple opacities. CT chest: RUL and LLL lesion, numerous opacities, hilar lymphadenopathy, b/l adrenal mets. (23 Jan 2019 14:03)    PAST MEDICAL & SURGICAL HISTORY:  GERD (gastroesophageal reflux disease)  Appendicitis  History of appendectomy    Per patient denies headache, nausea, vomiting or mental status changes.     Allergies    No Known Allergies    acetaminophen   Tablet .. 650 milliGRAM(s) Oral every 6 hours PRN  ALBUTerol/ipratropium for Nebulization 3 milliLiter(s) Nebulizer every 6 hours  dexamethasone     Tablet 4 milliGRAM(s) Oral every 6 hours  pantoprazole    Tablet 40 milliGRAM(s) Oral before breakfast    SOCIAL HISTORY: + smoking hx 40 yrs   FAMILY HISTORY:  No pertinent family history in first degree relatives    Vital Signs Last 24 Hrs  T(C): 37.4 (25 Jan 2019 13:54), Max: 37.6 (24 Jan 2019 21:19)  T(F): 99.4 (25 Jan 2019 13:54), Max: 99.6 (24 Jan 2019 21:19)  HR: 96 (25 Jan 2019 13:54) (73 - 98)  BP: 113/68 (25 Jan 2019 13:54) (113/68 - 142/65)  RR: 18 (25 Jan 2019 13:54) (16 - 18)  SpO2: 98% (25 Jan 2019 13:54) (97% - 98%)    PHYSICAL EXAM:  Awake Alert Attentive Affect appropriate  Cranial Nerves II-XII Intact  Pupils: PERRL  Speech intact, clear  Motor- Moving all extremities well  No drift       LABS:                          6.7    8.55  )-----------( 469      ( 25 Jan 2019 05:39 )             23.5     01-25    133<L>  |  100  |  13  ----------------------------<  96  3.8   |  21<L>  |  0.82    Ca    10.5      25 Jan 2019 05:39  Phos  3.3     01-25  Mg     1.9     01-25    TPro  7.5  /  Alb  2.8<L>  /  TBili  0.7  /  DBili  x   /  AST  17  /  ALT  16  /  AlkPhos  281<H>  01-25          RADIOLOGY & ADDITIONAL STUDIES:  < from: MR Head w/wo IV Cont (01.24.19 @ 19:19) >    IMPRESSION:    Left sided frontoparietal metastasis with associated edema.    Microvascular-type changes.    < end of copied text >

## 2019-01-25 NOTE — PROVIDER CONTACT NOTE (OTHER) - BACKGROUND
Patient came in for nonspecific abnormal findings on radiological and other examination of lung field

## 2019-01-25 NOTE — PROGRESS NOTE ADULT - PROBLEM SELECTOR PLAN 4
Microcytic. Pt has never had a colonoscopy.   - will f/u iron panel Ca 11.5 in setting of malignancy  - asymptomatic, does not currently require intervention  - will monitor  - PTH decreased; will f/u PTHrP

## 2019-01-26 LAB
ALBUMIN SERPL ELPH-MCNC: 2.9 G/DL — LOW (ref 3.3–5)
ALP SERPL-CCNC: 287 U/L — HIGH (ref 40–120)
ALT FLD-CCNC: 16 U/L — SIGNIFICANT CHANGE UP (ref 4–41)
ANION GAP SERPL CALC-SCNC: 12 MMO/L — SIGNIFICANT CHANGE UP (ref 7–14)
AST SERPL-CCNC: 16 U/L — SIGNIFICANT CHANGE UP (ref 4–40)
BILIRUB SERPL-MCNC: 0.5 MG/DL — SIGNIFICANT CHANGE UP (ref 0.2–1.2)
BUN SERPL-MCNC: 14 MG/DL — SIGNIFICANT CHANGE UP (ref 7–23)
CALCIUM SERPL-MCNC: 10.9 MG/DL — HIGH (ref 8.4–10.5)
CHLORIDE SERPL-SCNC: 100 MMOL/L — SIGNIFICANT CHANGE UP (ref 98–107)
CO2 SERPL-SCNC: 23 MMOL/L — SIGNIFICANT CHANGE UP (ref 22–31)
CREAT SERPL-MCNC: 0.84 MG/DL — SIGNIFICANT CHANGE UP (ref 0.5–1.3)
GLUCOSE SERPL-MCNC: 175 MG/DL — HIGH (ref 70–99)
HCT VFR BLD CALC: 28.6 % — LOW (ref 39–50)
HGB BLD-MCNC: 8 G/DL — LOW (ref 13–17)
MAGNESIUM SERPL-MCNC: 2.1 MG/DL — SIGNIFICANT CHANGE UP (ref 1.6–2.6)
MCHC RBC-ENTMCNC: 21.3 PG — LOW (ref 27–34)
MCHC RBC-ENTMCNC: 28 % — LOW (ref 32–36)
MCV RBC AUTO: 76.3 FL — LOW (ref 80–100)
NRBC # FLD: 0 K/UL — LOW (ref 25–125)
PHOSPHATE SERPL-MCNC: 3.9 MG/DL — SIGNIFICANT CHANGE UP (ref 2.5–4.5)
PLATELET # BLD AUTO: 518 K/UL — HIGH (ref 150–400)
PMV BLD: 9.1 FL — SIGNIFICANT CHANGE UP (ref 7–13)
POTASSIUM SERPL-MCNC: 4.9 MMOL/L — SIGNIFICANT CHANGE UP (ref 3.5–5.3)
POTASSIUM SERPL-SCNC: 4.9 MMOL/L — SIGNIFICANT CHANGE UP (ref 3.5–5.3)
PROT SERPL-MCNC: 8.2 G/DL — SIGNIFICANT CHANGE UP (ref 6–8.3)
RBC # BLD: 3.75 M/UL — LOW (ref 4.2–5.8)
RBC # FLD: 18.2 % — HIGH (ref 10.3–14.5)
SODIUM SERPL-SCNC: 135 MMOL/L — SIGNIFICANT CHANGE UP (ref 135–145)
WBC # BLD: 6.53 K/UL — SIGNIFICANT CHANGE UP (ref 3.8–10.5)
WBC # FLD AUTO: 6.53 K/UL — SIGNIFICANT CHANGE UP (ref 3.8–10.5)

## 2019-01-26 PROCEDURE — 99232 SBSQ HOSP IP/OBS MODERATE 35: CPT | Mod: GC

## 2019-01-26 RX ADMIN — Medication 3 MILLILITER(S): at 03:49

## 2019-01-26 RX ADMIN — Medication 4 MILLIGRAM(S): at 05:50

## 2019-01-26 RX ADMIN — Medication 4 MILLIGRAM(S): at 13:01

## 2019-01-26 RX ADMIN — Medication 4 MILLIGRAM(S): at 23:31

## 2019-01-26 RX ADMIN — PANTOPRAZOLE SODIUM 40 MILLIGRAM(S): 20 TABLET, DELAYED RELEASE ORAL at 05:50

## 2019-01-26 RX ADMIN — Medication 4 MILLIGRAM(S): at 17:42

## 2019-01-26 RX ADMIN — Medication 3 MILLILITER(S): at 22:52

## 2019-01-26 RX ADMIN — Medication 3 MILLILITER(S): at 10:38

## 2019-01-26 RX ADMIN — Medication 3 MILLILITER(S): at 16:36

## 2019-01-26 NOTE — PROGRESS NOTE ADULT - SUBJECTIVE AND OBJECTIVE BOX
Patient seen and examined.  No acute events overnight.    T(F): 98.4, Max: 99.4 (01-25-19 @ 13:54)  HR: 90  BP: 118/65  SpO2: 98%    Gen NAD  Resp No distress    01-26 @ 07:25  WBC 6.53  / Hct 28.6  / SCr 0.84     01-25 @ 05:39  WBC 8.55  / Hct 23.5  / SCr 0.82

## 2019-01-26 NOTE — PROGRESS NOTE ADULT - PROBLEM SELECTOR PLAN 3
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  - pt started on decadron  - NS c/s  - will c/s rad onc on monday

## 2019-01-26 NOTE — PROGRESS NOTE ADULT - PROBLEM SELECTOR PLAN 6
Microcytic. Pt has never had a colonoscopy.   - likely 2/2 chronic disease and iron deficiency  - s/p 1U pRBCs with appropriate response

## 2019-01-26 NOTE — PROGRESS NOTE ADULT - ASSESSMENT
Assessment & Plan: 68M w/ b/l lung, b/l adrenal masses, and large L renal mass.  Hx remarkable for extensive smoking history and exposure risk.  Unclear at this time whether lung masses are primary lung ca vs metastatic renal ca.  Renal mass is RCC until proven otherwise.        - Recommend bone scan to evaluate for possibility of bone involvement given increased calcium and elevated Alk Phos.  - Trend CBC  - Follow up heme onc, neurosurgery  - Planned for lung biopsy Monday  - Will continue to follow

## 2019-01-26 NOTE — PROGRESS NOTE ADULT - ASSESSMENT
Pt is a 68 year old man w/ a PMHx GERD presenting w/ progressive back pain x4 months. CT chest showing large RUL mass and LLL mass, numerous b/l nodular opacities, and mediastinal hilar lymphadenopathy concerning for malignancy. CT A/P showing 11 cm L renal mass. MRI brain showing left sided frontoparietal metastasis with associated edema, pending IR biopsy on Monday

## 2019-01-26 NOTE — PROGRESS NOTE ADULT - PROBLEM SELECTOR PLAN 2
- CT A/P showing 11.1 cm necrotic mass inseparable from the lower pole of the left kidney, b/l adrenal nodules and left para-aortic lymphadenopathy.  - concern for RCC  - plan for IR lung biopsy on Monday

## 2019-01-26 NOTE — PROGRESS NOTE ADULT - PROBLEM SELECTOR PLAN 4
Ca 11.5 in setting of malignancy  - asymptomatic, does not currently require intervention  - will monitor  - PTH decreased; will f/u PTHrP  Urology requesting bone scan however at this point would not , defer to outpatient

## 2019-01-26 NOTE — PROGRESS NOTE ADULT - SUBJECTIVE AND OBJECTIVE BOX
Patient is a 68y old  Male who presents with a chief complaint of     SUBJECTIVE / OVERNIGHT EVENTS: No acute overnight events. No complaints this AM. Patient denies CP, SOB. Patient breathing comfortably.       MEDICATIONS  (STANDING):  ALBUTerol/ipratropium for Nebulization 3 milliLiter(s) Nebulizer every 6 hours  dexamethasone     Tablet 4 milliGRAM(s) Oral every 6 hours  pantoprazole    Tablet 40 milliGRAM(s) Oral before breakfast    MEDICATIONS  (PRN):  acetaminophen   Tablet .. 650 milliGRAM(s) Oral every 6 hours PRN Mild Pain (1 - 3), Moderate Pain (4 - 6)      T(C): 36.9 (01-26-19 @ 06:03), Max: 37.4 (01-25-19 @ 13:54)  HR: 87 (01-26-19 @ 10:38) (86 - 102)  BP: 118/65 (01-26-19 @ 06:03) (113/68 - 118/65)  RR: 18 (01-26-19 @ 06:03) (18 - 18)  SpO2: 98% (01-26-19 @ 06:03) (96% - 98%)    PHYSICAL EXAM  GENERAL: NAD, well-developed  NEURO: AO x3  HEAD:  Atraumatic, Normocephalic  EYES: conjunctiva and sclera clear  NECK: Supple  CHEST/LUNG: Clear to auscultation bilaterally; No wheezes, rales or rhonchi  HEART: Regular rate and rhythm; No murmurs, rubs, or gallops  ABDOMEN: Soft, Nontender, Nondistended; Bowel sounds present, no masses.  EXTREMITIES:  2+ Peripheral Pulses, No clubbing, cyanosis, or edema  SKIN: Warm, dry, in tact, no rashes or lesions  PSYCH: affect appropriate    LABS:                        8.0    6.53  )-----------( 518      ( 26 Jan 2019 07:25 )             28.6     01-26    135  |  100  |  14  ----------------------------<  175<H>  4.9   |  23  |  0.84    Ca    10.9<H>      26 Jan 2019 07:25  Phos  3.9     01-26  Mg     2.1     01-26    TPro  8.2  /  Alb  2.9<L>  /  TBili  0.5  /  DBili  x   /  AST  16  /  ALT  16  /  AlkPhos  287<H>  01-26            I&O's Summary      Dalia Duckworth MD  Internal Medicine Resident, PGY1  Pager: [phone] / [phone]

## 2019-01-26 NOTE — PROGRESS NOTE ADULT - PROBLEM SELECTOR PLAN 1
Pt w/ new productive cough in setting of extensive smoking hx. Concern for Lung CA vs RCC.   - CT chest shows large RUL mass w/ left lower lobe mass, innumerable bilateral nodular opacities, and mediastinal/bilateral hilar lymphadenopathy. Also w/ b/l adrenal metastases.   - CT A/P showing 11.1 cm necrotic mass inseparable from the lower pole of the left kidney, b/l adrenal nodules and left para-aortic lymphadenopathy.  - IR lung biopsy scheduled for Monday, NPO after midnight on Sunday   - H/O following  - MRI head showing left sided frontoparietal metastasis with associated edema, started on decadron

## 2019-01-27 LAB
HCT VFR BLD CALC: 26.8 % — LOW (ref 39–50)
HGB BLD-MCNC: 7.4 G/DL — LOW (ref 13–17)
MCHC RBC-ENTMCNC: 21.3 PG — LOW (ref 27–34)
MCHC RBC-ENTMCNC: 27.6 % — LOW (ref 32–36)
MCV RBC AUTO: 77 FL — LOW (ref 80–100)
NRBC # FLD: 0 K/UL — LOW (ref 25–125)
PLATELET # BLD AUTO: 522 K/UL — HIGH (ref 150–400)
PMV BLD: 9.5 FL — SIGNIFICANT CHANGE UP (ref 7–13)
RBC # BLD: 3.48 M/UL — LOW (ref 4.2–5.8)
RBC # FLD: 18.1 % — HIGH (ref 10.3–14.5)
WBC # BLD: 21.04 K/UL — HIGH (ref 3.8–10.5)
WBC # FLD AUTO: 21.04 K/UL — HIGH (ref 3.8–10.5)

## 2019-01-27 PROCEDURE — 99233 SBSQ HOSP IP/OBS HIGH 50: CPT | Mod: GC

## 2019-01-27 RX ORDER — DEXTROSE 50 % IN WATER 50 %
15 SYRINGE (ML) INTRAVENOUS ONCE
Qty: 0 | Refills: 0 | Status: DISCONTINUED | OUTPATIENT
Start: 2019-01-27 | End: 2019-01-28

## 2019-01-27 RX ORDER — INSULIN LISPRO 100/ML
VIAL (ML) SUBCUTANEOUS AT BEDTIME
Qty: 0 | Refills: 0 | Status: DISCONTINUED | OUTPATIENT
Start: 2019-01-27 | End: 2019-01-28

## 2019-01-27 RX ORDER — DEXTROSE 50 % IN WATER 50 %
25 SYRINGE (ML) INTRAVENOUS ONCE
Qty: 0 | Refills: 0 | Status: DISCONTINUED | OUTPATIENT
Start: 2019-01-27 | End: 2019-01-28

## 2019-01-27 RX ORDER — SODIUM CHLORIDE 9 MG/ML
1000 INJECTION, SOLUTION INTRAVENOUS
Qty: 0 | Refills: 0 | Status: DISCONTINUED | OUTPATIENT
Start: 2019-01-27 | End: 2019-01-28

## 2019-01-27 RX ORDER — INSULIN LISPRO 100/ML
VIAL (ML) SUBCUTANEOUS
Qty: 0 | Refills: 0 | Status: DISCONTINUED | OUTPATIENT
Start: 2019-01-27 | End: 2019-01-28

## 2019-01-27 RX ORDER — DEXTROSE 50 % IN WATER 50 %
12.5 SYRINGE (ML) INTRAVENOUS ONCE
Qty: 0 | Refills: 0 | Status: DISCONTINUED | OUTPATIENT
Start: 2019-01-27 | End: 2019-01-28

## 2019-01-27 RX ORDER — GLUCAGON INJECTION, SOLUTION 0.5 MG/.1ML
1 INJECTION, SOLUTION SUBCUTANEOUS ONCE
Qty: 0 | Refills: 0 | Status: DISCONTINUED | OUTPATIENT
Start: 2019-01-27 | End: 2019-01-28

## 2019-01-27 RX ADMIN — Medication 4 MILLIGRAM(S): at 18:19

## 2019-01-27 RX ADMIN — Medication 3 MILLILITER(S): at 10:05

## 2019-01-27 RX ADMIN — Medication 4 MILLIGRAM(S): at 13:35

## 2019-01-27 RX ADMIN — Medication 3 MILLILITER(S): at 22:10

## 2019-01-27 RX ADMIN — Medication 3: at 18:18

## 2019-01-27 RX ADMIN — Medication 3 MILLILITER(S): at 16:04

## 2019-01-27 RX ADMIN — Medication 3: at 13:35

## 2019-01-27 RX ADMIN — Medication 3 MILLILITER(S): at 03:10

## 2019-01-27 RX ADMIN — PANTOPRAZOLE SODIUM 40 MILLIGRAM(S): 20 TABLET, DELAYED RELEASE ORAL at 06:06

## 2019-01-27 RX ADMIN — Medication 4 MILLIGRAM(S): at 06:06

## 2019-01-27 NOTE — PROGRESS NOTE ADULT - PROBLEM SELECTOR PLAN 4
Ca 11.5 in setting of malignancy  - asymptomatic, does not currently require intervention  - will monitor  - PTH decreased; will f/u PTHrP  Urology requesting bone scan however at this point would not , defer to outpatient Ca 11.5 in setting of malignancy  - asymptomatic, does not currently require intervention  - will monitor  - PTH decreased; will f/u PTHrP  - Urology requesting bone scan however at this point would not , defer to outpatient

## 2019-01-27 NOTE — PROGRESS NOTE ADULT - SUBJECTIVE AND OBJECTIVE BOX
Hanane Jones MD PGY1     Pager [phone]/ [phone]    For Night coverage 7pm-7am: NS- page 1443 Team1-3, page [phone] Team4 & Care Model  Sat/[place] Cross Coverage 12pm-7pm: NS- page 1443 for Team1-4, [facility]- pager forwarded to covering Resident      LONIISIDORO MOCTEZUMA  68y  Male    Complaints:  Subjective:             T(C): 36.2 (01-27-19 @ 05:34), Max: 36.6 (01-27-19 @ 02:37)  HR: 96 (01-27-19 @ 05:34) (85 - 97)  BP: 121/78 (01-27-19 @ 05:34) (110/60 - 133/76)  RR: 18 (01-27-19 @ 05:34) (18 - 20)  SpO2: 100% (01-27-19 @ 05:34) (97% - 100%)  Wt(kg): --Vital Signs Last 24 Hrs  T(C): 36.2 (27 Jan 2019 05:34), Max: 36.6 (27 Jan 2019 02:37)  T(F): 97.2 (27 Jan 2019 05:34), Max: 97.8 (27 Jan 2019 02:37)  HR: 96 (27 Jan 2019 05:34) (85 - 97)  BP: 121/78 (27 Jan 2019 05:34) (110/60 - 133/76)  BP(mean): --  RR: 18 (27 Jan 2019 05:34) (18 - 20)  SpO2: 100% (27 Jan 2019 05:34) (97% - 100%)    PHYSICAL EXAM:  GENERAL: NAD, well-groomed, well-developed  HEAD:  Atraumatic, Normocephalic  EYES: EOMI, PERRLA, conjunctiva and sclera clear  ENMT: No tonsillar erythema, exudates, or enlargement; Moist mucous membranes, Good dentition, No lesions  NECK: Supple, No JVD, Normal thyroid  NERVOUS SYSTEM:  Alert & Oriented X3, Good concentration; Motor Strength 5/5 B/L upper and lower extremities; DTRs 2+ intact and symmetric  CHEST/LUNG: Clear to percussion bilaterally; No rales, rhonchi, wheezing, or rubs  HEART: Regular rate and rhythm; No murmurs, rubs, or gallops  ABDOMEN: Soft, Nontender, Nondistended; Bowel sounds present  EXTREMITIES:  2+ Peripheral Pulses, No clubbing, cyanosis, or edema  LYMPH: No lymphadenopathy noted  SKIN: No rashes or lesions    Consultant(s) Notes Reviewed:  [x ] YES  [ ] NO  Care Discussed with Consultants/Other Providers [ x] YES  [ ] NO    LABS:          RADIOLOGY & ADDITIONAL TESTS:    Imaging Personally Reviewed:  [ ] YES  [ ] NO  MedsMEDICATIONS  (STANDING):  ALBUTerol/ipratropium for Nebulization 3 milliLiter(s) Nebulizer every 6 hours  dexamethasone     Tablet 4 milliGRAM(s) Oral every 6 hours  dextrose 5%. 1000 milliLiter(s) (50 mL/Hr) IV Continuous <Continuous>  dextrose 50% Injectable 12.5 Gram(s) IV Push once  dextrose 50% Injectable 25 Gram(s) IV Push once  dextrose 50% Injectable 25 Gram(s) IV Push once  insulin lispro (HumaLOG) corrective regimen sliding scale   SubCutaneous three times a day before meals  insulin lispro (HumaLOG) corrective regimen sliding scale   SubCutaneous at bedtime  pantoprazole    Tablet 40 milliGRAM(s) Oral before breakfast    MEDICATIONS  (PRN):  acetaminophen   Tablet .. 650 milliGRAM(s) Oral every 6 hours PRN Mild Pain (1 - 3), Moderate Pain (4 - 6)  dextrose 40% Gel 15 Gram(s) Oral once PRN Blood Glucose LESS THAN 70 milliGRAM(s)/deciliter  glucagon  Injectable 1 milliGRAM(s) IntraMuscular once PRN Glucose LESS THAN 70 milligrams/deciliter Hanane Jones MD PGY1     Pager [phone]/ [phone]    For Night coverage 7pm-7am: NS- page 1443 Team1-3, page 1446 Team4 & Care Model  Sat/[place] Cross Coverage 12pm-7pm: NS- page 1443 for Team1-4, [facility]- pager forwarded to covering Resident      BORISCAMERONISIDORO MOCTEZUMA  68y  Male    Subjective: No events overnight. Pt denies cp, sob, f/c, n/v. Pt states he has not been able to sleep and is very fatigued.       T(C): 36.2 (01-27-19 @ 05:34), Max: 36.6 (01-27-19 @ 02:37)  HR: 96 (01-27-19 @ 05:34) (85 - 97)  BP: 121/78 (01-27-19 @ 05:34) (110/60 - 133/76)  RR: 18 (01-27-19 @ 05:34) (18 - 20)  SpO2: 100% (01-27-19 @ 05:34) (97% - 100%)  Wt(kg): --Vital Signs Last 24 Hrs  T(C): 36.2 (27 Jan 2019 05:34), Max: 36.6 (27 Jan 2019 02:37)  T(F): 97.2 (27 Jan 2019 05:34), Max: 97.8 (27 Jan 2019 02:37)  HR: 96 (27 Jan 2019 05:34) (85 - 97)  BP: 121/78 (27 Jan 2019 05:34) (110/60 - 133/76)  BP(mean): --  RR: 18 (27 Jan 2019 05:34) (18 - 20)  SpO2: 100% (27 Jan 2019 05:34) (97% - 100%)    PHYSICAL EXAM:  GENERAL: NAD, well-groomed, well-developed  HEAD:  Atraumatic, Normocephalic  EYES: EOMI, PERRLA, conjunctiva and sclera clear  ENMT: No tonsillar erythema, exudates, or enlargement; Moist mucous membranes, Good dentition, No lesions  NECK: Supple, No JVD, Normal thyroid  NERVOUS SYSTEM:  Alert & Oriented X3, Good concentration   CHEST/LUNG: Clear to percussion bilaterally; No rales, rhonchi, wheezing, or rubs  HEART: Regular rate and rhythm; No murmurs, rubs, or gallops  ABDOMEN: Soft, Nontender, Nondistended; Bowel sounds present  EXTREMITIES:  2+ Peripheral Pulses, No clubbing, cyanosis, or edema  LYMPH: No lymphadenopathy noted  SKIN: No rashes or lesions    Consultant(s) Notes Reviewed:  [x ] YES  [ ] NO  Care Discussed with Consultants/Other Providers [ x] YES  [ ] NO    LABS:                          7.4    21.04 )-----------( 522      ( 27 Jan 2019 07:20 )             26.8     01-26    135  |  100  |  14  ----------------------------<  175<H>  4.9   |  23  |  0.84    Ca    10.9<H>      26 Jan 2019 07:25  Phos  3.9     01-26  Mg     2.1     01-26    TPro  8.2  /  Alb  2.9<L>  /  TBili  0.5  /  DBili  x   /  AST  16  /  ALT  16  /  AlkPhos  287<H>  01-26              RADIOLOGY, EKG & ADDITIONAL TESTS: Reviewed.       RADIOLOGY & ADDITIONAL TESTS:    Imaging Personally Reviewed:  [ ] YES  [ ] NO  MedsMEDICATIONS  (STANDING):  ALBUTerol/ipratropium for Nebulization 3 milliLiter(s) Nebulizer every 6 hours  dexamethasone     Tablet 4 milliGRAM(s) Oral every 6 hours  dextrose 5%. 1000 milliLiter(s) (50 mL/Hr) IV Continuous <Continuous>  dextrose 50% Injectable 12.5 Gram(s) IV Push once  dextrose 50% Injectable 25 Gram(s) IV Push once  dextrose 50% Injectable 25 Gram(s) IV Push once  insulin lispro (HumaLOG) corrective regimen sliding scale   SubCutaneous three times a day before meals  insulin lispro (HumaLOG) corrective regimen sliding scale   SubCutaneous at bedtime  pantoprazole    Tablet 40 milliGRAM(s) Oral before breakfast    MEDICATIONS  (PRN):  acetaminophen   Tablet .. 650 milliGRAM(s) Oral every 6 hours PRN Mild Pain (1 - 3), Moderate Pain (4 - 6)  dextrose 40% Gel 15 Gram(s) Oral once PRN Blood Glucose LESS THAN 70 milliGRAM(s)/deciliter  glucagon  Injectable 1 milliGRAM(s) IntraMuscular once PRN Glucose LESS THAN 70 milligrams/deciliter

## 2019-01-27 NOTE — PROGRESS NOTE ADULT - ASSESSMENT
Pt is a 68 year old man w/ a PMHx GERD presenting w/ progressive back pain x4 months. CT chest showing large RUL mass and LLL mass, numerous b/l nodular opacities, and mediastinal hilar lymphadenopathy concerning for malignancy. CT A/P showing 11 cm L renal mass. MRI brain showing left sided frontoparietal metastasis with associated edema, pending IR biopsy on Monday Pt is a 68 year old man w/ a PMHx GERD presenting w/ progressive back pain x4 months. CT chest showing large RUL mass and LLL mass, numerous b/l nodular opacities, and mediastinal hilar lymphadenopathy concerning for malignancy. CT A/P showing 11 cm L renal mass. MRI brain showing left sided frontoparietal metastasis with associated edema, pending IR biopsy on Monday.

## 2019-01-27 NOTE — PROGRESS NOTE ADULT - PROBLEM SELECTOR PLAN 3
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  - pt started on decadron  - NS c/s  - will c/s rad onc on monday MRI brain showing left sided frontoparietal metastasis with associated edema.  - pt started on decadron  - NS consulted, recommending ouptatient SRS  - will c/s rad onc on monday

## 2019-01-28 ENCOUNTER — RESULT REVIEW (OUTPATIENT)
Age: 69
End: 2019-01-28

## 2019-01-28 ENCOUNTER — TRANSCRIPTION ENCOUNTER (OUTPATIENT)
Age: 69
End: 2019-01-28

## 2019-01-28 VITALS
SYSTOLIC BLOOD PRESSURE: 132 MMHG | OXYGEN SATURATION: 100 % | TEMPERATURE: 98 F | RESPIRATION RATE: 18 BRPM | DIASTOLIC BLOOD PRESSURE: 61 MMHG | HEART RATE: 103 BPM

## 2019-01-28 DIAGNOSIS — R73.9 HYPERGLYCEMIA, UNSPECIFIED: ICD-10-CM

## 2019-01-28 PROBLEM — Z00.00 ENCOUNTER FOR PREVENTIVE HEALTH EXAMINATION: Status: ACTIVE | Noted: 2019-01-28

## 2019-01-28 PROBLEM — K37 UNSPECIFIED APPENDICITIS: Chronic | Status: ACTIVE | Noted: 2019-01-23

## 2019-01-28 PROBLEM — K21.9 GASTRO-ESOPHAGEAL REFLUX DISEASE WITHOUT ESOPHAGITIS: Chronic | Status: ACTIVE | Noted: 2019-01-23

## 2019-01-28 LAB
ALBUMIN SERPL ELPH-MCNC: 3 G/DL — LOW (ref 3.3–5)
ALP SERPL-CCNC: 217 U/L — HIGH (ref 40–120)
ALT FLD-CCNC: 19 U/L — SIGNIFICANT CHANGE UP (ref 4–41)
ANION GAP SERPL CALC-SCNC: 12 MMO/L — SIGNIFICANT CHANGE UP (ref 7–14)
APTT BLD: 33.3 SEC — SIGNIFICANT CHANGE UP (ref 27.5–36.3)
AST SERPL-CCNC: 18 U/L — SIGNIFICANT CHANGE UP (ref 4–40)
BILIRUB SERPL-MCNC: 0.3 MG/DL — SIGNIFICANT CHANGE UP (ref 0.2–1.2)
BUN SERPL-MCNC: 21 MG/DL — SIGNIFICANT CHANGE UP (ref 7–23)
CALCIUM SERPL-MCNC: 11 MG/DL — HIGH (ref 8.4–10.5)
CHLORIDE SERPL-SCNC: 103 MMOL/L — SIGNIFICANT CHANGE UP (ref 98–107)
CO2 SERPL-SCNC: 23 MMOL/L — SIGNIFICANT CHANGE UP (ref 22–31)
CREAT SERPL-MCNC: 0.81 MG/DL — SIGNIFICANT CHANGE UP (ref 0.5–1.3)
GLUCOSE SERPL-MCNC: 149 MG/DL — HIGH (ref 70–99)
HCT VFR BLD CALC: 28.2 % — LOW (ref 39–50)
HGB BLD-MCNC: 7.9 G/DL — LOW (ref 13–17)
INR BLD: 1.18 — HIGH (ref 0.88–1.17)
MAGNESIUM SERPL-MCNC: 2 MG/DL — SIGNIFICANT CHANGE UP (ref 1.6–2.6)
MCHC RBC-ENTMCNC: 21.9 PG — LOW (ref 27–34)
MCHC RBC-ENTMCNC: 28 % — LOW (ref 32–36)
MCV RBC AUTO: 78.3 FL — LOW (ref 80–100)
NRBC # FLD: 0 K/UL — LOW (ref 25–125)
PHOSPHATE SERPL-MCNC: 2.6 MG/DL — SIGNIFICANT CHANGE UP (ref 2.5–4.5)
PLATELET # BLD AUTO: 507 K/UL — HIGH (ref 150–400)
PMV BLD: 9 FL — SIGNIFICANT CHANGE UP (ref 7–13)
POTASSIUM SERPL-MCNC: 4.4 MMOL/L — SIGNIFICANT CHANGE UP (ref 3.5–5.3)
POTASSIUM SERPL-SCNC: 4.4 MMOL/L — SIGNIFICANT CHANGE UP (ref 3.5–5.3)
PROT SERPL-MCNC: 7.6 G/DL — SIGNIFICANT CHANGE UP (ref 6–8.3)
PROTHROM AB SERPL-ACNC: 13.2 SEC — HIGH (ref 9.8–13.1)
RBC # BLD: 3.6 M/UL — LOW (ref 4.2–5.8)
RBC # FLD: 18.6 % — HIGH (ref 10.3–14.5)
SODIUM SERPL-SCNC: 138 MMOL/L — SIGNIFICANT CHANGE UP (ref 135–145)
WBC # BLD: 20.26 K/UL — HIGH (ref 3.8–10.5)
WBC # FLD AUTO: 20.26 K/UL — HIGH (ref 3.8–10.5)

## 2019-01-28 PROCEDURE — 99233 SBSQ HOSP IP/OBS HIGH 50: CPT

## 2019-01-28 PROCEDURE — 88305 TISSUE EXAM BY PATHOLOGIST: CPT | Mod: 26

## 2019-01-28 PROCEDURE — 77012 CT SCAN FOR NEEDLE BIOPSY: CPT | Mod: 26

## 2019-01-28 PROCEDURE — 88173 CYTOPATH EVAL FNA REPORT: CPT | Mod: 26

## 2019-01-28 PROCEDURE — 32405: CPT

## 2019-01-28 PROCEDURE — 71045 X-RAY EXAM CHEST 1 VIEW: CPT | Mod: 26

## 2019-01-28 RX ORDER — FERROUS SULFATE 325(65) MG
1 TABLET ORAL
Qty: 30 | Refills: 0 | OUTPATIENT
Start: 2019-01-28 | End: 2019-02-26

## 2019-01-28 RX ORDER — ACETAMINOPHEN 500 MG
2 TABLET ORAL
Qty: 0 | Refills: 0 | DISCHARGE
Start: 2019-01-28

## 2019-01-28 RX ORDER — DEXAMETHASONE 0.5 MG/5ML
4 ELIXIR ORAL EVERY 12 HOURS
Qty: 0 | Refills: 0 | Status: DISCONTINUED | OUTPATIENT
Start: 2019-01-28 | End: 2019-01-28

## 2019-01-28 RX ORDER — CIMETIDINE 200 MG
1 TABLET ORAL
Qty: 0 | Refills: 0 | COMMUNITY

## 2019-01-28 RX ORDER — FERROUS SULFATE 325(65) MG
325 TABLET ORAL DAILY
Qty: 0 | Refills: 0 | Status: DISCONTINUED | OUTPATIENT
Start: 2019-01-28 | End: 2019-01-28

## 2019-01-28 RX ORDER — PANTOPRAZOLE SODIUM 20 MG/1
1 TABLET, DELAYED RELEASE ORAL
Qty: 30 | Refills: 0 | OUTPATIENT
Start: 2019-01-28 | End: 2019-02-26

## 2019-01-28 RX ORDER — DEXAMETHASONE 0.5 MG/5ML
1 ELIXIR ORAL
Qty: 60 | Refills: 0 | OUTPATIENT
Start: 2019-01-28 | End: 2019-02-26

## 2019-01-28 RX ADMIN — Medication 4 MILLIGRAM(S): at 17:50

## 2019-01-28 RX ADMIN — Medication 4 MILLIGRAM(S): at 06:34

## 2019-01-28 RX ADMIN — Medication 3 MILLILITER(S): at 04:19

## 2019-01-28 RX ADMIN — Medication 2: at 18:29

## 2019-01-28 RX ADMIN — Medication 1: at 00:55

## 2019-01-28 RX ADMIN — PANTOPRAZOLE SODIUM 40 MILLIGRAM(S): 20 TABLET, DELAYED RELEASE ORAL at 06:34

## 2019-01-28 RX ADMIN — Medication 4 MILLIGRAM(S): at 00:02

## 2019-01-28 RX ADMIN — Medication 4 MILLIGRAM(S): at 13:25

## 2019-01-28 RX ADMIN — Medication 325 MILLIGRAM(S): at 17:53

## 2019-01-28 RX ADMIN — Medication 3 MILLILITER(S): at 16:05

## 2019-01-28 NOTE — PROGRESS NOTE ADULT - PROBLEM SELECTOR PLAN 1
Pt w/ new productive cough in setting of extensive smoking hx. Concern for Lung CA vs RCC.   - CT chest shows large RUL mass w/ left lower lobe mass, innumerable bilateral nodular opacities, and mediastinal/bilateral hilar lymphadenopathy. Also w/ b/l adrenal metastases.   - CT A/P showing 11.1 cm necrotic mass inseparable from the lower pole of the left kidney, b/l adrenal nodules and left para-aortic lymphadenopathy.  - IR lung biopsy scheduled for Monday, NPO after midnight on Sunday   - H/O following  - MRI head showing left sided frontoparietal metastasis with associated edema, started on decadron Pt w/ new productive cough in setting of extensive smoking hx. Concern for Lung CA vs RCC.   - CT chest shows large RUL mass w/ left lower lobe mass, innumerable bilateral nodular opacities, and mediastinal/bilateral hilar lymphadenopathy. Also w/ b/l adrenal metastases.   - CT A/P showing 11.1 cm necrotic mass inseparable from the lower pole of the left kidney, b/l adrenal nodules and left para-aortic lymphadenopathy.  - IR lung biopsy scheduled for today    - H/O following  - MRI head showing left sided frontoparietal metastasis with associated edema, started on decadron, to f/u w/ rad onc after d/c

## 2019-01-28 NOTE — PROGRESS NOTE ADULT - ATTENDING COMMENTS
metastatic disease  biopsy pending today  bone scan will be ordered as well for full staging
metastatic disease  biopsy pending today  bone scan will be ordered as well for full staging
pt with radiological findings concerning for metastatic lung cancer in addition to large renal mass, which could be a second primary. Tissue dx pending. MRI showed brain mets. Rec dex, neuro surg and rad onc consult. D/w the pt and medicine team
Patient seen and examined. Agree with above note by resident.    # Lung mass - likely malignant with metastasis to adrenals/renal vs primary RCC.  Onc input appreciated. IR guided biopsy of lung mass on monday.     #Brain mass with edema - likely metastatic disease. Start decadron. Consider keppra. Radonc eval on monday.     # Hypercalcemia - suspect malignancy related. Asymptomatic. Monitor for now.     # Adrenal/renal mass - Possible RCC. Biopsy is needed for diagnosis.     # Microcytic anemia - Multifactorial. Chronic disease vs iron def. Start iron supplementation. Pt denies melena or brbpr. s/p transfusion today.      Plan discussed with pt and hs . Plan discussed with oncologist Dr. Yeny Mejia
Patient seen and examined. Agree with above note by resident.    # Lung mass - likely malignant with metastasis to adrenals. Given weight loss, h/o smoking, cough, high suspicion for lung malignancy. Onc input appreciated. IR guided biopsy of lung mass.     # Hypercalcemia - suspect malignancy related. Asymptomatic. Monitor for now.     # Adrenal/renal mass - urology evaluation for management.     # Microcytic anemia - Multifactorial. Start iron supplementation.     Plan discussed with pt and hs .
Patient seen and examined. Agree with above note by resident.    # Lung mass - likely malignant with metastasis to adrenals/renal vs primary RCC.  Onc input appreciated. IR guided biopsy of lung mass on monday.     #Brain mass with edema - likely metastatic disease. Start decadron. Radonc eval on monday.     # Hypercalcemia - suspect malignancy related. Asymptomatic. Monitor for now.     # Adrenal/renal mass - Possible RCC. Biopsy is needed for diagnosis.     # Microcytic anemia - Multifactorial. Chronic disease vs iron def. Start iron supplementation. Pt denies melena or brbpr. s/p transfusion 1/25/19 with appropriate response.     Plan discussed with pt and hs .
Patient seen and examined. Agree with above note by resident.    # Lung mass - likely malignant with metastasis to adrenals/renal vs primary RCC.  Onc input appreciated. IR guided biopsy of lung mass on monday.     #Brain mass with edema - likely metastatic disease. Start decadron. Radonc eval on monday.     # Hypercalcemia - suspect malignancy related. Asymptomatic. Monitor for now.     # Adrenal/renal mass - Possible RCC. Biopsy is needed for diagnosis.     # Microcytic anemia - Multifactorial. Chronic disease vs iron def. Start iron supplementation. Pt denies melena or brbpr. s/p transfusion 1/25/19 with appropriate response. H/h trending down again. Monitor closely. Patient reports orange colored stools.     Plan discussed with pt and hs .
68 M smoker, p/w back pain/weight loss f/w right lung mass, renal mass, and also metastatic lesion in brain s/p lung biopsy 1/28.    Patient seen after biopsy. Reports feeling well. Denies dyspnea. Mild pain at site of biopsy.    Patient is agreeable to outpatient f/u for the results of the biopsy. Denies other complaints.    If no additional recs from oncology, will be medically stable for DC with outpatient f/u with oncology for results of the biopsy.    38 min discharge time

## 2019-01-28 NOTE — PROGRESS NOTE ADULT - ASSESSMENT
Pt is a 68 year old man w/ a PMHx GERD presenting w/ progressive back pain x4 months. CT chest showing large RUL mass and LLL mass, numerous b/l nodular opacities, and mediastinal hilar lymphadenopathy concerning for malignancy. CT A/P showing 11 cm L renal mass. MRI brain showing left sided frontoparietal metastasis with associated edema, pending IR biopsy.

## 2019-01-28 NOTE — PROGRESS NOTE ADULT - PROBLEM SELECTOR PLAN 2
- CT A/P showing 11.1 cm necrotic mass inseparable from the lower pole of the left kidney, b/l adrenal nodules and left para-aortic lymphadenopathy.  - concern for RCC  - plan for IR lung biopsy on Monday - CT A/P showing 11.1 cm necrotic mass inseparable from the lower pole of the left kidney, b/l adrenal nodules and left para-aortic lymphadenopathy.  - concern for RCC  - plan for IR lung biopsy today

## 2019-01-28 NOTE — DISCHARGE NOTE ADULT - SECONDARY DIAGNOSIS.
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

## 2019-01-28 NOTE — DISCHARGE NOTE ADULT - CARE PROVIDER_API CALL
Jerman Rivers), Radiation Oncology  [address] [place] [address] A  [place] [address]  Phone: [phone]  Fax: [phone]    Samson Grewal), Urology  [address] [place] [address]  Phone: [phone]  Fax: [phone]    Yeny Mejia), HematologyOncology; [facility] Medicine; Internal Medicine  [address] [place] [address]  Phone: [phone]  Fax: [phone] Jerman Rivers), Radiation Oncology  [address] [place] [address]  Entrance A  [place] [address]  Phone: [phone]  Fax: [phone]    Samson Grewal), Urology  [address] [place] [address]  Phone: [phone]  Fax: [phone]    Yeny Mejia), HematologyOncology; [facility] Medicine; Internal Medicine  [address] [place] [address]  Phone: [phone]  Fax: [phone]    Billy Larson), Gastroenterology; Internal Medicine  [address] [place] [address]  Phone: [phone]  Fax: [phone]

## 2019-01-28 NOTE — DISCHARGE NOTE ADULT - MEDICATION SUMMARY - MEDICATIONS TO TAKE
I will START or STAY ON the medications listed below when I get home from the hospital:    dexamethasone 4 mg oral tablet  -- 1 tab(s) by mouth every 12 hours  -- Indication: For Brain mass    acetaminophen 325 mg oral tablet  -- 2 tab(s) by mouth every 6 hours, As needed, Mild Pain (1 - 3), Moderate Pain (4 - 6)  -- Indication: For Pain    ferrous sulfate 325 mg (65 mg elemental iron) oral delayed release tablet  -- 1 tab(s) by mouth once a day   -- May discolor urine or feces.  Swallow whole.  Do not crush.    -- Indication: For Anemia    pantoprazole 40 mg oral delayed release tablet  -- 1 tab(s) by mouth once a day (before a meal)  -- Indication: For GERD (gastroesophageal reflux disease)

## 2019-01-28 NOTE — CHART NOTE - NSCHARTNOTEFT_GEN_A_CORE
discussed with resident dr Hanane Jones- pt has a 6 mm probable brain met, had lung or renal biopsy today ,results pending. Assuming this is most likely metastatic renal cell CA, best treatment option for the brain lesion would be outpatient Gamma Knife radiosurgery with dr Jerman Rivers- office number for consultation appointment is ,  is Anton Lagos.    Balwinder Sullivan MD   [phone]

## 2019-01-28 NOTE — PROGRESS NOTE ADULT - PROBLEM SELECTOR PLAN 4
Ca 11.5 in setting of malignancy  - asymptomatic, does not currently require intervention  - will monitor  - PTH decreased; will f/u PTHrP  - Urology requesting bone scan however at this point would not , defer to outpatient

## 2019-01-28 NOTE — DISCHARGE NOTE ADULT - HOSPITAL COURSE
Pt is a 68 year old man w/ a PMHx GERD, smoking presenting w/ progressive back pain x4 months as well as subjective f/c, weight loss, productive cough. In the ED, VS: Temp 98, , /67, RR 16 SpO2 100%. , .6. Alk phos 321, Ca 11.5. CXR showed possible RUL PNA and multiple opacities. CT chest: RUL and LLL lesion, innumerable b/l opacities, hilar lymphadenopathy, b/l adrenal mets. Pt admitted to medical floors for further management and w/u. CT A/P showed an 11.1 cm necrotic mass inseparable from the lower pole of the left kidney; para-aortic lymphadenopathy. Pt followed by oncology team, urology team. Pt w/ decreasing H/H during hospitalization, requiring 1U pRBCs. Started on iron supplementation. Pt had brain MRI which showed left sided frontoparietal metastasis with associated edema. Pt started on decadron 4 mg q6. Neurosurgery recommended outpt SRS. Radiation oncology recommended gamma knife as an outpt. Pt underwent IR biopsy of his lung. Pt currently hemodynamically stable and medically clear for discharge home.

## 2019-01-28 NOTE — PROGRESS NOTE ADULT - PROBLEM SELECTOR PLAN 6
Microcytic. Pt has never had a colonoscopy.   - likely 2/2 chronic disease and iron deficiency  - s/p 1U pRBCs with appropriate response Pt w/ elevated BG after initiation of decadron  - ISS, will monitor FSs

## 2019-01-28 NOTE — PROGRESS NOTE ADULT - ASSESSMENT
Assessment & Plan: 68M w/ b/l lung, b/l adrenal masses, and large L renal mass.  Hx remarkable for extensive smoking history and exposure risk.  Unclear at this time whether lung masses are primary lung ca vs metastatic renal ca.  Renal mass is RCC until proven otherwise.        - Given extensive metastatic disease with brain involvement, no indication for urological intervention at this time.  - Follow up lung biopsy  - Patient to follow up with Dr. Aponte once discharged, contact [phone] for appointment.

## 2019-01-28 NOTE — DISCHARGE NOTE ADULT - MEDICATION SUMMARY - MEDICATIONS TO STOP TAKING
I will STOP taking the medications listed below when I get home from the hospital:    Tagamet 200 mg oral tablet  -- 1 tab(s) by mouth 2 times a day, As Needed

## 2019-01-28 NOTE — PROGRESS NOTE ADULT - SUBJECTIVE AND OBJECTIVE BOX
Subjective:  Patient stable this AM. Awaiting results of lung biopsy.    Objectives:  T(C): 36.5 (01-28-19 @ 14:29), Max: 36.7 (01-28-19 @ 06:09)  HR: 103 (01-28-19 @ 14:29) (81 - 103)  BP: 132/61 (01-28-19 @ 14:29) (124/72 - 132/61)  RR: 18 (01-28-19 @ 14:29) (18 - 18)  SpO2: 100% (01-28-19 @ 14:29) (98% - 100%)  Wt(kg): --      Physcial Exam  GENERAL: NAD, well-developed  ABDOMEN: Soft, Nontender, Nondistended; Bowel sounds present  PSYCH: AAOx3    LABS:                        7.9    20.26 )-----------( 507      ( 28 Jan 2019 05:50 )             28.2     01-28    138  |  103  |  21  ----------------------------<  149<H>  4.4   |  23  |  0.81    Ca    11.0<H>      28 Jan 2019 05:50  Phos  2.6     01-28  Mg     2.0     01-28    TPro  7.6  /  Alb  3.0<L>  /  TBili  0.3  /  DBili  x   /  AST  18  /  ALT  19  /  AlkPhos  217<H>  01-28    CAPILLARY BLOOD GLUCOSE      POCT Blood Glucose.: 128 mg/dL (28 Jan 2019 13:17)    PT/INR - ( 28 Jan 2019 05:50 )   PT: 13.2 SEC;   INR: 1.18          PTT - ( 28 Jan 2019 05:50 )  PTT:33.3 SEC  CAPILLARY BLOOD GLUCOSE      POCT Blood Glucose.: 128 mg/dL (28 Jan 2019 13:17)

## 2019-01-28 NOTE — DISCHARGE NOTE ADULT - CARE PLAN
Principal Discharge DX:	Lung mass  Goal:	Management; please follow up with oncology after discharge  Assessment and plan of treatment:	You came to the hospital with chronic cough and weight loss. You were found to have lung masses on CT imaging. You underwent an IR biopsy of the lung mass. Please follow up  Secondary Diagnosis:	Renal mass, left  Secondary Diagnosis:	Metastatic cancer to brain of unknown cell type  Secondary Diagnosis:	Anemia  Secondary Diagnosis:	GERD (gastroesophageal reflux disease) Principal Discharge DX:	Lung mass  Goal:	Management; please follow up with oncology after discharge  Assessment and plan of treatment:	You came to the hospital with chronic cough and weight loss. You were found to have lung masses on CT imaging. You underwent an IR biopsy of the lung mass to determine whether this was a lung cancer or metastases from the kidneys. Please follow up oncology after discharge for results of the biopsy and discussion of treatment options.  Secondary Diagnosis:	Renal mass, left  Assessment and plan of treatment:	You were found to have an 11 cm left renal mass concerning for cancer. Please follow up with urology and oncology after discharge.  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 This is likely spread from the lung or kidney. Please follow up with radiation oncology for gamma knife radiation treatment after discharge.  Secondary Diagnosis:	Anemia  Assessment and plan of treatment:	You were found to be anemic on this hospitalization. You required a blood transfusion. Please continue taking iron supplements after discharge and follow up with oncology after discharge.  Secondary Diagnosis:	GERD (gastroesophageal reflux disease)  Assessment and plan of treatment:	You endorsed gastro-esophageal reflux symptoms in the hospital. Please continue taking the protonix and follow up with a gastroenterologist after discharge. Principal Discharge DX:	Lung mass  Goal:	Management; please follow up with oncology after discharge  Assessment and plan of treatment:	You came to the hospital with chronic cough and weight loss. You were found to have lung masses on CT imaging. You underwent an IR biopsy of the lung mass to determine whether this was a lung cancer or metastases from the kidneys. Please follow up oncology after discharge for results of the biopsy and discussion of treatment options.  Secondary Diagnosis:	Renal mass, left  Assessment and plan of treatment:	You were found to have an 11 cm left renal mass concerning for cancer. Please follow up with urology and oncology after discharge.  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 This is likely spread from the lung or kidney. Please follow up with radiation oncology for gamma knife radiation treatment after discharge. Please continue to take decadron 4 mg every 12 hours until you see the radiation oncologist who will determine how to taper the steroids at that time.  Secondary Diagnosis:	Anemia  Assessment and plan of treatment:	You were found to be anemic on this hospitalization. You required a blood transfusion. Please continue taking iron supplements after discharge and follow up with oncology after discharge.  Secondary Diagnosis:	GERD (gastroesophageal reflux disease)  Assessment and plan of treatment:	You endorsed gastro-esophageal reflux symptoms in the hospital. Please continue taking the pantoprazole once daily before breakfast and follow up with a gastroenterologist after discharge.

## 2019-01-28 NOTE — DISCHARGE NOTE ADULT - PATIENT PORTAL LINK FT
You can access the [facility] Patient Portal, offered by [facility], by registering with the following website: [email or web address]

## 2019-01-28 NOTE — DISCHARGE NOTE ADULT - PLAN OF CARE
Management; please follow up with oncology after discharge You came to the hospital with chronic cough and weight loss. You were found to have lung masses on CT imaging. You underwent an IR biopsy of the lung mass. Please follow up You came to the hospital with chronic cough and weight loss. You were found to have lung masses on CT imaging. You underwent an IR biopsy of the lung mass to determine whether this was a lung cancer or metastases from the kidneys. Please follow up oncology after discharge for results of the biopsy and discussion of treatment options. You were found to have an 11 cm left renal mass concerning for cancer. Please follow up with urology and oncology after discharge. You were found to have a left sided brain mass with surrounding edema. This is likely spread from the lung or kidney. Please follow up with radiation oncology for gamma knife radiation treatment after discharge. You were found to be anemic on this hospitalization. You required a blood transfusion. Please continue taking iron supplements after discharge and follow up with oncology after discharge. You endorsed gastro-esophageal reflux symptoms in the hospital. Please continue taking the protonix and follow up with a gastroenterologist after discharge. You were found to have a left sided brain mass with surrounding edema. This is likely spread from the lung or kidney. Please follow up with radiation oncology for gamma knife radiation treatment after discharge. Please continue to take decadron 4 mg every 12 hours until you see the radiation oncologist who will determine how to taper the steroids at that time. You endorsed gastro-esophageal reflux symptoms in the hospital. Please continue taking the pantoprazole once daily before breakfast and follow up with a gastroenterologist after discharge.

## 2019-01-28 NOTE — DISCHARGE NOTE ADULT - OTHER SIGNIFICANT FINDINGS
COMPARISON: CT scan of the chest from the same day.    PROCEDURE:   CT of the Abdomen and Pelvis was performed with intravenous contrast.   Intravenous contrast: 90 ml Omnipaque 350. 10 ml discarded.  Oral contrast: None.  Sagittal and coronal reformats were performed.    FINDINGS:    LOWER CHEST: Numerous lung nodules and lung masses are again noted. There   is a necrotic appearing right paraesophageal lymph node measuring 1.5 x   1.3 cm.    LIVER: Within normal limits.  BILE DUCTS: Normal caliber.  GALLBLADDER: Contracted.   SPLEEN: Within normal limits.  PANCREAS: Within normal limits.  ADRENALS: Bilateral adrenal nodules measuring for example 2.0 cm on the   right side.  KIDNEYS/URETERS: Necrotic appearing mass inseparable from the lower pole   the left kidney measuring 10.7 x 11.1 x 8.4 cm (2, 62 and 602, 45).    BLADDER: Within normal limits.  REPRODUCTIVE ORGANS: The prostate gland and seminal vesicles appear   unremarkable.    BOWEL: No bowel obstruction.   PERITONEUM: No ascites.  VESSELS:  Within normal limits.  RETROPERITONEUM: Left paracentral aortic lymphadenopathy with a lymph   node pushing the left renal vein anteriorly measuring 2.5 x 2.3 cm (2,   45).  ABDOMINAL WALL: Small fat-containing umbilical hernia.  BONES: Calcification posteriorly at L4-5.    IMPRESSION:   Bilateral lung base nodules and masses again noted.  11.1 cm necrotic mass inseparable from the lower pole of the left kidney.  Bilateral adrenal nodules and left para-aortic lymphadenopathy.      EXAM:  CT CHEST        PROCEDURE DATE:  Jan 23 2019         INTERPRETATION:  CLINICAL INFORMATION: Intermittent back pain for 2   months, tobacco use, weight loss.    COMPARISON: Chest radiograph 1/23/2019.    PROCEDURE:   CT of the Chest was performed without intravenous contrast.  Sagittal and coronal reformats were performed.    FINDINGS:    LUNGS AND LARGE AIRWAYS: Debris/secretions within the trachea/left main   bronchus. Masslike/consolidative right upper lobe opacity with   narrowing/occlusion of the anterior/posterior segmental bronchi of the   right upper lobe, measuring approximately 6.9 x 7.3 cm on series 2, image   42. Many additional masses and nodular opacities throughout the lungs   bilaterally. For reference, a left lower lobe mass measures 4.1 x 3.4 cm   (2:87), and a right lower lobe nodule measures 1.5 cm (2:87).  PLEURA: No pleural effusion.  VESSELS: Mild atherosclerotic changes. Left three-vessel arch.  HEART: Heart size is normal. No pericardial effusion.  MEDIASTINUM AND CASEY: Mediastinal and bilateral hilar lymphadenopathy.   For reference, a right paratracheal node measures 2.7 x 1.7 cm (2:28).  CHEST WALL AND LOWER NECK: Within normal limits.  VISUALIZED UPPER ABDOMEN: 2.0 cm right and 1.8 cm left adrenal metastases.  BONES: Mild degenerative changes.    IMPRESSION:   A large right upper lobe mass with additional left lower lobe mass,   innumerable bilateral nodular opacities, and mediastinal/bilateral hilar   lymphadenopathy.  Bilateral adrenal metastases. COMPARISON: CT scan of the chest from the same day.  PROCEDURE:   CT of the Abdomen and Pelvis was performed with intravenous contrast.   Intravenous contrast: 90 ml Omnipaque 350. 10 ml discarded.  Oral contrast: None.  Sagittal and coronal reformats were performed.  FINDINGS:  LOWER CHEST: Numerous lung nodules and lung masses are again noted. There   is a necrotic appearing right paraesophageal lymph node measuring 1.5 x   1.3 cm.  LIVER: Within normal limits.  BILE DUCTS: Normal caliber.  GALLBLADDER: Contracted.   SPLEEN: Within normal limits.  PANCREAS: Within normal limits.  ADRENALS: Bilateral adrenal nodules measuring for example 2.0 cm on the   right side.  KIDNEYS/URETERS: Necrotic appearing mass inseparable from the lower pole   the left kidney measuring 10.7 x 11.1 x 8.4 cm (2, 62 and 602, 45).  BLADDER: Within normal limits.  REPRODUCTIVE ORGANS: The prostate gland and seminal vesicles appear   unremarkable.  BOWEL: No bowel obstruction.   PERITONEUM: No ascites.  VESSELS:  Within normal limits.  RETROPERITONEUM: Left paracentral aortic lymphadenopathy with a lymph   node pushing the left renal vein anteriorly measuring 2.5 x 2.3 cm (2,   45).  ABDOMINAL WALL: Small fat-containing umbilical hernia.  BONES: Calcification posteriorly at L4-5.  IMPRESSION:   Bilateral lung base nodules and masses again noted.  11.1 cm necrotic mass inseparable from the lower pole of the left kidney.  Bilateral adrenal nodules and left para-aortic lymphadenopathy.      EXAM:  CT CHEST    PROCEDURE DATE:  Jan 23 2019   INTERPRETATION:  CLINICAL INFORMATION: Intermittent back pain for 2   months, tobacco use, weight loss.  COMPARISON: Chest radiograph 1/23/2019.  PROCEDURE:   CT of the Chest was performed without intravenous contrast.  Sagittal and coronal reformats were performed.  FINDINGS:  LUNGS AND LARGE AIRWAYS: Debris/secretions within the trachea/left main   bronchus. Masslike/consolidative right upper lobe opacity with   narrowing/occlusion of the anterior/posterior segmental bronchi of the   right upper lobe, measuring approximately 6.9 x 7.3 cm on series 2, image   42. Many additional masses and nodular opacities throughout the lungs   bilaterally. For reference, a left lower lobe mass measures 4.1 x 3.4 cm   (2:87), and a right lower lobe nodule measures 1.5 cm (2:87).  PLEURA: No pleural effusion.  VESSELS: Mild atherosclerotic changes. Left three-vessel arch.  HEART: Heart size is normal. No pericardial effusion.  MEDIASTINUM AND CASEY: Mediastinal and bilateral hilar lymphadenopathy.   For reference, a right paratracheal node measures 2.7 x 1.7 cm (2:28).  CHEST WALL AND LOWER NECK: Within normal limits.  VISUALIZED UPPER ABDOMEN: 2.0 cm right and 1.8 cm left adrenal metastases.  BONES: Mild degenerative changes.  IMPRESSION:   A large right upper lobe mass with additional left lower lobe mass,   innumerable bilateral nodular opacities, and mediastinal/bilateral hilar   lymphadenopathy.  Bilateral adrenal metastases.      EXAM:  MR BRAIN WAW IC    PROCEDURE DATE:  Jan 24 2019   INTERPRETATION:  INDICATIONS:  History of lung mass, pulmonary nodules   and presumed adrenal metastases  TECHNIQUE:  Multiplanar imaging was performed using T1 weighted, T2   weighted and FLAIR sequences.  Diffusion weighted and SWI images were   also obtained.  Following intravenous gadolinium, multiplanar T1 weighted   images were performed. 7.5 cc Gadavist were administered. 0 cc were   discarded.    COMPARISON EXAMINATION:  None.    FINDINGS:  Susceptibility artifact is seen arising from the left temporal   region. Portions of this region are obscured by artifact.  VENTRICLES AND SULCI:  Normal.    INTRA-AXIAL:  There is a 6 x 5 x 6 mm enhancing nodule within the centrum   semiovale of the frontoparietal region on the left with surrounding   vasogenic edema. No other areas of abnormal enhancement are seen. Patchy   areas of white matter T2 hyperintensity are seen compatible with moderate   microvascular-type changes. No diffusion restriction is seen to suggest   acute ischemia.  EXTRA-AXIAL:  No mass or collection.  VISUALIZED SINUSES:  Normal.  VISUALIZED MASTOIDS:  Clear.  CALVARIUM:  Normal.  IMPRESSION:    Left sided frontoparietal metastasis with associated edema.  Microvascular-type changes.

## 2019-01-28 NOTE — PROGRESS NOTE ADULT - PROVIDER SPECIALTY LIST ADULT
Heme/Onc
Internal Medicine
Urology
Internal Medicine

## 2019-01-28 NOTE — DISCHARGE NOTE ADULT - ADDITIONAL INSTRUCTIONS
Please follow up with oncology after discharge.  Please follow up with radiation oncology after discharge.  Please follow up with urology after discharge. Please follow up with oncology after discharge.  Please follow up with radiation oncology after discharge.  Please follow up with urology after discharge.  Please follow up with gastroenterology after discharge.

## 2019-01-28 NOTE — PROGRESS NOTE ADULT - PROBLEM SELECTOR PLAN 3
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  - pt started on decadron  - NS consulted, recommending ouptatient SRS  - will c/s rad onc on monday MRI brain showing left sided frontoparietal metastasis with associated edema.  - pt started on decadron  - NS consulted, recommending oupatient SRS  - Rad onc consulted, pt to f/u w/ Dr. Rivers for gamma knife

## 2019-01-28 NOTE — PROGRESS NOTE ADULT - PROBLEM SELECTOR PLAN 7
Improve 2 but holding Lovenox in setting of brain mets  Diet: regular  Dispo: pending Microcytic. Currently stable  - likely 2/2 chronic disease and iron deficiency  - s/p 1U pRBCs 1/25 with appropriate response   - will start iron supplementation

## 2019-01-28 NOTE — PROGRESS NOTE ADULT - SUBJECTIVE AND OBJECTIVE BOX
Hanane Jones MD PGY1     Pager [phone]/ [phone]    For Night coverage 7pm-7am: NS- page 1443 Team1-3, page 1446 Team4 & Care Model  Sat/[place] Cross Coverage 12pm-7pm: NS- page 1443 for Team1-4, [facility]- pager forwarded to covering Resident      PERIFINNISIDORO MOCTEZUMA  68y  Male    Subjective: No events overnight.        T(C): 36.7 (01-28-19 @ 06:09), Max: 36.9 (01-27-19 @ 22:33)  HR: 94 (01-28-19 @ 06:09) (81 - 100)  BP: 124/72 (01-28-19 @ 06:09) (122/62 - 133/70)  RR: 18 (01-28-19 @ 06:09) (17 - 18)  SpO2: 98% (01-28-19 @ 06:09) (98% - 100%)  Wt(kg): --Vital Signs Last 24 Hrs  T(C): 36.7 (28 Jan 2019 06:09), Max: 36.9 (27 Jan 2019 22:33)  T(F): 98 (28 Jan 2019 06:09), Max: 98.5 (27 Jan 2019 22:33)  HR: 94 (28 Jan 2019 06:09) (81 - 100)  BP: 124/72 (28 Jan 2019 06:09) (122/62 - 133/70)  BP(mean): --  RR: 18 (28 Jan 2019 06:09) (17 - 18)  SpO2: 98% (28 Jan 2019 06:09) (98% - 100%)    PHYSICAL EXAM:  GENERAL: NAD, well-groomed, well-developed  HEAD:  Atraumatic, Normocephalic  EYES: EOMI, PERRLA, conjunctiva and sclera clear  ENMT: No tonsillar erythema, exudates, or enlargement; Moist mucous membranes, Good dentition, No lesions  NECK: Supple, No JVD, Normal thyroid  NERVOUS SYSTEM:  Alert & Oriented X3, Good concentration; Motor Strength 5/5 B/L upper and lower extremities; DTRs 2+ intact and symmetric  CHEST/LUNG: Clear to percussion bilaterally; No rales, rhonchi, wheezing, or rubs  HEART: Regular rate and rhythm; No murmurs, rubs, or gallops  ABDOMEN: Soft, Nontender, Nondistended; Bowel sounds present  EXTREMITIES:  2+ Peripheral Pulses, No clubbing, cyanosis, or edema  LYMPH: No lymphadenopathy noted  SKIN: No rashes or lesions    Consultant(s) Notes Reviewed:  [x ] YES  [ ] NO  Care Discussed with Consultants/Other Providers [ x] YES  [ ] NO    LABS:                        7.4    21.04 )-----------( 522      ( 27 Jan 2019 07:20 )             26.8     01-28    138  |  103  |  21  ----------------------------<  149<H>  4.4   |  23  |  0.81    Ca    11.0<H>      28 Jan 2019 05:50  Phos  2.6     01-28  Mg     2.0     01-28    TPro  7.6  /  Alb  3.0<L>  /  TBili  0.3  /  DBili  x   /  AST  18  /  ALT  19  /  AlkPhos  217<H>  01-28    PT/INR - ( 28 Jan 2019 05:50 )   PT: 13.2 SEC;   INR: 1.18          PTT - ( 28 Jan 2019 05:50 )  PTT:33.3 SEC          RADIOLOGY, EKG & ADDITIONAL TESTS: Reviewed.       RADIOLOGY & ADDITIONAL TESTS:    Imaging Personally Reviewed:  [ ] YES  [ ] NO  MedsMEDICATIONS  (STANDING):  ALBUTerol/ipratropium for Nebulization 3 milliLiter(s) Nebulizer every 6 hours  dexamethasone     Tablet 4 milliGRAM(s) Oral every 6 hours  dextrose 5%. 1000 milliLiter(s) (50 mL/Hr) IV Continuous <Continuous>  dextrose 50% Injectable 12.5 Gram(s) IV Push once  dextrose 50% Injectable 25 Gram(s) IV Push once  dextrose 50% Injectable 25 Gram(s) IV Push once  insulin lispro (HumaLOG) corrective regimen sliding scale   SubCutaneous three times a day before meals  insulin lispro (HumaLOG) corrective regimen sliding scale   SubCutaneous at bedtime  pantoprazole    Tablet 40 milliGRAM(s) Oral before breakfast    MEDICATIONS  (PRN):  acetaminophen   Tablet .. 650 milliGRAM(s) Oral every 6 hours PRN Mild Pain (1 - 3), Moderate Pain (4 - 6)  dextrose 40% Gel 15 Gram(s) Oral once PRN Blood Glucose LESS THAN 70 milliGRAM(s)/deciliter  glucagon  Injectable 1 milliGRAM(s) IntraMuscular once PRN Glucose LESS THAN 70 milligrams/deciliter Hanane Jones MD PGY1     Pager [phone]/ [phone]    For Night coverage 7pm-7am: NS- page 1443 Team1-3, page 1446 Team4 & Care Model  Sat/[place] Cross Coverage 12pm-7pm: NS- page 1443 for Team1-4, [facility]- pager forwarded to covering Resident      CARIDAD SHOOKMAL  68y  Male    Subjective: No events overnight. Pt denies cp, sob, f/c, n/v. Back pain well-controlled. Plan for IR biopsy today.       T(C): 36.7 (01-28-19 @ 06:09), Max: 36.9 (01-27-19 @ 22:33)  HR: 94 (01-28-19 @ 06:09) (81 - 100)  BP: 124/72 (01-28-19 @ 06:09) (122/62 - 133/70)  RR: 18 (01-28-19 @ 06:09) (17 - 18)  SpO2: 98% (01-28-19 @ 06:09) (98% - 100%)  Wt(kg): --Vital Signs Last 24 Hrs  T(C): 36.7 (28 Jan 2019 06:09), Max: 36.9 (27 Jan 2019 22:33)  T(F): 98 (28 Jan 2019 06:09), Max: 98.5 (27 Jan 2019 22:33)  HR: 94 (28 Jan 2019 06:09) (81 - 100)  BP: 124/72 (28 Jan 2019 06:09) (122/62 - 133/70)  BP(mean): --  RR: 18 (28 Jan 2019 06:09) (17 - 18)  SpO2: 98% (28 Jan 2019 06:09) (98% - 100%)    PHYSICAL EXAM:  GENERAL: NAD, well-groomed, well-developed  HEAD:  Atraumatic, Normocephalic  EYES: EOMI, PERRLA, conjunctiva and sclera clear  ENMT: No tonsillar erythema, exudates, or enlargement; Moist mucous membranes, Good dentition, No lesions  NECK: Supple, No JVD, Normal thyroid  NERVOUS SYSTEM:  Alert & Oriented X3, Good concentration; Motor Strength 5/5 B/L upper and lower extremities   CHEST/LUNG: Clear to percussion bilaterally; No rales, rhonchi, wheezing, or rubs  HEART: Regular rate and rhythm; No murmurs, rubs, or gallops  ABDOMEN: Soft, Nontender, Nondistended; Bowel sounds present; LUQ mass palpated  EXTREMITIES:  2+ Peripheral Pulses, No clubbing, cyanosis, or edema  LYMPH: No lymphadenopathy noted  SKIN: No rashes or lesions    Consultant(s) Notes Reviewed:  [x ] YES  [ ] NO  Care Discussed with Consultants/Other Providers [ x] YES  [ ] NO    LABS:                        7.4    21.04 )-----------( 522      ( 27 Jan 2019 07:20 )             26.8     01-28    138  |  103  |  21  ----------------------------<  149<H>  4.4   |  23  |  0.81    Ca    11.0<H>      28 Jan 2019 05:50  Phos  2.6     01-28  Mg     2.0     01-28    TPro  7.6  /  Alb  3.0<L>  /  TBili  0.3  /  DBili  x   /  AST  18  /  ALT  19  /  AlkPhos  217<H>  01-28    PT/INR - ( 28 Jan 2019 05:50 )   PT: 13.2 SEC;   INR: 1.18          PTT - ( 28 Jan 2019 05:50 )  PTT:33.3 SEC          RADIOLOGY, EKG & ADDITIONAL TESTS: Reviewed.       RADIOLOGY & ADDITIONAL TESTS:    Imaging Personally Reviewed:  [ ] YES  [ ] NO  MedsMEDICATIONS  (STANDING):  ALBUTerol/ipratropium for Nebulization 3 milliLiter(s) Nebulizer every 6 hours  dexamethasone     Tablet 4 milliGRAM(s) Oral every 6 hours  dextrose 5%. 1000 milliLiter(s) (50 mL/Hr) IV Continuous <Continuous>  dextrose 50% Injectable 12.5 Gram(s) IV Push once  dextrose 50% Injectable 25 Gram(s) IV Push once  dextrose 50% Injectable 25 Gram(s) IV Push once  insulin lispro (HumaLOG) corrective regimen sliding scale   SubCutaneous three times a day before meals  insulin lispro (HumaLOG) corrective regimen sliding scale   SubCutaneous at bedtime  pantoprazole    Tablet 40 milliGRAM(s) Oral before breakfast    MEDICATIONS  (PRN):  acetaminophen   Tablet .. 650 milliGRAM(s) Oral every 6 hours PRN Mild Pain (1 - 3), Moderate Pain (4 - 6)  dextrose 40% Gel 15 Gram(s) Oral once PRN Blood Glucose LESS THAN 70 milliGRAM(s)/deciliter  glucagon  Injectable 1 milliGRAM(s) IntraMuscular once PRN Glucose LESS THAN 70 milligrams/deciliter

## 2019-01-28 NOTE — DISCHARGE NOTE ADULT - CARE PROVIDERS DIRECT ADDRESSES
,[email or web address] [email or web address]

## 2019-01-28 NOTE — DISCHARGE NOTE ADULT - PROVIDER TOKENS
TOKEN:'[id number]',TOKEN:'[id number]',TOKEN:'[id number]' TOKEN:'[id number]:'[id number]:'[id number]',TOKEN:'[id number]'

## 2019-01-30 LAB — PTH RELATED PROT SERPL-MCNC: <2 — SIGNIFICANT CHANGE UP

## 2019-01-30 NOTE — CHART NOTE - NSCHARTNOTEFT_GEN_A_CORE
Oncology Fellow Note    Lung biopsy pathology returned for patient, positive for metastatic renal cell carcinoma.   Results were communicated via telephone to patient, [phone].    He was informed that referral will be made to [facility] for follow-up.       Olivia Powell MD  Hematology/Oncology Fellow, PGY-4  pager: [phone]  After 5pm or on weekends, please page the on-call fellow.

## 2019-01-31 ENCOUNTER — OUTPATIENT (OUTPATIENT)
Dept: OUTPATIENT SERVICES | Facility: HOSPITAL | Age: 69
LOS: 1 days | Discharge: HOME | End: 2019-01-31

## 2019-01-31 DIAGNOSIS — C64.9 MALIGNANT NEOPLASM OF UNSPECIFIED KIDNEY, EXCEPT RENAL PELVIS: ICD-10-CM

## 2019-01-31 DIAGNOSIS — Z90.49 ACQUIRED ABSENCE OF OTHER SPECIFIED PARTS OF DIGESTIVE TRACT: Chronic | ICD-10-CM

## 2019-02-01 ENCOUNTER — RESULT REVIEW (OUTPATIENT)
Age: 69
End: 2019-02-01

## 2019-02-01 ENCOUNTER — APPOINTMENT (OUTPATIENT)
Dept: HEMATOLOGY ONCOLOGY | Facility: CLINIC | Age: 69
End: 2019-02-01
Payer: COMMERCIAL

## 2019-02-01 VITALS
TEMPERATURE: 97.9 F | HEIGHT: 72.44 IN | WEIGHT: 178.57 LBS | HEART RATE: 107 BPM | SYSTOLIC BLOOD PRESSURE: 156 MMHG | OXYGEN SATURATION: 100 % | RESPIRATION RATE: 17 BRPM | BODY MASS INDEX: 23.92 KG/M2 | DIASTOLIC BLOOD PRESSURE: 89 MMHG

## 2019-02-01 DIAGNOSIS — F17.210 NICOTINE DEPENDENCE, CIGARETTES, UNCOMPLICATED: ICD-10-CM

## 2019-02-01 DIAGNOSIS — Z80.9 FAMILY HISTORY OF MALIGNANT NEOPLASM, UNSPECIFIED: ICD-10-CM

## 2019-02-01 DIAGNOSIS — Z78.9 OTHER SPECIFIED HEALTH STATUS: ICD-10-CM

## 2019-02-01 LAB
BASOPHILS # BLD AUTO: 0 K/UL — SIGNIFICANT CHANGE UP (ref 0–0.2)
BASOPHILS NFR BLD AUTO: 0.1 % — SIGNIFICANT CHANGE UP (ref 0–2)
EOSINOPHIL # BLD AUTO: 0 K/UL — SIGNIFICANT CHANGE UP (ref 0–0.5)
EOSINOPHIL NFR BLD AUTO: 0.2 % — SIGNIFICANT CHANGE UP (ref 0–6)
HCT VFR BLD CALC: 31.4 % — LOW (ref 39–50)
HGB BLD-MCNC: 9 G/DL — LOW (ref 13–17)
LYMPHOCYTES # BLD AUTO: 1.9 K/UL — SIGNIFICANT CHANGE UP (ref 1–3.3)
LYMPHOCYTES # BLD AUTO: 11.1 % — LOW (ref 13–44)
MCHC RBC-ENTMCNC: 21.8 PG — LOW (ref 27–34)
MCHC RBC-ENTMCNC: 28.8 G/DL — LOW (ref 32–36)
MCV RBC AUTO: 75.8 FL — LOW (ref 80–100)
MONOCYTES # BLD AUTO: 1.1 K/UL — HIGH (ref 0–0.9)
MONOCYTES NFR BLD AUTO: 6.1 % — SIGNIFICANT CHANGE UP (ref 2–14)
NEUTROPHILS # BLD AUTO: 14.4 K/UL — HIGH (ref 1.8–7.4)
NEUTROPHILS NFR BLD AUTO: 82.6 % — HIGH (ref 43–77)
PLATELET # BLD AUTO: 444 K/UL — HIGH (ref 150–400)
RBC # BLD: 4.14 M/UL — LOW (ref 4.2–5.8)
RBC # FLD: 17.9 % — HIGH (ref 10.3–14.5)
WBC # BLD: 17.4 K/UL — HIGH (ref 3.8–10.5)
WBC # FLD AUTO: 17.4 K/UL — HIGH (ref 3.8–10.5)

## 2019-02-01 PROCEDURE — 99205 OFFICE O/P NEW HI 60 MIN: CPT

## 2019-02-02 NOTE — ASSESSMENT
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 Based on IMDC risk prediction he has leukocytosis, anemia, thrombocytosis and hypercalcemia, time to initiated treatment less than one year, 5/6 factors. He has poor risk mccRCC.\par \par I had a lengthy discussion with the patient regarding his cancer status and further management. He has stage IV mccRCC, poor risk. The management is palliative not curative. He needs whole body bone scan to complete staging work up.  The standard of care is combined immunotherapy nivo and ipi based on CHECKMATE 214 trial with significant survival benefit as compared to sunitinib group. I also mentioned to the patient cabozantinib based on [place] trial with PFS and CARTY benefit as compared with sunitinib. I am planning to start him on combined immunotherapy. The potential AEs are including but not limited to skin rashes, arthralgia, myositis, colitis, hepatitis, nephritis, pneumonitis, thyroiditis, hypophysitis. He needs to follow up with Dr. Rivers for his brain mets for Gamma Knife treatment, continues dexamethasone. His many questions were answered in full to his satisfaction.

## 2019-02-02 NOTE — HISTORY OF PRESENT ILLNESS
[Disease: _____________________] : Disease: [unfilled] [T: ___] : T[unfilled] [N: ___] : N[unfilled] [M: ___] : M[unfilled] [AJCC Stage: ____] : AJCC Stage: [unfilled] [de-identified] : Pt is a 68 year old man w/ a PMHx GERD, smoking presenting w/ progressive back pain x4 months, subjective fever and cough/sputum, shortness of breath, was hospitalized to [facility]. He states that the back pain is dull, intermittent, and located inferior to his right scapula, and occasional radiated to the side of his chest. Pt states that the pain has gotten worse over the past few months, and it worsens with movement. It frequently awakens him from sleep, but improves with two Advil. Pt also endorses two months of an intermittent cough productive of whitish sputum. Pt also states 30lbs weight loss in last 6 months. He becomes sob on exertion. Pt states he also has a decreased appetite. He also has occasional subjective fevers/chills.  In ED, he was found to have  Alk phos 321, Ca 11.5. CXR showed possible RUL PNA and multiple opacities. CT chest: RUL and LLL lesion, innumerable b/l opacities, hilar lymphadenopathy, b/l adrenal mets. Pt admitted to medical floors for further management and w/u. CT A/P showed an 11.1 cm necrotic mass inseparable from the lower pole of the left kidney; para-aortic lymphadenopathy. Pt followed by oncology team, urology team. Pt w/ decreasing H/H during hospitalization, requiring 1U pRBCs. Started on iron supplementation. Pt had brain MRI which showed left sided frontoparietal metastasis with associated edema. Pt started on decadron 4 mg q6. Neurosurgery recommended outpt SRS. Radiation oncology recommended gamma knife as an outpt. Pt underwent IR biopsy of his lung. \par  [de-identified] : clear cell renal cell carcinoma

## 2019-02-02 NOTE — REVIEW OF SYSTEMS
[Recent Change In Weight] : ~T recent weight change [Cough] : cough [Abdominal Pain] : abdominal pain [Joint Pain] : joint pain [Negative] : Allergic/Immunologic [Fever] : no fever [Fatigue] : no fatigue [Shortness Of Breath] : no shortness of breath [Wheezing] : no wheezing [SOB on Exertion] : no shortness of breath during exertion [Vomiting] : no vomiting [Constipation] : no constipation [Diarrhea] : no diarrhea [FreeTextEntry2] : 30lbs weight loss in last 6 months [FreeTextEntry7] : LUQ discomfort [FreeTextEntry9] : right scapula pain

## 2019-02-02 NOTE — REASON FOR VISIT
[Initial Consultation] : an initial consultation [FreeTextEntry2] : [facility]

## 2019-02-05 ENCOUNTER — APPOINTMENT (OUTPATIENT)
Dept: RADIATION ONCOLOGY | Facility: CLINIC | Age: 69
End: 2019-02-05
Payer: COMMERCIAL

## 2019-02-05 ENCOUNTER — OUTPATIENT (OUTPATIENT)
Dept: OUTPATIENT SERVICES | Facility: HOSPITAL | Age: 69
LOS: 1 days | Discharge: HOME | End: 2019-02-05

## 2019-02-05 VITALS
BODY MASS INDEX: 24.16 KG/M2 | DIASTOLIC BLOOD PRESSURE: 80 MMHG | TEMPERATURE: 99 F | RESPIRATION RATE: 15 BRPM | OXYGEN SATURATION: 100 % | SYSTOLIC BLOOD PRESSURE: 162 MMHG | HEART RATE: 111 BPM | HEIGHT: 72.28 IN | WEIGHT: 180.34 LBS

## 2019-02-05 DIAGNOSIS — Z90.49 ACQUIRED ABSENCE OF OTHER SPECIFIED PARTS OF DIGESTIVE TRACT: Chronic | ICD-10-CM

## 2019-02-05 PROCEDURE — 99205 OFFICE O/P NEW HI 60 MIN: CPT | Mod: GC,25

## 2019-02-05 NOTE — VITALS
[Maximal Pain Intensity: 0/10] : 0/10 [Least Pain Intensity: 0/10] : 0/10 [90: Able to carry normal activity; minor signs or symptoms of disease.] : 90: Able to carry normal activity; minor signs or symptoms of disease.  [Date: ____________] : Patient's last distress assessment performed on [unfilled]. [2 - Distress Level] : Distress Level: 2

## 2019-02-06 ENCOUNTER — APPOINTMENT (OUTPATIENT)
Dept: INFUSION THERAPY | Facility: HOSPITAL | Age: 69
End: 2019-02-06

## 2019-02-06 ENCOUNTER — LABORATORY RESULT (OUTPATIENT)
Age: 69
End: 2019-02-06

## 2019-02-06 ENCOUNTER — OUTPATIENT (OUTPATIENT)
Dept: OUTPATIENT SERVICES | Facility: HOSPITAL | Age: 69
LOS: 1 days | Discharge: HOME | End: 2019-02-06
Payer: COMMERCIAL

## 2019-02-06 ENCOUNTER — RESULT REVIEW (OUTPATIENT)
Age: 69
End: 2019-02-06

## 2019-02-06 DIAGNOSIS — Z90.49 ACQUIRED ABSENCE OF OTHER SPECIFIED PARTS OF DIGESTIVE TRACT: Chronic | ICD-10-CM

## 2019-02-06 LAB
ANISOCYTOSIS BLD QL: SLIGHT — SIGNIFICANT CHANGE UP
BASOPHILS # BLD AUTO: 0 K/UL — SIGNIFICANT CHANGE UP (ref 0–0.2)
EOSINOPHIL # BLD AUTO: 0.1 K/UL — SIGNIFICANT CHANGE UP (ref 0–0.5)
HCT VFR BLD CALC: 31.2 % — LOW (ref 39–50)
HGB BLD-MCNC: 9.4 G/DL — LOW (ref 13–17)
HYPOCHROMIA BLD QL: SLIGHT — SIGNIFICANT CHANGE UP
LYMPHOCYTES # BLD AUTO: 1.6 K/UL — SIGNIFICANT CHANGE UP (ref 1–3.3)
LYMPHOCYTES # BLD AUTO: 10 % — LOW (ref 13–44)
MACROCYTES BLD QL: SLIGHT — SIGNIFICANT CHANGE UP
MCHC RBC-ENTMCNC: 22.6 PG — LOW (ref 27–34)
MCHC RBC-ENTMCNC: 30 G/DL — LOW (ref 32–36)
MCV RBC AUTO: 75.4 FL — LOW (ref 80–100)
MICROCYTES BLD QL: SLIGHT — SIGNIFICANT CHANGE UP
MONOCYTES # BLD AUTO: 1.3 K/UL — HIGH (ref 0–0.9)
MONOCYTES NFR BLD AUTO: 3 % — SIGNIFICANT CHANGE UP (ref 2–14)
NEUTROPHILS # BLD AUTO: 18.9 K/UL — HIGH (ref 1.8–7.4)
NEUTROPHILS NFR BLD AUTO: 86 % — HIGH (ref 43–77)
NEUTS BAND # BLD: 1 % — SIGNIFICANT CHANGE UP (ref 0–8)
PLAT MORPH BLD: NORMAL — SIGNIFICANT CHANGE UP
PLATELET # BLD AUTO: 365 K/UL — SIGNIFICANT CHANGE UP (ref 150–400)
POIKILOCYTOSIS BLD QL AUTO: SLIGHT — SIGNIFICANT CHANGE UP
POLYCHROMASIA BLD QL SMEAR: SLIGHT — SIGNIFICANT CHANGE UP
RBC # BLD: 4.14 M/UL — LOW (ref 4.2–5.8)
RBC # FLD: 18.7 % — HIGH (ref 10.3–14.5)
RBC BLD AUTO: ABNORMAL
TARGETS BLD QL SMEAR: SLIGHT — SIGNIFICANT CHANGE UP
WBC # BLD: 21.9 K/UL — HIGH (ref 3.8–10.5)
WBC # FLD AUTO: 21.9 K/UL — HIGH (ref 3.8–10.5)

## 2019-02-06 NOTE — PHYSICAL EXAM
[General Appearance - Well Developed] : well developed [Thin] : thin [] : no respiratory distress [Heart Sounds] : normal S1 and S2 [Abdomen Soft] : soft [Normal] : oriented to person, place and time, the affect was normal, the mood was normal and not anxious [Oriented To Time, Place, And Person] : oriented to person, place, and time [de-identified] : CN II-XII normal

## 2019-02-06 NOTE — HISTORY OF PRESENT ILLNESS
[FreeTextEntry1] : Mr. Atkinson is here today to discuss the management of a recently diagnosed brain metastases.  \par \par He is a 69 y/o gentleman with a new diagnosis of metastatic renal cell carcinoma with innumerable bilateral opacities, hilar lymphadenopathy, bilateral adrenal mets, and an 11cm necrotic mass inseparable from the lower pole of the left kidney.  His diagnoses was made during hospitalization for a chief complaint of right sided shoulder and back pain in addition to coughing. \par \par  A brain MRI from 1/25/19 shows a single 6 x 5 x 6mm enhancing nodule within the centrum semiovale of the frontoparietal region on the left with surrounding vasogenic edema.  Seen as an inpatient and recommended for outpatient consultation for Gamma Knife SRS.  He was placed on Decadron 4mg Q6H.  Met with Dr. Harding as an inpatient.\par \par He met with Dr. Pedroza on 2/1/19 and will be receiving combination checkpoint inhibition with nivolumab and ipilimumab.\par \par Today in clinic he remains on dexamethasone 4mg BID. Notes shoulder pain has decreased. Denies headaches, blurry vision, confusion, trouble with balance, nausea, dropping things, feeling clumsy, numbness, or tingling. He notes bilateral legs have been a bit weak since leaving the hospital.

## 2019-02-06 NOTE — LETTER CLOSING
[Consult Closing:] : Thank you for allowing me to participate in the care of this patient.  If you have any questions, please do not hesitate to contact me. [Sincerely yours,] : Sincerely yours, [FreeTextEntry3] : Jerman Rivers MD\par Attending Physician\par Department of Radiation Medicine\par \par \par

## 2019-02-06 NOTE — REVIEW OF SYSTEMS
[Cough] : cough [Negative] : Heme/Lymph [Patient Intake Form Reviewed] : Patient intake form was reviewed [Confused] : no confusion [Dizziness] : no dizziness [Fainting] : no fainting [Difficulty Walking] : no difficulty walking [FreeTextEntry9] : bilateral EL weakness

## 2019-02-06 NOTE — DATA REVIEWED
[FreeTextEntry1] : MRI 1/24 - Left sided frontoparietal metastasis with associated edema.\par \par I have personally reviewed the MRI.

## 2019-02-07 ENCOUNTER — APPOINTMENT (OUTPATIENT)
Dept: RADIATION ONCOLOGY | Facility: CLINIC | Age: 69
End: 2019-02-07

## 2019-02-07 DIAGNOSIS — Z51.11 ENCOUNTER FOR ANTINEOPLASTIC CHEMOTHERAPY: ICD-10-CM

## 2019-02-13 ENCOUNTER — FORM ENCOUNTER (OUTPATIENT)
Age: 69
End: 2019-02-13

## 2019-02-14 ENCOUNTER — OUTPATIENT (OUTPATIENT)
Dept: OUTPATIENT SERVICES | Facility: HOSPITAL | Age: 69
LOS: 1 days | End: 2019-02-14
Payer: COMMERCIAL

## 2019-02-14 ENCOUNTER — APPOINTMENT (OUTPATIENT)
Dept: NEUROSURGERY | Facility: AMBULATORY SURGERY CENTER | Age: 69
End: 2019-02-14
Payer: COMMERCIAL

## 2019-02-14 ENCOUNTER — APPOINTMENT (OUTPATIENT)
Dept: MRI IMAGING | Facility: IMAGING CENTER | Age: 69
End: 2019-02-14

## 2019-02-14 DIAGNOSIS — C79.31 SECONDARY MALIGNANT NEOPLASM OF BRAIN: ICD-10-CM

## 2019-02-14 DIAGNOSIS — Z90.49 ACQUIRED ABSENCE OF OTHER SPECIFIED PARTS OF DIGESTIVE TRACT: Chronic | ICD-10-CM

## 2019-02-14 PROCEDURE — 77334 RADIATION TREATMENT AID(S): CPT | Mod: 26

## 2019-02-14 PROCEDURE — 76498 UNLISTED MR PROCEDURE: CPT

## 2019-02-14 PROCEDURE — 77263 THER RADIOLOGY TX PLNG CPLX: CPT

## 2019-02-14 PROCEDURE — 77295 3-D RADIOTHERAPY PLAN: CPT | Mod: 26

## 2019-02-14 PROCEDURE — 61796 SRS CRANIAL LESION SIMPLE: CPT

## 2019-02-14 PROCEDURE — 77300 RADIATION THERAPY DOSE PLAN: CPT | Mod: 26

## 2019-02-14 PROCEDURE — 61800 APPLY SRS HEADFRAME ADD-ON: CPT

## 2019-02-14 PROCEDURE — 77432 STEREOTACTIC RADIATION TRMT: CPT

## 2019-02-20 ENCOUNTER — CHART COPY (OUTPATIENT)
Age: 69
End: 2019-02-20

## 2019-02-25 ENCOUNTER — APPOINTMENT (OUTPATIENT)
Dept: HEMATOLOGY ONCOLOGY | Facility: CLINIC | Age: 69
End: 2019-02-25
Payer: COMMERCIAL

## 2019-02-25 VITALS
TEMPERATURE: 97.5 F | HEART RATE: 94 BPM | WEIGHT: 180.03 LBS | DIASTOLIC BLOOD PRESSURE: 71 MMHG | BODY MASS INDEX: 24.23 KG/M2 | OXYGEN SATURATION: 96 % | SYSTOLIC BLOOD PRESSURE: 146 MMHG | RESPIRATION RATE: 16 BRPM

## 2019-02-25 PROCEDURE — 99215 OFFICE O/P EST HI 40 MIN: CPT

## 2019-02-25 NOTE — ASSESSMENT
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 Based on IMDC risk prediction he has leukocytosis, anemia, thrombocytosis and hypercalcemia, time to initiated treatment less than one year, 5/6 factors. He has poor risk mccRCC.  The standard of care is combined immunotherapy nivo and ipi based on CHECKMATE 214 trial with significant survival benefit as compared to sunitinib group. I also mentioned to the patient cabozantinib based on [place] trial with PFS and CARTY benefit as compared with sunitinib.S/p cycle 1 nivo and ipi.  The potential AEs are including but not limited to skin rashes, arthralgia, myositis, colitis, hepatitis, nephritis, pneumonitis, thyroiditis, hypophysitis. S/p gamma knife for his brain lesion\par \par Plan\par continue cycle 2 nivo and ipi for his mRCC\par check labs\par follow up with Dr. Rivers after gamma knife\par RTC in 3 weeks

## 2019-02-25 NOTE — HISTORY OF PRESENT ILLNESS
[Disease: _____________________] : Disease: [unfilled] [T: ___] : T[unfilled] [N: ___] : N[unfilled] [M: ___] : M[unfilled] [AJCC Stage: ____] : AJCC Stage: [unfilled] [de-identified] : Pt is a 68 year old man w/ a PMHx GERD, smoking presenting w/ progressive back pain x4 months, subjective fever and cough/sputum, shortness of breath, was hospitalized to [facility]. He states that the back pain is dull, intermittent, and located inferior to his right scapula, and occasional radiated to the side of his chest. Pt states that the pain has gotten worse over the past few months, and it worsens with movement. It frequently awakens him from sleep, but improves with two Advil. Pt also endorses two months of an intermittent cough productive of whitish sputum. Pt also states 30lbs weight loss in last 6 months. He becomes sob on exertion. Pt states he also has a decreased appetite. He also has occasional subjective fevers/chills.  In ED, he was found to have  Alk phos 321, Ca 11.5. CXR showed possible RUL PNA and multiple opacities. CT chest: RUL and LLL lesion, innumerable b/l opacities, hilar lymphadenopathy, b/l adrenal mets. Pt admitted to medical floors for further management and w/u. CT A/P showed an 11.1 cm necrotic mass inseparable from the lower pole of the left kidney; para-aortic lymphadenopathy. Pt followed by oncology team, urology team. Pt w/ decreasing H/H during hospitalization, requiring 1U pRBCs. Started on iron supplementation. Pt had brain MRI which showed left sided frontoparietal metastasis with associated edema. Pt started on decadron 4 mg q6. Neurosurgery recommended outpt SRS. Radiation oncology recommended gamma knife as an outpt. Pt underwent IR biopsy of his lung. \par  [de-identified] : clear cell renal cell carcinoma [de-identified] : S/P cycle 1 nivo and ipi and s/p gamma knife for his brain lesion with Dr. Rivers. His upper back pain is much better, 1 to 2/10. His appetite is good. His energy level is good. He denies diarrhea, skin rashes.

## 2019-02-28 ENCOUNTER — INBOUND DOCUMENT (OUTPATIENT)
Age: 69
End: 2019-02-28

## 2019-03-01 ENCOUNTER — LABORATORY RESULT (OUTPATIENT)
Age: 69
End: 2019-03-01

## 2019-03-01 ENCOUNTER — RESULT REVIEW (OUTPATIENT)
Age: 69
End: 2019-03-01

## 2019-03-01 ENCOUNTER — APPOINTMENT (OUTPATIENT)
Dept: INFUSION THERAPY | Facility: HOSPITAL | Age: 69
End: 2019-03-01

## 2019-03-01 LAB
BASOPHILS # BLD AUTO: 0.1 K/UL — SIGNIFICANT CHANGE UP (ref 0–0.2)
BASOPHILS NFR BLD AUTO: 0.8 % — SIGNIFICANT CHANGE UP (ref 0–2)
EOSINOPHIL # BLD AUTO: 0 K/UL — SIGNIFICANT CHANGE UP (ref 0–0.5)
EOSINOPHIL NFR BLD AUTO: 0.7 % — SIGNIFICANT CHANGE UP (ref 0–6)
HCT VFR BLD CALC: 30.5 % — LOW (ref 39–50)
HGB BLD-MCNC: 9 G/DL — LOW (ref 13–17)
LYMPHOCYTES # BLD AUTO: 2.1 K/UL — SIGNIFICANT CHANGE UP (ref 1–3.3)
LYMPHOCYTES # BLD AUTO: 29.5 % — SIGNIFICANT CHANGE UP (ref 13–44)
MCHC RBC-ENTMCNC: 22.6 PG — LOW (ref 27–34)
MCHC RBC-ENTMCNC: 29.5 G/DL — LOW (ref 32–36)
MCV RBC AUTO: 76.7 FL — LOW (ref 80–100)
MONOCYTES # BLD AUTO: 0.8 K/UL — SIGNIFICANT CHANGE UP (ref 0–0.9)
MONOCYTES NFR BLD AUTO: 11.1 % — SIGNIFICANT CHANGE UP (ref 2–14)
NEUTROPHILS # BLD AUTO: 4.1 K/UL — SIGNIFICANT CHANGE UP (ref 1.8–7.4)
NEUTROPHILS NFR BLD AUTO: 58 % — SIGNIFICANT CHANGE UP (ref 43–77)
PLATELET # BLD AUTO: 358 K/UL — SIGNIFICANT CHANGE UP (ref 150–400)
RBC # BLD: 3.98 M/UL — LOW (ref 4.2–5.8)
RBC # FLD: 19.1 % — HIGH (ref 10.3–14.5)
WBC # BLD: 7.1 K/UL — SIGNIFICANT CHANGE UP (ref 3.8–10.5)
WBC # FLD AUTO: 7.1 K/UL — SIGNIFICANT CHANGE UP (ref 3.8–10.5)

## 2019-03-04 ENCOUNTER — OUTPATIENT (OUTPATIENT)
Dept: OUTPATIENT SERVICES | Facility: HOSPITAL | Age: 69
LOS: 1 days | Discharge: HOME | End: 2019-03-04

## 2019-03-04 DIAGNOSIS — Z90.49 ACQUIRED ABSENCE OF OTHER SPECIFIED PARTS OF DIGESTIVE TRACT: Chronic | ICD-10-CM

## 2019-03-04 DIAGNOSIS — C64.9 MALIGNANT NEOPLASM OF UNSPECIFIED KIDNEY, EXCEPT RENAL PELVIS: ICD-10-CM

## 2019-03-11 ENCOUNTER — APPOINTMENT (OUTPATIENT)
Dept: HEMATOLOGY ONCOLOGY | Facility: CLINIC | Age: 69
End: 2019-03-11
Payer: COMMERCIAL

## 2019-03-11 VITALS
BODY MASS INDEX: 23.74 KG/M2 | WEIGHT: 176.37 LBS | SYSTOLIC BLOOD PRESSURE: 143 MMHG | DIASTOLIC BLOOD PRESSURE: 73 MMHG | TEMPERATURE: 97 F | RESPIRATION RATE: 16 BRPM | HEART RATE: 111 BPM | OXYGEN SATURATION: 97 %

## 2019-03-11 PROCEDURE — 99215 OFFICE O/P EST HI 40 MIN: CPT

## 2019-03-11 RX ORDER — PANTOPRAZOLE SODIUM 40 MG/10ML
40 INJECTION, POWDER, FOR SOLUTION INTRAVENOUS
Refills: 0 | Status: ACTIVE | COMMUNITY

## 2019-03-11 NOTE — REVIEW OF SYSTEMS
[Cough] : cough [Joint Pain] : joint pain [Negative] : Allergic/Immunologic [Fever] : no fever [Fatigue] : no fatigue [Recent Change In Weight] : ~T no recent weight change [Shortness Of Breath] : no shortness of breath [Wheezing] : no wheezing [SOB on Exertion] : no shortness of breath during exertion [Abdominal Pain] : no abdominal pain [Vomiting] : no vomiting [Constipation] : no constipation [Diarrhea] : no diarrhea [FreeTextEntry7] : LUQ discomfort [FreeTextEntry9] : right scapula pain

## 2019-03-11 NOTE — ASSESSMENT
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 Based on IMDC risk prediction he has leukocytosis, anemia, thrombocytosis and hypercalcemia, time to initiated treatment less than one year, 5/6 factors. He has poor risk mccRCC.  The standard of care is combined immunotherapy nivo and ipi based on CHECKMATE 214 trial with significant survival benefit as compared to sunitinib group. I also mentioned to the patient cabozantinib based on [place] trial with PFS and CARTY benefit as compared with sunitinib.S/p cycle 1 nivo and ipi.  The potential AEs are including but not limited to skin rashes, arthralgia, myositis, colitis, hepatitis, nephritis, pneumonitis, thyroiditis, hypophysitis. S/p gamma knife for his brain lesion. He has fatigue, decreased appetite now. s/p 2 cycles of nivo and ipi. \par \par Plan\par continue cycle 3 nivo and ipi for his mRCC\par check labs\par follow up with Dr. Rivers after gamma knife\par RTC in 3 weeks

## 2019-03-11 NOTE — HISTORY OF PRESENT ILLNESS
[Disease: _____________________] : Disease: [unfilled] [T: ___] : T[unfilled] [N: ___] : N[unfilled] [M: ___] : M[unfilled] [AJCC Stage: ____] : AJCC Stage: [unfilled] [de-identified] : Pt is a 68 year old man w/ a PMHx GERD, smoking presenting w/ progressive back pain x4 months, subjective fever and cough/sputum, shortness of breath, was hospitalized to [facility]. He states that the back pain is dull, intermittent, and located inferior to his right scapula, and occasional radiated to the side of his chest. Pt states that the pain has gotten worse over the past few months, and it worsens with movement. It frequently awakens him from sleep, but improves with two Advil. Pt also endorses two months of an intermittent cough productive of whitish sputum. Pt also states 30lbs weight loss in last 6 months. He becomes sob on exertion. Pt states he also has a decreased appetite. He also has occasional subjective fevers/chills.  In ED, he was found to have  Alk phos 321, Ca 11.5. CXR showed possible RUL PNA and multiple opacities. CT chest: RUL and LLL lesion, innumerable b/l opacities, hilar lymphadenopathy, b/l adrenal mets. Pt admitted to medical floors for further management and w/u. CT A/P showed an 11.1 cm necrotic mass inseparable from the lower pole of the left kidney; para-aortic lymphadenopathy. Pt followed by oncology team, urology team. Pt w/ decreasing H/H during hospitalization, requiring 1U pRBCs. Started on iron supplementation. Pt had brain MRI which showed left sided frontoparietal metastasis with associated edema. Pt started on decadron 4 mg q6. Neurosurgery recommended outpt SRS. Radiation oncology recommended gamma knife as an outpt. Pt underwent IR biopsy of his lung. \par  [de-identified] : clear cell renal cell carcinoma [de-identified] : S/P cycle 2 nivo and ipi and s/p gamma knife for his brain lesion with Dr. Rivers.\par \par He feels tired.  His appetite is reduced. His energy level is decreased too. His upper back pain is much better, 2/10.   He denies diarrhea, skin rashes.

## 2019-03-20 ENCOUNTER — LABORATORY RESULT (OUTPATIENT)
Age: 69
End: 2019-03-20

## 2019-03-20 ENCOUNTER — APPOINTMENT (OUTPATIENT)
Dept: INFUSION THERAPY | Facility: HOSPITAL | Age: 69
End: 2019-03-20

## 2019-03-20 ENCOUNTER — OTHER (OUTPATIENT)
Age: 69
End: 2019-03-20

## 2019-03-20 ENCOUNTER — RESULT REVIEW (OUTPATIENT)
Age: 69
End: 2019-03-20

## 2019-03-20 LAB
BASOPHILS # BLD AUTO: 0.1 K/UL — SIGNIFICANT CHANGE UP (ref 0–0.2)
BASOPHILS NFR BLD AUTO: 0.5 % — SIGNIFICANT CHANGE UP (ref 0–2)
EOSINOPHIL # BLD AUTO: 0.1 K/UL — SIGNIFICANT CHANGE UP (ref 0–0.5)
EOSINOPHIL NFR BLD AUTO: 0.6 % — SIGNIFICANT CHANGE UP (ref 0–6)
HCT VFR BLD CALC: 28.3 % — LOW (ref 39–50)
HGB BLD-MCNC: 8.8 G/DL — LOW (ref 13–17)
LYMPHOCYTES # BLD AUTO: 19.3 % — SIGNIFICANT CHANGE UP (ref 13–44)
LYMPHOCYTES # BLD AUTO: 2.2 K/UL — SIGNIFICANT CHANGE UP (ref 1–3.3)
MCHC RBC-ENTMCNC: 23.7 PG — LOW (ref 27–34)
MCHC RBC-ENTMCNC: 31.2 G/DL — LOW (ref 32–36)
MCV RBC AUTO: 75.8 FL — LOW (ref 80–100)
MONOCYTES # BLD AUTO: 1.2 K/UL — HIGH (ref 0–0.9)
MONOCYTES NFR BLD AUTO: 10.2 % — SIGNIFICANT CHANGE UP (ref 2–14)
NEUTROPHILS # BLD AUTO: 7.9 K/UL — HIGH (ref 1.8–7.4)
NEUTROPHILS NFR BLD AUTO: 69.5 % — SIGNIFICANT CHANGE UP (ref 43–77)
PLATELET # BLD AUTO: 547 K/UL — HIGH (ref 150–400)
RBC # BLD: 3.74 M/UL — LOW (ref 4.2–5.8)
RBC # FLD: 17.3 % — HIGH (ref 10.3–14.5)
WBC # BLD: 11.3 K/UL — HIGH (ref 3.8–10.5)
WBC # FLD AUTO: 11.3 K/UL — HIGH (ref 3.8–10.5)

## 2019-03-21 DIAGNOSIS — Z51.11 ENCOUNTER FOR ANTINEOPLASTIC CHEMOTHERAPY: ICD-10-CM

## 2019-04-03 ENCOUNTER — RESULT REVIEW (OUTPATIENT)
Age: 69
End: 2019-04-03

## 2019-04-03 ENCOUNTER — APPOINTMENT (OUTPATIENT)
Dept: HEMATOLOGY ONCOLOGY | Facility: CLINIC | Age: 69
End: 2019-04-03

## 2019-04-03 ENCOUNTER — APPOINTMENT (OUTPATIENT)
Dept: HEMATOLOGY ONCOLOGY | Facility: CLINIC | Age: 69
End: 2019-04-03
Payer: COMMERCIAL

## 2019-04-03 VITALS
OXYGEN SATURATION: 98 % | HEART RATE: 110 BPM | DIASTOLIC BLOOD PRESSURE: 77 MMHG | RESPIRATION RATE: 22 BRPM | TEMPERATURE: 97.7 F | WEIGHT: 167.55 LBS | BODY MASS INDEX: 22.55 KG/M2 | SYSTOLIC BLOOD PRESSURE: 131 MMHG

## 2019-04-03 LAB
BASOPHILS # BLD AUTO: 0 K/UL — SIGNIFICANT CHANGE UP (ref 0–0.2)
BASOPHILS NFR BLD AUTO: 0.3 % — SIGNIFICANT CHANGE UP (ref 0–2)
EOSINOPHIL # BLD AUTO: 0.3 K/UL — SIGNIFICANT CHANGE UP (ref 0–0.5)
EOSINOPHIL NFR BLD AUTO: 3 % — SIGNIFICANT CHANGE UP (ref 0–6)
HCT VFR BLD CALC: 28.7 % — LOW (ref 39–50)
HGB BLD-MCNC: 8.7 G/DL — LOW (ref 13–17)
LYMPHOCYTES # BLD AUTO: 19.3 % — SIGNIFICANT CHANGE UP (ref 13–44)
LYMPHOCYTES # BLD AUTO: 2.2 K/UL — SIGNIFICANT CHANGE UP (ref 1–3.3)
MCHC RBC-ENTMCNC: 22.9 PG — LOW (ref 27–34)
MCHC RBC-ENTMCNC: 30.3 G/DL — LOW (ref 32–36)
MCV RBC AUTO: 75.5 FL — LOW (ref 80–100)
MONOCYTES # BLD AUTO: 1.2 K/UL — HIGH (ref 0–0.9)
MONOCYTES NFR BLD AUTO: 10.5 % — SIGNIFICANT CHANGE UP (ref 2–14)
NEUTROPHILS # BLD AUTO: 7.7 K/UL — HIGH (ref 1.8–7.4)
NEUTROPHILS NFR BLD AUTO: 67 % — SIGNIFICANT CHANGE UP (ref 43–77)
PLATELET # BLD AUTO: 577 K/UL — HIGH (ref 150–400)
RBC # BLD: 3.8 M/UL — LOW (ref 4.2–5.8)
RBC # FLD: 17.2 % — HIGH (ref 10.3–14.5)
WBC # BLD: 11.5 K/UL — HIGH (ref 3.8–10.5)
WBC # FLD AUTO: 11.5 K/UL — HIGH (ref 3.8–10.5)

## 2019-04-03 PROCEDURE — 99215 OFFICE O/P EST HI 40 MIN: CPT

## 2019-04-03 RX ORDER — DEXAMETHASONE 4 MG/1
4 TABLET ORAL
Refills: 0 | Status: DISCONTINUED | COMMUNITY
End: 2019-04-03

## 2019-04-03 RX ORDER — FERROUS SULFATE 325(65) MG
325 TABLET ORAL
Refills: 0 | Status: DISCONTINUED | COMMUNITY
End: 2019-04-03

## 2019-04-03 RX ORDER — IPILIMUMAB 5 MG/ML
50 INJECTION INTRAVENOUS
Refills: 0 | Status: ACTIVE | COMMUNITY
Start: 2019-04-03

## 2019-04-03 RX ORDER — NIVOLUMAB 10 MG/ML
100 INJECTION INTRAVENOUS
Refills: 0 | Status: ACTIVE | COMMUNITY
Start: 2019-04-03

## 2019-04-03 RX ORDER — PANTOPRAZOLE 40 MG/1
40 TABLET, DELAYED RELEASE ORAL DAILY
Qty: 90 | Refills: 2 | Status: DISCONTINUED | COMMUNITY
Start: 2019-03-11 | End: 2019-04-03

## 2019-04-04 ENCOUNTER — OUTPATIENT (OUTPATIENT)
Dept: OUTPATIENT SERVICES | Facility: HOSPITAL | Age: 69
LOS: 1 days | Discharge: HOME | End: 2019-04-04

## 2019-04-04 ENCOUNTER — INPATIENT (INPATIENT)
Facility: HOSPITAL | Age: 69
LOS: 1 days | Discharge: LEFT BEFORE DISCHARGE | End: 2019-04-06
Attending: HOSPITALIST | Admitting: HOSPITALIST
Payer: COMMERCIAL

## 2019-04-04 VITALS
DIASTOLIC BLOOD PRESSURE: 58 MMHG | TEMPERATURE: 98 F | RESPIRATION RATE: 17 BRPM | OXYGEN SATURATION: 100 % | SYSTOLIC BLOOD PRESSURE: 137 MMHG | HEART RATE: 107 BPM

## 2019-04-04 DIAGNOSIS — C64.9 MALIGNANT NEOPLASM OF UNSPECIFIED KIDNEY, EXCEPT RENAL PELVIS: ICD-10-CM

## 2019-04-04 DIAGNOSIS — E87.1 HYPO-OSMOLALITY AND HYPONATREMIA: ICD-10-CM

## 2019-04-04 DIAGNOSIS — K21.9 GASTRO-ESOPHAGEAL REFLUX DISEASE WITHOUT ESOPHAGITIS: ICD-10-CM

## 2019-04-04 DIAGNOSIS — E83.52 HYPERCALCEMIA: ICD-10-CM

## 2019-04-04 DIAGNOSIS — D64.9 ANEMIA, UNSPECIFIED: ICD-10-CM

## 2019-04-04 DIAGNOSIS — Z90.49 ACQUIRED ABSENCE OF OTHER SPECIFIED PARTS OF DIGESTIVE TRACT: Chronic | ICD-10-CM

## 2019-04-04 DIAGNOSIS — Z29.9 ENCOUNTER FOR PROPHYLACTIC MEASURES, UNSPECIFIED: ICD-10-CM

## 2019-04-04 DIAGNOSIS — C64.2 MALIGNANT NEOPLASM OF LEFT KIDNEY, EXCEPT RENAL PELVIS: ICD-10-CM

## 2019-04-04 DIAGNOSIS — R74.8 ABNORMAL LEVELS OF OTHER SERUM ENZYMES: ICD-10-CM

## 2019-04-04 LAB
ALBUMIN SERPL ELPH-MCNC: 3 G/DL
ALBUMIN SERPL ELPH-MCNC: 3.2 G/DL — LOW (ref 3.3–5)
ALP BLD-CCNC: 210 U/L
ALP SERPL-CCNC: 237 U/L — HIGH (ref 40–120)
ALT FLD-CCNC: 21 U/L — SIGNIFICANT CHANGE UP (ref 4–41)
ALT SERPL-CCNC: 16 U/L
ANION GAP SERPL CALC-SCNC: 10 MMO/L — SIGNIFICANT CHANGE UP (ref 7–14)
ANION GAP SERPL CALC-SCNC: 10 MMO/L — SIGNIFICANT CHANGE UP (ref 7–14)
ANION GAP SERPL CALC-SCNC: 13 MMOL/L
AST SERPL-CCNC: 17 U/L
AST SERPL-CCNC: 24 U/L — SIGNIFICANT CHANGE UP (ref 4–40)
BASOPHILS # BLD AUTO: 0.07 K/UL — SIGNIFICANT CHANGE UP (ref 0–0.2)
BASOPHILS NFR BLD AUTO: 0.6 % — SIGNIFICANT CHANGE UP (ref 0–2)
BILIRUB SERPL-MCNC: 0.4 MG/DL
BILIRUB SERPL-MCNC: 0.4 MG/DL — SIGNIFICANT CHANGE UP (ref 0.2–1.2)
BUN SERPL-MCNC: 13 MG/DL
BUN SERPL-MCNC: 14 MG/DL — SIGNIFICANT CHANGE UP (ref 7–23)
BUN SERPL-MCNC: 15 MG/DL — SIGNIFICANT CHANGE UP (ref 7–23)
CA-I BLD-SCNC: 1.52 MMOL/L — HIGH (ref 1.03–1.23)
CALCIUM SERPL-MCNC: 11.1 MG/DL — HIGH (ref 8.4–10.5)
CALCIUM SERPL-MCNC: 11.8 MG/DL — HIGH (ref 8.4–10.5)
CALCIUM SERPL-MCNC: 12.1 MG/DL
CHLORIDE SERPL-SCNC: 96 MMOL/L
CHLORIDE SERPL-SCNC: 97 MMOL/L — LOW (ref 98–107)
CHLORIDE SERPL-SCNC: 99 MMOL/L — SIGNIFICANT CHANGE UP (ref 98–107)
CO2 SERPL-SCNC: 24 MMOL/L
CO2 SERPL-SCNC: 24 MMOL/L — SIGNIFICANT CHANGE UP (ref 22–31)
CO2 SERPL-SCNC: 25 MMOL/L — SIGNIFICANT CHANGE UP (ref 22–31)
CREAT SERPL-MCNC: 0.85 MG/DL — SIGNIFICANT CHANGE UP (ref 0.5–1.3)
CREAT SERPL-MCNC: 0.91 MG/DL — SIGNIFICANT CHANGE UP (ref 0.5–1.3)
CREAT SERPL-MCNC: 0.99 MG/DL
EOSINOPHIL # BLD AUTO: 0.47 K/UL — SIGNIFICANT CHANGE UP (ref 0–0.5)
EOSINOPHIL NFR BLD AUTO: 4.2 % — SIGNIFICANT CHANGE UP (ref 0–6)
GLUCOSE SERPL-MCNC: 135 MG/DL — HIGH (ref 70–99)
GLUCOSE SERPL-MCNC: 154 MG/DL — HIGH (ref 70–99)
GLUCOSE SERPL-MCNC: 158 MG/DL
HCT VFR BLD CALC: 27.7 % — LOW (ref 39–50)
HGB BLD-MCNC: 7.8 G/DL — LOW (ref 13–17)
IMM GRANULOCYTES NFR BLD AUTO: 0.4 % — SIGNIFICANT CHANGE UP (ref 0–1.5)
LDH SERPL-CCNC: 182 U/L
LYMPHOCYTES # BLD AUTO: 18.2 % — SIGNIFICANT CHANGE UP (ref 13–44)
LYMPHOCYTES # BLD AUTO: 2.05 K/UL — SIGNIFICANT CHANGE UP (ref 1–3.3)
MAGNESIUM SERPL-MCNC: 1.8 MG/DL — SIGNIFICANT CHANGE UP (ref 1.6–2.6)
MCHC RBC-ENTMCNC: 21.7 PG — LOW (ref 27–34)
MCHC RBC-ENTMCNC: 28.2 % — LOW (ref 32–36)
MCV RBC AUTO: 76.9 FL — LOW (ref 80–100)
MONOCYTES # BLD AUTO: 1.25 K/UL — HIGH (ref 0–0.9)
MONOCYTES NFR BLD AUTO: 11.1 % — SIGNIFICANT CHANGE UP (ref 2–14)
NEUTROPHILS # BLD AUTO: 7.39 K/UL — SIGNIFICANT CHANGE UP (ref 1.8–7.4)
NEUTROPHILS NFR BLD AUTO: 65.5 % — SIGNIFICANT CHANGE UP (ref 43–77)
NRBC # FLD: 0 K/UL — SIGNIFICANT CHANGE UP (ref 0–0)
PHOSPHATE SERPL-MCNC: 2.4 MG/DL — LOW (ref 2.5–4.5)
PLATELET # BLD AUTO: 517 K/UL — HIGH (ref 150–400)
PMV BLD: 8.5 FL — SIGNIFICANT CHANGE UP (ref 7–13)
POTASSIUM SERPL-MCNC: 4.2 MMOL/L — SIGNIFICANT CHANGE UP (ref 3.5–5.3)
POTASSIUM SERPL-MCNC: 4.3 MMOL/L — SIGNIFICANT CHANGE UP (ref 3.5–5.3)
POTASSIUM SERPL-SCNC: 4.2 MMOL/L — SIGNIFICANT CHANGE UP (ref 3.5–5.3)
POTASSIUM SERPL-SCNC: 4.3 MMOL/L — SIGNIFICANT CHANGE UP (ref 3.5–5.3)
POTASSIUM SERPL-SCNC: 5.6 MMOL/L
PROT SERPL-MCNC: 7.4 G/DL
PROT SERPL-MCNC: 8.2 G/DL — SIGNIFICANT CHANGE UP (ref 6–8.3)
PTH-INTACT SERPL-MCNC: 12.85 PG/ML — LOW (ref 15–65)
RBC # BLD: 3.6 M/UL — LOW (ref 4.2–5.8)
RBC # FLD: 17.8 % — HIGH (ref 10.3–14.5)
SODIUM SERPL-SCNC: 131 MMOL/L — LOW (ref 135–145)
SODIUM SERPL-SCNC: 133 MMOL/L
SODIUM SERPL-SCNC: 134 MMOL/L — LOW (ref 135–145)
TSH SERPL-ACNC: 0.24 UIU/ML
WBC # BLD: 11.27 K/UL — HIGH (ref 3.8–10.5)
WBC # FLD AUTO: 11.27 K/UL — HIGH (ref 3.8–10.5)

## 2019-04-04 PROCEDURE — 99223 1ST HOSP IP/OBS HIGH 75: CPT | Mod: GC

## 2019-04-04 PROCEDURE — 99222 1ST HOSP IP/OBS MODERATE 55: CPT

## 2019-04-04 RX ORDER — FAMOTIDINE 10 MG/ML
20 INJECTION INTRAVENOUS
Qty: 0 | Refills: 0 | Status: DISCONTINUED | OUTPATIENT
Start: 2019-04-04 | End: 2019-04-06

## 2019-04-04 RX ORDER — SODIUM CHLORIDE 9 MG/ML
1000 INJECTION INTRAMUSCULAR; INTRAVENOUS; SUBCUTANEOUS ONCE
Qty: 0 | Refills: 0 | Status: COMPLETED | OUTPATIENT
Start: 2019-04-04 | End: 2019-04-04

## 2019-04-04 RX ORDER — HEPARIN SODIUM 5000 [USP'U]/ML
5000 INJECTION INTRAVENOUS; SUBCUTANEOUS EVERY 8 HOURS
Qty: 0 | Refills: 0 | Status: DISCONTINUED | OUTPATIENT
Start: 2019-04-04 | End: 2019-04-05

## 2019-04-04 RX ORDER — SODIUM CHLORIDE 9 MG/ML
1000 INJECTION INTRAMUSCULAR; INTRAVENOUS; SUBCUTANEOUS
Qty: 0 | Refills: 0 | Status: DISCONTINUED | OUTPATIENT
Start: 2019-04-04 | End: 2019-04-05

## 2019-04-04 RX ORDER — SODIUM CHLORIDE 9 MG/ML
1000 INJECTION INTRAMUSCULAR; INTRAVENOUS; SUBCUTANEOUS
Qty: 0 | Refills: 0 | Status: DISCONTINUED | OUTPATIENT
Start: 2019-04-04 | End: 2019-04-04

## 2019-04-04 RX ORDER — ACETAMINOPHEN 500 MG
650 TABLET ORAL EVERY 6 HOURS
Qty: 0 | Refills: 0 | Status: DISCONTINUED | OUTPATIENT
Start: 2019-04-04 | End: 2019-04-06

## 2019-04-04 RX ADMIN — SODIUM CHLORIDE 100 MILLILITER(S): 9 INJECTION INTRAMUSCULAR; INTRAVENOUS; SUBCUTANEOUS at 17:56

## 2019-04-04 RX ADMIN — SODIUM CHLORIDE 1000 MILLILITER(S): 9 INJECTION INTRAMUSCULAR; INTRAVENOUS; SUBCUTANEOUS at 13:55

## 2019-04-04 RX ADMIN — Medication 650 MILLIGRAM(S): at 21:42

## 2019-04-04 RX ADMIN — HEPARIN SODIUM 5000 UNIT(S): 5000 INJECTION INTRAVENOUS; SUBCUTANEOUS at 21:42

## 2019-04-04 RX ADMIN — Medication 650 MILLIGRAM(S): at 22:40

## 2019-04-04 RX ADMIN — SODIUM CHLORIDE 100 MILLILITER(S): 9 INJECTION INTRAMUSCULAR; INTRAVENOUS; SUBCUTANEOUS at 21:42

## 2019-04-04 NOTE — H&P ADULT - PROBLEM SELECTOR PROBLEM 3
Gastroesophageal reflux disease without esophagitis Alkaline phosphatase elevation Anemia, unspecified type

## 2019-04-04 NOTE — ED PROVIDER NOTE - OBJECTIVE STATEMENT
68 M with hx of metastatic CA (patient poor historian and not sure what type) treated with chemotherapy at [facility], sent for abnormal bloodwork.  Patient not sure what was wrong but states "maybe I'm dehydrated."  Patient denies any other complaints.

## 2019-04-04 NOTE — CONSULT NOTE ADULT - ASSESSMENT
68m with metastatic RCC, mets to lung, brain on immunotherapy with ipilimuman/nivolumab, found to have hypercalcemia and hyponatremia and was referred to ED.     Corrected Ca level is 12.6.    -IV hydration with NS, monitor BMP, may been lasix or pamidronate for hypercalcemia if it does not resolve with hydration  -active type and screen, transfuse if hgb<7 or in case of symptoms  -Bowel regimen  -outpt oncology f/u    Will follow. Please do not hesitate to call back with questions.     Suyapa Dietz MD  Medical Oncology Attending

## 2019-04-04 NOTE — H&P ADULT - ATTENDING COMMENTS
Agree with Housestaff Note Above, edits made where appropriate, case discussed with housestaff    Patient seen and examined. This is a 68M being admitted for hypercalcemia. Sent in from [facility] for check up of outpatient labs. Currently denies headaches, fevers, chills, vision changes, malaise, weakness, chest discomfort, cough, dyspnea, nausea, vomiting, diarrhea, constipation, dysuria, skin changes, bruising, heat/cold intolerance.   VS and PE above  Labs and Imaging Reviewed  Agree with A&P, appreciate further heme/onc recommendations for hypercalcemia management. c/w IVF for now, check labs tonight given hypochloremic hyponatremia with hypercalcemia. May need additional pharmacologic intervention if IVF not successful in lowering calcium. Rest of plan above    Hank Tamez DO  Division of Hospital Medicine  Pager #: [phone]

## 2019-04-04 NOTE — H&P ADULT - PROBLEM SELECTOR PLAN 4
on Nivolumab/ipilimumab s/p 3 cycles and s/p gammaknife   - f/u heme/onc recs 2/2 ipilimumab medication vs bone disease  - f/u ggt

## 2019-04-04 NOTE — H&P ADULT - NSHPPHYSICALEXAM_GEN_ALL_CORE
T(C): 36.8 (04-04-19 @ 12:52), Max: 36.8 (04-04-19 @ 12:52)  HR: 80 (04-04-19 @ 15:57) (80 - 107)  BP: 100/55 (04-04-19 @ 15:57) (100/55 - 137/58)  RR: 16 (04-04-19 @ 15:57) (16 - 17)  SpO2: 100% (04-04-19 @ 15:57) (100% - 100%)    PHYSICAL EXAM:  GENERAL: NAD, well-groomed, well-developed  HEAD:  Atraumatic, Normocephalic  EYES: EOMI, PERRLA, conjunctiva and sclera clear  ENMT: No tonsillar erythema, exudates, or enlargement; Moist mucous membranes, Good dentition, No lesions  NECK: Supple, No JVD  CHEST/LUNG: Clear to auscultation bilaterally; No rales, rhonchi, wheezing  HEART: Regular rate and rhythm; No murmurs, rubs, or gallops  ABDOMEN: Soft, Nontender, Nondistended; Bowel sounds present  EXTREMITIES:  2+ Peripheral Pulses, No clubbing, cyanosis, or edema  LYMPH: No lymphadenopathy noted  SKIN: No rashes or lesions  NERVOUS SYSTEM:  Alert & Oriented X3, Good concentration; No focal deficits. Strength 5/5 B/L upper and lower extremities

## 2019-04-04 NOTE — H&P ADULT - HISTORY OF PRESENT ILLNESS
69 yo M w Hx of newly diagnosed Metastatic clear cell RCC to the brain and lungs, on chemo(Nivolumab/ipilimumab x3 cycles) s/p gammaknife presents from [facility] due to hypercalcemia on outpatient labs. Pt was recently diagnosed with metastatic Clear cell RCC in Jan/Feb via lung bx. He is now s/p gamma knife radiosurgery and receiving novolumab/ipilimumab q3wk, s/p 3 cycles scheduled for next dose on 4/9-10. He endorses b/l LE leg tenderness for past 1.5 wks which has improved with tylenol. Endorses decreased appetite and he has been drinking more milkshakes and eating ice cream over the past 2 wks to increase his caloric intake. He denies h/a, chest pain, abd pain, dyspnea, extremity pain or dysuria. endorses regular BM. He lives alone and is fully independent.

## 2019-04-04 NOTE — H&P ADULT - PROBLEM SELECTOR PLAN 6
VTE ppx:  lovenox  diet: regular, low calcium  dispo: pending improvement - cont cemetidine or equivalent

## 2019-04-04 NOTE — H&P ADULT - PROBLEM SELECTOR PLAN 1
likely 2/2 malignancy vs chemo induced  - f/u repeat bmp, ionized calcium, pth, PTHrp, vitamin d 1,25  - s/p 1L NS in ED, cont NS at 150cc/h, if no improvement, consider lasix likely 2/2 malignancy vs chemo induced  - f/u repeat bmp, ionized calcium, pth, PTHrp, vitamin d 1,25 tonight  - s/p 1L NS in ED, cont NS at 100cc/h, if no improvement, consider lasix  - No EKG changes, if continues to increase, will need to transfer to tele

## 2019-04-04 NOTE — H&P ADULT - PROBLEM SELECTOR PLAN 5
- cont cemetidine or equivalent on Nivolumab/ipilimumab s/p 3 cycles and s/p gammaknife   - f/u heme/onc recs

## 2019-04-04 NOTE — H&P ADULT - NSICDXPASTMEDICALHX_GEN_ALL_CORE_FT
PAST MEDICAL HISTORY:  Appendicitis     GERD (gastroesophageal reflux disease)     Metastatic renal cell carcinoma to brain and lung s/p gammaknife, 3 cycles nipo/ipi

## 2019-04-04 NOTE — H&P ADULT - PROBLEM SELECTOR PLAN 3
2/2 medication vs bone disease  - f/u ggt Hg at 7.8, likely 2/2 malignancy vs chemo induced (ipilimumab/ monoclonal ab) Hg at 7.8, likely 2/2 malignancy vs chemo induced (ipilimumab/ monoclonal ab)  - monitor cbc qd  - type and screen active  - transfuse for hg<7

## 2019-04-04 NOTE — ED ADULT TRIAGE NOTE - CHIEF COMPLAINT QUOTE
pt said he received a phone call saying he was dehydrated and go to the emergency room. pt denies medical complaints.

## 2019-04-04 NOTE — H&P ADULT - NSHPSOCIALHISTORY_GEN_ALL_CORE
Prior smoker, 1ppd x 20-30 years, Social ETOH use. No drug use. Lives alone,  with kids. Previously worked as .

## 2019-04-04 NOTE — H&P ADULT - PROBLEM SELECTOR PROBLEM 5
Gastroesophageal reflux disease without esophagitis Renal cell carcinoma of left kidney metastatic to other site

## 2019-04-04 NOTE — ED PROVIDER NOTE - PROGRESS NOTE DETAILS
Discussed with Heme/onc fellow, labs yesterday had Na 133 Ca 12.1, given Ca elevated today would like admission for hypercalcemia and hydration and will follow on floor.

## 2019-04-04 NOTE — H&P ADULT - PROBLEM SELECTOR PLAN 2
hypotonic hyponatremia, likely 2/2 poor oral intake  - cont IV hydration as above  - regular diet, low calcium hypotonic hyponatremia, likely 2/2 poor oral intake vs chemo induced(nivolumab/ anti PD1)  - cont IV hydration as above  - regular diet, low calcium

## 2019-04-04 NOTE — CONSULT NOTE ADULT - SUBJECTIVE AND OBJECTIVE BOX
Patient is a 68y old  Male who presents with a chief complaint of     HPI:  68m with metastatic RCC, mets to lung, brain on immunotherapy with ipilimuman/nivolumab, found to have hypercalcemia and hyponatremia and was referred to ED.     ROS: as above     PAST MEDICAL & SURGICAL HISTORY:  GERD (gastroesophageal reflux disease)  Appendicitis  History of appendectomy      SOCIAL HISTORY:    FAMILY HISTORY:  No pertinent family history in first degree relatives      MEDICATIONS  (STANDING):  sodium chloride 0.9%. 1000 milliLiter(s) (150 mL/Hr) IV Continuous <Continuous>    MEDICATIONS  (PRN):      Allergies    No Known Allergies    Intolerances        Vital Signs Last 24 Hrs  T(C): 36.8 (04 Apr 2019 12:52), Max: 36.8 (04 Apr 2019 12:52)  T(F): 98.3 (04 Apr 2019 12:52), Max: 98.3 (04 Apr 2019 12:52)  HR: 107 (04 Apr 2019 12:52) (107 - 107)  BP: 137/58 (04 Apr 2019 12:52) (137/58 - 137/58)  BP(mean): --  RR: 17 (04 Apr 2019 12:52) (17 - 17)  SpO2: 100% (04 Apr 2019 12:52) (100% - 100%)    PHYSICAL EXAM  General: adult in NAD  HEENT: clear oropharynx, anicteric sclera, pink conjunctiva  Neck: supple  CV: normal S1/S2 with no murmur rubs or gallops  Lungs: positive air movement b/l ant lungs, clear to auscultation, no wheezes, no rales  Abdomen: soft non-tender non-distended, no hepatosplenomegaly  Ext: no clubbing cyanosis or edema  Skin: no rashes and no petechiae  Neuro: alert and oriented X 3, none focal    LABS:                          7.8    11.27 )-----------( 517      ( 04 Apr 2019 13:55 )             27.7         Mean Cell Volume : 76.9 fL  Mean Cell Hemoglobin : 21.7 pg  Mean Cell Hemoglobin Concentration : 28.2 %  Auto Neutrophil # : 7.39 K/uL  Auto Lymphocyte # : 2.05 K/uL  Auto Monocyte # : 1.25 K/uL  Auto Eosinophil # : 0.47 K/uL  Auto Basophil # : 0.07 K/uL  Auto Neutrophil % : 65.5 %  Auto Lymphocyte % : 18.2 %  Auto Monocyte % : 11.1 %  Auto Eosinophil % : 4.2 %  Auto Basophil % : 0.6 %      Serial CBC's  04-04 @ 13:55  Hct-27.7 / Hgb-7.8 / Plat-517 / RBC-3.60 / WBC-11.27  Serial CBC's  04-03 @ 11:01  Hct-28.7 / Hgb-8.7 / Plat-577 / RBC-3.80 / WBC-11.5      04-04    131<L>  |  97<L>  |  15  ----------------------------<  154<H>  4.2   |  24  |  0.85    Ca    11.8<H>      04 Apr 2019 13:55    TPro  8.2  /  Alb  3.2<L>  /  TBili  0.4  /  DBili  x   /  AST  24  /  ALT  21  /  AlkPhos  237<H>  04-04                      RADIOLOGY & ADDITIONAL STUDIES: Patient is a 68y old  Male who presents with a chief complaint of     HPI:  68m with metastatic RCC, mets to lung, brain on immunotherapy with ipilimuman/nivolumab, found to have hypercalcemia and hyponatremia and was referred to ED.   Pt offers no complaints.     ROS: as above     PAST MEDICAL & SURGICAL HISTORY:  GERD (gastroesophageal reflux disease)  Appendicitis  History of appendectomy      SOCIAL HISTORY:    FAMILY HISTORY:  No pertinent family history in first degree relatives      MEDICATIONS  (STANDING):  sodium chloride 0.9%. 1000 milliLiter(s) (150 mL/Hr) IV Continuous <Continuous>    MEDICATIONS  (PRN):      Allergies    No Known Allergies    Intolerances        Vital Signs Last 24 Hrs  T(C): 36.8 (04 Apr 2019 12:52), Max: 36.8 (04 Apr 2019 12:52)  T(F): 98.3 (04 Apr 2019 12:52), Max: 98.3 (04 Apr 2019 12:52)  HR: 107 (04 Apr 2019 12:52) (107 - 107)  BP: 137/58 (04 Apr 2019 12:52) (137/58 - 137/58)  BP(mean): --  RR: 17 (04 Apr 2019 12:52) (17 - 17)  SpO2: 100% (04 Apr 2019 12:52) (100% - 100%)    PHYSICAL EXAM  General: adult in NAD  HEENT: clear oropharynx, anicteric sclera, pink conjunctiva  Neck: supple  CV: normal S1/S2 with no murmur rubs or gallops  Lungs: positive air movement b/l ant lungs, clear to auscultation, no wheezes, no rales  Abdomen: soft non-tender non-distended, no hepatosplenomegaly  Ext: no clubbing cyanosis or edema  Skin: no rashes and no petechiae  Neuro: alert and oriented X 3, none focal    LABS:                          7.8    11.27 )-----------( 517      ( 04 Apr 2019 13:55 )             27.7         Mean Cell Volume : 76.9 fL  Mean Cell Hemoglobin : 21.7 pg  Mean Cell Hemoglobin Concentration : 28.2 %  Auto Neutrophil # : 7.39 K/uL  Auto Lymphocyte # : 2.05 K/uL  Auto Monocyte # : 1.25 K/uL  Auto Eosinophil # : 0.47 K/uL  Auto Basophil # : 0.07 K/uL  Auto Neutrophil % : 65.5 %  Auto Lymphocyte % : 18.2 %  Auto Monocyte % : 11.1 %  Auto Eosinophil % : 4.2 %  Auto Basophil % : 0.6 %      Serial CBC's  04-04 @ 13:55  Hct-27.7 / Hgb-7.8 / Plat-517 / RBC-3.60 / WBC-11.27  Serial CBC's  04-03 @ 11:01  Hct-28.7 / Hgb-8.7 / Plat-577 / RBC-3.80 / WBC-11.5      04-04    131<L>  |  97<L>  |  15  ----------------------------<  154<H>  4.2   |  24  |  0.85    Ca    11.8<H>      04 Apr 2019 13:55    TPro  8.2  /  Alb  3.2<L>  /  TBili  0.4  /  DBili  x   /  AST  24  /  ALT  21  /  AlkPhos  237<H>  04-04              RADIOLOGY & ADDITIONAL STUDIES:    reviewed

## 2019-04-04 NOTE — H&P ADULT - ASSESSMENT
67 yo M w Hx of newly diagnosed Metastatic clear cell RCC to the brain and lungs, on chemo(Nivolumab/ipilimumab x3 cycles) s/p gammaknife presents from [facility] with moderate hypercalcemia

## 2019-04-04 NOTE — H&P ADULT - PROBLEM SELECTOR PROBLEM 4
Prophylactic measure Renal cell carcinoma of left kidney metastatic to other site Alkaline phosphatase elevation

## 2019-04-05 PROBLEM — C64.9 MALIGNANT NEOPLASM OF UNSPECIFIED KIDNEY, EXCEPT RENAL PELVIS: Chronic | Status: ACTIVE | Noted: 2019-04-04

## 2019-04-05 LAB
ALBUMIN SERPL ELPH-MCNC: 2.5 G/DL — LOW (ref 3.3–5)
ALP SERPL-CCNC: 208 U/L — HIGH (ref 40–120)
ALT FLD-CCNC: 20 U/L — SIGNIFICANT CHANGE UP (ref 4–41)
ANION GAP SERPL CALC-SCNC: 11 MMO/L — SIGNIFICANT CHANGE UP (ref 7–14)
AST SERPL-CCNC: 20 U/L — SIGNIFICANT CHANGE UP (ref 4–40)
BILIRUB SERPL-MCNC: 0.4 MG/DL — SIGNIFICANT CHANGE UP (ref 0.2–1.2)
BLD GP AB SCN SERPL QL: NEGATIVE — SIGNIFICANT CHANGE UP
BUN SERPL-MCNC: 12 MG/DL — SIGNIFICANT CHANGE UP (ref 7–23)
CA-I BLD-SCNC: 1.5 MMOL/L — HIGH (ref 1.03–1.23)
CALCIUM SERPL-MCNC: 10.9 MG/DL — HIGH (ref 8.4–10.5)
CHLORIDE SERPL-SCNC: 101 MMOL/L — SIGNIFICANT CHANGE UP (ref 98–107)
CO2 SERPL-SCNC: 22 MMOL/L — SIGNIFICANT CHANGE UP (ref 22–31)
CREAT SERPL-MCNC: 0.84 MG/DL — SIGNIFICANT CHANGE UP (ref 0.5–1.3)
GGT SERPL-CCNC: 123 U/L — HIGH (ref 8–61)
GLUCOSE SERPL-MCNC: 97 MG/DL — SIGNIFICANT CHANGE UP (ref 70–99)
HBA1C BLD-MCNC: 6.5 % — HIGH (ref 4–5.6)
HCT VFR BLD CALC: 25 % — LOW (ref 39–50)
HCT VFR BLD CALC: 25.9 % — LOW (ref 39–50)
HGB BLD-MCNC: 7 G/DL — CRITICAL LOW (ref 13–17)
HGB BLD-MCNC: 7.1 G/DL — LOW (ref 13–17)
MCHC RBC-ENTMCNC: 21.3 PG — LOW (ref 27–34)
MCHC RBC-ENTMCNC: 21.6 PG — LOW (ref 27–34)
MCHC RBC-ENTMCNC: 27.4 % — LOW (ref 32–36)
MCHC RBC-ENTMCNC: 28 % — LOW (ref 32–36)
MCV RBC AUTO: 77.2 FL — LOW (ref 80–100)
MCV RBC AUTO: 77.5 FL — LOW (ref 80–100)
NRBC # FLD: 0 K/UL — SIGNIFICANT CHANGE UP (ref 0–0)
NRBC # FLD: 0 K/UL — SIGNIFICANT CHANGE UP (ref 0–0)
PLATELET # BLD AUTO: 485 K/UL — HIGH (ref 150–400)
PLATELET # BLD AUTO: 521 K/UL — HIGH (ref 150–400)
PMV BLD: 8.7 FL — SIGNIFICANT CHANGE UP (ref 7–13)
PMV BLD: 8.9 FL — SIGNIFICANT CHANGE UP (ref 7–13)
POTASSIUM SERPL-MCNC: 4 MMOL/L — SIGNIFICANT CHANGE UP (ref 3.5–5.3)
POTASSIUM SERPL-SCNC: 4 MMOL/L — SIGNIFICANT CHANGE UP (ref 3.5–5.3)
PROT SERPL-MCNC: 7 G/DL — SIGNIFICANT CHANGE UP (ref 6–8.3)
RBC # BLD: 3.24 M/UL — LOW (ref 4.2–5.8)
RBC # BLD: 3.34 M/UL — LOW (ref 4.2–5.8)
RBC # FLD: 17.7 % — HIGH (ref 10.3–14.5)
RBC # FLD: 17.8 % — HIGH (ref 10.3–14.5)
RH IG SCN BLD-IMP: POSITIVE — SIGNIFICANT CHANGE UP
SODIUM SERPL-SCNC: 134 MMOL/L — LOW (ref 135–145)
VIT D25+D1,25 OH+D1,25 PNL SERPL-MCNC: 20.9 PG/ML — SIGNIFICANT CHANGE UP (ref 19.9–79.3)
WBC # BLD: 10.86 K/UL — HIGH (ref 3.8–10.5)
WBC # BLD: 11.04 K/UL — HIGH (ref 3.8–10.5)
WBC # FLD AUTO: 10.86 K/UL — HIGH (ref 3.8–10.5)
WBC # FLD AUTO: 11.04 K/UL — HIGH (ref 3.8–10.5)

## 2019-04-05 PROCEDURE — 99232 SBSQ HOSP IP/OBS MODERATE 35: CPT

## 2019-04-05 PROCEDURE — 99233 SBSQ HOSP IP/OBS HIGH 50: CPT

## 2019-04-05 RX ORDER — DEXTROSE 50 % IN WATER 50 %
12.5 SYRINGE (ML) INTRAVENOUS ONCE
Qty: 0 | Refills: 0 | Status: DISCONTINUED | OUTPATIENT
Start: 2019-04-05 | End: 2019-04-06

## 2019-04-05 RX ORDER — DEXTROSE 50 % IN WATER 50 %
15 SYRINGE (ML) INTRAVENOUS ONCE
Qty: 0 | Refills: 0 | Status: DISCONTINUED | OUTPATIENT
Start: 2019-04-05 | End: 2019-04-06

## 2019-04-05 RX ORDER — DEXTROSE 50 % IN WATER 50 %
25 SYRINGE (ML) INTRAVENOUS ONCE
Qty: 0 | Refills: 0 | Status: DISCONTINUED | OUTPATIENT
Start: 2019-04-05 | End: 2019-04-06

## 2019-04-05 RX ORDER — INSULIN LISPRO 100/ML
VIAL (ML) SUBCUTANEOUS
Qty: 0 | Refills: 0 | Status: DISCONTINUED | OUTPATIENT
Start: 2019-04-05 | End: 2019-04-06

## 2019-04-05 RX ORDER — PAMIDRONATE DISODIUM 9 MG/ML
30 INJECTION, SOLUTION INTRAVENOUS ONCE
Qty: 0 | Refills: 0 | Status: DISCONTINUED | OUTPATIENT
Start: 2019-04-05 | End: 2019-04-05

## 2019-04-05 RX ORDER — INSULIN LISPRO 100/ML
VIAL (ML) SUBCUTANEOUS AT BEDTIME
Qty: 0 | Refills: 0 | Status: DISCONTINUED | OUTPATIENT
Start: 2019-04-05 | End: 2019-04-06

## 2019-04-05 RX ORDER — SODIUM CHLORIDE 9 MG/ML
1000 INJECTION, SOLUTION INTRAVENOUS
Qty: 0 | Refills: 0 | Status: DISCONTINUED | OUTPATIENT
Start: 2019-04-05 | End: 2019-04-05

## 2019-04-05 RX ORDER — GLUCAGON INJECTION, SOLUTION 0.5 MG/.1ML
1 INJECTION, SOLUTION SUBCUTANEOUS ONCE
Qty: 0 | Refills: 0 | Status: DISCONTINUED | OUTPATIENT
Start: 2019-04-05 | End: 2019-04-06

## 2019-04-05 RX ORDER — SODIUM CHLORIDE 9 MG/ML
1000 INJECTION INTRAMUSCULAR; INTRAVENOUS; SUBCUTANEOUS
Qty: 0 | Refills: 0 | Status: DISCONTINUED | OUTPATIENT
Start: 2019-04-05 | End: 2019-04-06

## 2019-04-05 RX ORDER — PAMIDRONATE DISODIUM 9 MG/ML
60 INJECTION, SOLUTION INTRAVENOUS ONCE
Qty: 0 | Refills: 0 | Status: COMPLETED | OUTPATIENT
Start: 2019-04-05 | End: 2019-04-05

## 2019-04-05 RX ADMIN — PAMIDRONATE DISODIUM 64.17 MILLIGRAM(S): 9 INJECTION, SOLUTION INTRAVENOUS at 21:36

## 2019-04-05 RX ADMIN — Medication 1: at 13:13

## 2019-04-05 RX ADMIN — Medication 650 MILLIGRAM(S): at 22:25

## 2019-04-05 RX ADMIN — Medication 650 MILLIGRAM(S): at 21:34

## 2019-04-05 RX ADMIN — Medication 650 MILLIGRAM(S): at 10:28

## 2019-04-05 RX ADMIN — Medication 650 MILLIGRAM(S): at 11:00

## 2019-04-05 NOTE — CHART NOTE - NSCHARTNOTEFT_GEN_A_CORE
Notified by RN patient with another episode of clots passing in urine.   Examined patient at bedside, who appears in no apparent distress.   Urinal at bedside with pink-tinged urine as well as two dime-sized clots.   Patient denies any abdominal pain, nausea/vomitting, dizziness, trouble urinating/dysuria or any other symptoms.   He states this has occurred in the past very long ago.   STAT CBC, all anticoagulants/antiplatelets on hold, venodynes ordered for now.   Vital signs stable.   Will continue to monitor.  MACY Wolfe

## 2019-04-05 NOTE — CHART NOTE - NSCHARTNOTEFT_GEN_A_CORE
Notified by RN patient with an episode of small clots in urine ( less than a teaspoon).   Urine was flushed therefore, author was unable to visualize the clots.   Pt denies any current complaints.   Repeat vitals, monitor. If patient has another episode will do stat CBC and consider transfusion.     - ADS MACY Wolfe

## 2019-04-05 NOTE — PROGRESS NOTE ADULT - ASSESSMENT
69 yo M with relatively recently diagnosed Metastatic clear cell RCC to the brain and lungs, on chemo(Nivolumab/ipilimumab x3 cycles) s/p gammaknife presents from [facility] with moderate hypercalcemia s/p IVF from 12.4 to 12.1 planned for dose of pamidronate.   Also with some mild clots in urine overnight, visualized urine today and clear, likely due to known RCC as denies any urinary symptoms.

## 2019-04-05 NOTE — PROGRESS NOTE ADULT - PROBLEM SELECTOR PLAN 2
hypotonic hyponatremia, likely 2/2 poor oral intake vs chemo induced(nivolumab/ anti PD1) vs due to lung pathology; very mild   - cont IV hydration as above  - regular diet

## 2019-04-05 NOTE — PROGRESS NOTE ADULT - SUBJECTIVE AND OBJECTIVE BOX
INTERVAL HPI/OVERNIGHT EVENTS:  Patient seen at bedside.      VITAL SIGNS:  T(F): 98.4 (04-05-19 @ 13:50)  HR: 95 (04-05-19 @ 13:50)  BP: 119/60 (04-05-19 @ 13:50)  RR: 18 (04-05-19 @ 13:50)  SpO2: 98% (04-05-19 @ 13:50)  Wt(kg): --    PHYSICAL EXAM:    Constitutional: NAD, resting in bed comfortably  Eyes: EOMI, sclera non-icteric  Neck: supple, no LAP  Respiratory: CTA b/l, good air entry b/l, no wheezing, rhonchi or crackels  Cardiovascular: RRR, normal S1S2, no M/R/G  Gastrointestinal: soft, NTND  Extremities: no edema  Neurological: AAOx3, non focal  Skin: Normal temperature    MEDICATIONS  (STANDING):  dextrose 50% Injectable 12.5 Gram(s) IV Push once  dextrose 50% Injectable 25 Gram(s) IV Push once  insulin lispro (HumaLOG) corrective regimen sliding scale   SubCutaneous three times a day before meals  insulin lispro (HumaLOG) corrective regimen sliding scale   SubCutaneous at bedtime  pamidronate IVPB 60 milliGRAM(s) IV Intermittent once  sodium chloride 0.9%. 1000 milliLiter(s) (100 mL/Hr) IV Continuous <Continuous>    MEDICATIONS  (PRN):  acetaminophen   Tablet .. 650 milliGRAM(s) Oral every 6 hours PRN Temp greater or equal to 38C (100.4F), Mild Pain (1 - 3), Moderate Pain (4 - 6), Severe Pain (7 - 10)  dextrose 40% Gel 15 Gram(s) Oral once PRN Blood Glucose LESS THAN 70 milliGRAM(s)/deciliter  famotidine    Tablet 20 milliGRAM(s) Oral two times a day PRN GERD  glucagon  Injectable 1 milliGRAM(s) IntraMuscular once PRN Glucose LESS THAN 70 milligrams/deciliter      Allergies    No Known Allergies    Intolerances        LABS:                        7.1    11.04 )-----------( 521      ( 05 Apr 2019 10:18 )             25.9     04-05    134<L>  |  101  |  12  ----------------------------<  97  4.0   |  22  |  0.84    Ca    10.9<H>      05 Apr 2019 02:45  Phos  2.4     04-04  Mg     1.8     04-04    TPro  7.0  /  Alb  2.5<L>  /  TBili  0.4  /  DBili  x   /  AST  20  /  ALT  20  /  AlkPhos  208<H>  04-05          RADIOLOGY & ADDITIONAL TESTS:  Studies reviewed.

## 2019-04-05 NOTE — PROGRESS NOTE ADULT - SUBJECTIVE AND OBJECTIVE BOX
Patient is a 68y old  Male who presents with a chief complaint of hypercalcemia    SUBJECTIVE / OVERNIGHT EVENTS:    No CP, SOB, f/c/n/v  Remains with chronic R sided scapular pain since dx, this is unchanged and relieved by Tylenol at home  Reports clots in urine have resolved, had not been having that at home  lives alone, can walk well without assistive device  No BM, no melena   would like to go home     MEDICATIONS  (STANDING):  dextrose 50% Injectable 12.5 Gram(s) IV Push once  dextrose 50% Injectable 25 Gram(s) IV Push once  insulin lispro (HumaLOG) corrective regimen sliding scale   SubCutaneous three times a day before meals  insulin lispro (HumaLOG) corrective regimen sliding scale   SubCutaneous at bedtime  pamidronate IVPB 60 milliGRAM(s) IV Intermittent once  sodium chloride 0.9%. 1000 milliLiter(s) (100 mL/Hr) IV Continuous <Continuous>    MEDICATIONS  (PRN):  acetaminophen   Tablet .. 650 milliGRAM(s) Oral every 6 hours PRN Temp greater or equal to 38C (100.4F), Mild Pain (1 - 3), Moderate Pain (4 - 6), Severe Pain (7 - 10)  dextrose 40% Gel 15 Gram(s) Oral once PRN Blood Glucose LESS THAN 70 milliGRAM(s)/deciliter  famotidine    Tablet 20 milliGRAM(s) Oral two times a day PRN GERD  glucagon  Injectable 1 milliGRAM(s) IntraMuscular once PRN Glucose LESS THAN 70 milligrams/deciliter    T(C): 36.9 (04-05-19 @ 13:50), Max: 36.9 (04-05-19 @ 05:25)  HR: 95 (04-05-19 @ 13:50) (80 - 116)  BP: 119/60 (04-05-19 @ 13:50) (100/55 - 134/47)  RR: 18 (04-05-19 @ 13:50) (16 - 18)  SpO2: 98% (04-05-19 @ 13:50) (95% - 100%)    CAPILLARY BLOOD GLUCOSE  POCT Blood Glucose.: 169 mg/dL (05 Apr 2019 12:41)    I&O's Summary    04 Apr 2019 07:01  -  05 Apr 2019 07:00  --------------------------------------------------------  IN: 0 mL / OUT: 900 mL / NET: -900 mL    05 Apr 2019 07:01  -  05 Apr 2019 14:41  --------------------------------------------------------  IN: 0 mL / OUT: 900 mL / NET: -900 mL    PHYSICAL EXAM:  GENERAL: NAD, well-developed  CHEST/LUNG: Clear to auscultation bilaterally; No wheeze  HEART: Regular rate and rhythm; No murmurs, rubs, or gallops  ABDOMEN: Soft, Nontender, Nondistended; Bowel sounds present  EXTREMITIES:   warm and well perfused, No clubbing, cyanosis, or edema  PSYCH: AAOx3  NEUROLOGY: non-focal  SKIN: No rashes or lesions    LABS:                        7.1    11.04 )-----------( 521      ( 05 Apr 2019 10:18 )             25.9     04-05    134<L>  |  101  |  12  ----------------------------<  97  4.0   |  22  |  0.84    Ca    10.9<H>      05 Apr 2019 02:45  Phos  2.4     04-04  Mg     1.8     04-04    TPro  7.0  /  Alb  2.5<L>  /  TBili  0.4  /  DBili  x   /  AST  20  /  ALT  20  /  AlkPhos  208<H>  04-05      Consultant(s) Notes Reviewed:  oncology   Care Discussed with Consultants/Other Providers: oncology

## 2019-04-05 NOTE — PROGRESS NOTE ADULT - ASSESSMENT
68m with metastatic RCC, mets to lung, brain on immunotherapy with ipilimuman/nivolumab, found to have hypercalcemia and hyponatremia and was referred to ED.   Ca improved slightly s/p IV hydration.   Patient reports hematuria.     -IV hydration with NS, monitor BMP  -Agree with pamidronate   -active type and screen, transfuse 1 u prbc  -Bowel regimen  -Hold LMWH   -outpt oncology f/u    Will follow. Please do not hesitate to call back with questions.     Suyapa Dietz MD  Medical Oncology Attending

## 2019-04-05 NOTE — PROGRESS NOTE ADULT - PROBLEM SELECTOR PLAN 3
Hg at 7.8, likely 2/2 malignancy vs chemo induced (ipilimumab/ monoclonal ab); few clots in urine unlikely source of drop, no melena, likely dilutional 2/2 aggressive hydration for hypercalcemia   - trend CBC  - monitor for bleeding  - will transfuse 1 unit today

## 2019-04-05 NOTE — PROGRESS NOTE ADULT - PROBLEM SELECTOR PLAN 1
likely 2/2 malignancy; PTH appropriately suppressed at 12  - corrected Ca 12.4-->12.1 s/p IVF  - c/w NS at 100 cc/hr   - pamidronate 60 mg x 1, d/w onc   - largely asymptomatic, no abdominal pain, melena, mild constipation   - will need to d/w oncology if OK for dc s/p dosing as effects can take 4 days and patient wants to go home

## 2019-04-06 VITALS
OXYGEN SATURATION: 100 % | SYSTOLIC BLOOD PRESSURE: 135 MMHG | RESPIRATION RATE: 18 BRPM | HEART RATE: 98 BPM | TEMPERATURE: 98 F | DIASTOLIC BLOOD PRESSURE: 72 MMHG

## 2019-04-06 LAB
ANION GAP SERPL CALC-SCNC: 12 MMO/L — SIGNIFICANT CHANGE UP (ref 7–14)
BUN SERPL-MCNC: 9 MG/DL — SIGNIFICANT CHANGE UP (ref 7–23)
CA-I BLD-SCNC: 1.45 MMOL/L — HIGH (ref 1.03–1.23)
CALCIUM SERPL-MCNC: 11.8 MG/DL — HIGH (ref 8.4–10.5)
CHLORIDE SERPL-SCNC: 101 MMOL/L — SIGNIFICANT CHANGE UP (ref 98–107)
CO2 SERPL-SCNC: 22 MMOL/L — SIGNIFICANT CHANGE UP (ref 22–31)
CREAT SERPL-MCNC: 0.78 MG/DL — SIGNIFICANT CHANGE UP (ref 0.5–1.3)
GLUCOSE SERPL-MCNC: 94 MG/DL — SIGNIFICANT CHANGE UP (ref 70–99)
HCT VFR BLD CALC: 28.5 % — LOW (ref 39–50)
HGB BLD-MCNC: 8.1 G/DL — LOW (ref 13–17)
MAGNESIUM SERPL-MCNC: 1.9 MG/DL — SIGNIFICANT CHANGE UP (ref 1.6–2.6)
MCHC RBC-ENTMCNC: 22.4 PG — LOW (ref 27–34)
MCHC RBC-ENTMCNC: 28.4 % — LOW (ref 32–36)
MCV RBC AUTO: 78.7 FL — LOW (ref 80–100)
NRBC # FLD: 0 K/UL — SIGNIFICANT CHANGE UP (ref 0–0)
PHOSPHATE SERPL-MCNC: 3.1 MG/DL — SIGNIFICANT CHANGE UP (ref 2.5–4.5)
PLATELET # BLD AUTO: 521 K/UL — HIGH (ref 150–400)
PMV BLD: 8.9 FL — SIGNIFICANT CHANGE UP (ref 7–13)
POTASSIUM SERPL-MCNC: 3.9 MMOL/L — SIGNIFICANT CHANGE UP (ref 3.5–5.3)
POTASSIUM SERPL-SCNC: 3.9 MMOL/L — SIGNIFICANT CHANGE UP (ref 3.5–5.3)
RBC # BLD: 3.62 M/UL — LOW (ref 4.2–5.8)
RBC # FLD: 18.4 % — HIGH (ref 10.3–14.5)
SODIUM SERPL-SCNC: 135 MMOL/L — SIGNIFICANT CHANGE UP (ref 135–145)
WBC # BLD: 9.55 K/UL — SIGNIFICANT CHANGE UP (ref 3.8–10.5)
WBC # FLD AUTO: 9.55 K/UL — SIGNIFICANT CHANGE UP (ref 3.8–10.5)

## 2019-04-06 PROCEDURE — 99239 HOSP IP/OBS DSCHRG MGMT >30: CPT

## 2019-04-06 RX ADMIN — Medication 650 MILLIGRAM(S): at 08:28

## 2019-04-06 RX ADMIN — SODIUM CHLORIDE 100 MILLILITER(S): 9 INJECTION INTRAMUSCULAR; INTRAVENOUS; SUBCUTANEOUS at 08:29

## 2019-04-06 RX ADMIN — Medication 1: at 12:59

## 2019-04-06 RX ADMIN — Medication 650 MILLIGRAM(S): at 09:15

## 2019-04-06 NOTE — PROGRESS NOTE ADULT - SUBJECTIVE AND OBJECTIVE BOX
Patient is a 68y old  Male who presents with a chief complaint of hypercalcemia    Patient seen and examined at the bedside   patient does not want to stay in the hospital  Try to explain that he still have an elevated calcium he required IV fluids he stated that he will sign AMA but he would not stay 1 more hours in the hospital.  Patient admitted for the hypercalcemia status post pamidronate.  Labs reviewed  Still have calcium of 11.8  Hemoglobin of 8.1  Ionized  calcium 1.45      MEDICATIONS  (STANDING):  dextrose 50% Injectable 12.5 Gram(s) IV Push once  dextrose 50% Injectable 25 Gram(s) IV Push once  insulin lispro (HumaLOG) corrective regimen sliding scale   SubCutaneous three times a day before meals  insulin lispro (HumaLOG) corrective regimen sliding scale   SubCutaneous at bedtime  sodium chloride 0.9%. 1000 milliLiter(s) (100 mL/Hr) IV Continuous <Continuous>    MEDICATIONS  (PRN):  acetaminophen   Tablet .. 650 milliGRAM(s) Oral every 6 hours PRN Temp greater or equal to 38C (100.4F), Mild Pain (1 - 3), Moderate Pain (4 - 6), Severe Pain (7 - 10)  dextrose 40% Gel 15 Gram(s) Oral once PRN Blood Glucose LESS THAN 70 milliGRAM(s)/deciliter  famotidine    Tablet 20 milliGRAM(s) Oral two times a day PRN GERD  glucagon  Injectable 1 milliGRAM(s) IntraMuscular once PRN Glucose LESS THAN 70 milligrams/deciliter    Vital Signs Last 24 Hrs  T(C): 36.7 (06 Apr 2019 12:35), Max: 37.1 (05 Apr 2019 21:25)  T(F): 98.1 (06 Apr 2019 12:35), Max: 98.7 (05 Apr 2019 21:25)  HR: 98 (06 Apr 2019 12:35) (95 - 109)  BP: 135/72 (06 Apr 2019 12:35) (119/60 - 139/58)  BP(mean): --  RR: 18 (06 Apr 2019 12:35) (17 - 18)  SpO2: 100% (06 Apr 2019 12:35) (93% - 100%)      PHYSICAL EXAM:  GENERAL: NAD, well-developed  CHEST/LUNG: Clear to auscultation bilaterally; No wheeze  HEART: Regular rate and rhythm; No murmurs, rubs, or gallops  ABDOMEN: Soft, Nontender, Nondistended; Bowel sounds present  EXTREMITIES:   warm and well perfused, No clubbing, cyanosis, or edema  PSYCH: AAOx3  NEUROLOGY: non-focal  SKIN: No rashes or lesions    LABS:                                       8.1    9.55  )-----------( 521      ( 06 Apr 2019 05:10 )             28.5       04-06    135  |  101  |  9   ----------------------------<  94  3.9   |  22  |  0.78    Ca    11.8<H>      06 Apr 2019 05:10  Phos  3.1     04-06  Mg     1.9     04-06    TPro  7.0  /  Alb  2.5<L>  /  TBili  0.4  /  DBili  x   /  AST  20  /  ALT  20  /  AlkPhos  208<H>  04-05        Consultant(s) Notes Reviewed:  oncology   Care Discussed with Consultants/Other Providers: oncology

## 2019-04-06 NOTE — DISCHARGE NOTE PROVIDER - CARE PROVIDERS DIRECT ADDRESSES
[email or web address]

## 2019-04-06 NOTE — PROGRESS NOTE ADULT - PROBLEM SELECTOR PLAN 3
Hg at 7.8, likely 2/2 malignancy vs chemo induced (ipilimumab/ monoclonal ab); few clots in urine unlikely source of drop, no melena, likely dilutional 2/2 aggressive hydration for hypercalcemia   - trend CBC  - monitor for bleeding  Status post 1 unit transfuse

## 2019-04-06 NOTE — DISCHARGE NOTE PROVIDER - NSDCCPCAREPLAN_GEN_ALL_CORE_FT
PRINCIPAL DISCHARGE DIAGNOSIS  Diagnosis: Hypercalcemia  Assessment and Plan of Treatment: - likely 2/2 malignancy vs chemo induced  - PTH- 12.85 low, GGT- 123, s/p IVF  - 4/5 per Oncology recommendation administered Aredia 60mg IV x1   - Follow up with Onc & PCP to check your labs      SECONDARY DISCHARGE DIAGNOSES  Diagnosis: Hyponatremia  Assessment and Plan of Treatment: - hypotonic hyponatremia  - likely 2/2 poor oral intake vs chemo induced(nivolumab/ anti PD1)  - Resolved with IV fluids  - Follow up with Onc & PCP to check your labs    Diagnosis: Renal cell carcinoma of left kidney metastatic to other site  Assessment and Plan of Treatment: - on Nivolumab /ipilimumab s/p 3 cycles and s/p gamma knife   - Heme/onc consulted in the hospital  - recommended Bowel regimen, transfuse for H/H <7.0  - outpt Oncology follow up      Diagnosis: Anemia, unspecified type  Assessment and Plan of Treatment: - s/p 1 unit of blood on 4/5/19  - Follow up with PCP / Onc as outpatient

## 2019-04-06 NOTE — DISCHARGE NOTE PROVIDER - CARE PROVIDER_API CALL
Billy Hastings)  Internal Medicine  [address] [address] [place] [id number]  Phone: [phone]  Fax: [phone]  Follow Up Time:     Bart Calderón; MBBS)  Hematology; [facility] Medicine; Medical Oncology  [address] [place] [id number]  Phone: [phone]  Fax: [phone]  Follow Up Time:

## 2019-04-06 NOTE — PROGRESS NOTE ADULT - ASSESSMENT
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   Also with some mild clots in urine overnight, visualized urine today and clear, likely due to known RCC as denies any urinary symptoms.

## 2019-04-06 NOTE — PROGRESS NOTE ADULT - PROBLEM SELECTOR PLAN 1
likely 2/2 malignancy; PTH appropriately suppressed at 12  pamidronate 60 mg x 1, d/w onc   Continue IV fluids

## 2019-04-06 NOTE — DISCHARGE NOTE PROVIDER - NSDCCAREPROVSEEN_GEN_ALL_CORE_FT
Selin Fernandez  [facility] H[facility]ology Oncology, Team  [facility], ADS

## 2019-04-07 NOTE — ASSESSMENT
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 Based on [facility] risk prediction he has leukocytosis, anemia, thrombocytosis and hypercalcemia, time to initiated treatment less than one year, 5/6 factors. He has poor risk mccRCC.  The standard of care is combined immunotherapy nivo and ipi based on CHECKMATE 214 trial with significant survival benefit as compared to sunitinib group. I also mentioned to the patient cabozantinib based on [place] trial with PFS and CARTY benefit as compared with sunitinib.S/p cycle 1 nivo and ipi.  The potential AEs are including but not limited to skin rashes, arthralgia, myositis, colitis, hepatitis, nephritis, pneumonitis, thyroiditis, hypophysitis. S/p gamma knife for his brain lesion. He has fatigue, decreased appetite now. s/p 2 cycles of nivo and ipi. \par \par Plan\par continue cycle 4 nivo and ipi for his mRCC\par check labs\par follow up with Dr. Rivers after gamma knife\par RTC in 3 weeks with CT torso. \par \par Shaji Mcgill, MSN, ANP-BC

## 2019-04-07 NOTE — HISTORY OF PRESENT ILLNESS
[Disease: _____________________] : Disease: [unfilled] [T: ___] : T[unfilled] [N: ___] : N[unfilled] [M: ___] : M[unfilled] [AJCC Stage: ____] : AJCC Stage: [unfilled] [de-identified] : Pt is a 68 year old man w/ a PMHx GERD, smoking presenting w/ progressive back pain x4 months, subjective fever and cough/sputum, shortness of breath, was hospitalized to [facility]. He states that the back pain is dull, intermittent, and located inferior to his right scapula, and occasional radiated to the side of his chest. Pt states that the pain has gotten worse over the past few months, and it worsens with movement. It frequently awakens him from sleep, but improves with two Advil. Pt also endorses two months of an intermittent cough productive of whitish sputum. Pt also states 30lbs weight loss in last 6 months. He becomes sob on exertion. Pt states he also has a decreased appetite. He also has occasional subjective fevers/chills.  In ED, he was found to have  Alk phos 321, Ca 11.5. CXR showed possible RUL PNA and multiple opacities. CT chest: RUL and LLL lesion, innumerable b/l opacities, hilar lymphadenopathy, b/l adrenal mets. Pt admitted to medical floors for further management and w/u. CT A/P showed an 11.1 cm necrotic mass inseparable from the lower pole of the left kidney; para-aortic lymphadenopathy. Pt followed by oncology team, urology team. Pt w/ decreasing H/H during hospitalization, requiring 1U pRBCs. Started on iron supplementation. Pt had brain MRI which showed left sided frontoparietal metastasis with associated edema. Pt started on decadron 4 mg q6. Neurosurgery recommended outpt SRS. Radiation oncology recommended gamma knife as an outpt. Pt underwent IR biopsy of his lung. \par  [de-identified] : clear cell renal cell carcinoma [de-identified] : S/P cycle 2 nivo and ipi and s/p gamma knife for his brain lesion with Dr. Rivers.\par He feels tired.  His appetite is reduced. His energy level is decreased too. His upper back pain is much better, 2/10.   He denies diarrhea, skin rashes. \par \par 4/3/19 : Here for a follow up. CYcle 4 of ipi + nivo is on 4/10/19. No change in AEs since his last visit. Some fatigue, Gr 1, pain stable at 2/10. Ne headache, sob, cp, NVD.

## 2019-04-10 ENCOUNTER — LABORATORY RESULT (OUTPATIENT)
Age: 69
End: 2019-04-10

## 2019-04-10 ENCOUNTER — APPOINTMENT (OUTPATIENT)
Dept: INFUSION THERAPY | Facility: HOSPITAL | Age: 69
End: 2019-04-10

## 2019-04-11 ENCOUNTER — EMERGENCY (EMERGENCY)
Facility: HOSPITAL | Age: 69
LOS: 1 days | Discharge: HOME | End: 2019-04-11
Attending: EMERGENCY MEDICINE | Admitting: EMERGENCY MEDICINE
Payer: COMMERCIAL

## 2019-04-11 VITALS
TEMPERATURE: 98 F | OXYGEN SATURATION: 100 % | DIASTOLIC BLOOD PRESSURE: 75 MMHG | RESPIRATION RATE: 18 BRPM | HEART RATE: 114 BPM | SYSTOLIC BLOOD PRESSURE: 132 MMHG

## 2019-04-11 DIAGNOSIS — Z51.11 ENCOUNTER FOR ANTINEOPLASTIC CHEMOTHERAPY: ICD-10-CM

## 2019-04-11 DIAGNOSIS — Z90.49 ACQUIRED ABSENCE OF OTHER SPECIFIED PARTS OF DIGESTIVE TRACT: Chronic | ICD-10-CM

## 2019-04-11 LAB
ALBUMIN SERPL ELPH-MCNC: 3.1 G/DL — LOW (ref 3.3–5)
ALP SERPL-CCNC: 217 U/L — HIGH (ref 40–120)
ALT FLD-CCNC: 11 U/L — SIGNIFICANT CHANGE UP (ref 4–41)
ANION GAP SERPL CALC-SCNC: 17 MMO/L — HIGH (ref 7–14)
APPEARANCE UR: SIGNIFICANT CHANGE UP
APTT BLD: 35.8 SEC — SIGNIFICANT CHANGE UP (ref 27.5–36.3)
AST SERPL-CCNC: 13 U/L — SIGNIFICANT CHANGE UP (ref 4–40)
BACTERIA # UR AUTO: SIGNIFICANT CHANGE UP
BASOPHILS # BLD AUTO: 0.06 K/UL — SIGNIFICANT CHANGE UP (ref 0–0.2)
BASOPHILS # BLD AUTO: 0.06 K/UL — SIGNIFICANT CHANGE UP (ref 0–0.2)
BASOPHILS NFR BLD AUTO: 0.5 % — SIGNIFICANT CHANGE UP (ref 0–2)
BASOPHILS NFR BLD AUTO: 0.6 % — SIGNIFICANT CHANGE UP (ref 0–2)
BILIRUB SERPL-MCNC: 0.6 MG/DL — SIGNIFICANT CHANGE UP (ref 0.2–1.2)
BILIRUB UR-MCNC: NEGATIVE — SIGNIFICANT CHANGE UP
BLD GP AB SCN SERPL QL: NEGATIVE — SIGNIFICANT CHANGE UP
BLOOD UR QL VISUAL: SIGNIFICANT CHANGE UP
BUN SERPL-MCNC: 14 MG/DL — SIGNIFICANT CHANGE UP (ref 7–23)
CALCIUM SERPL-MCNC: 8.9 MG/DL — SIGNIFICANT CHANGE UP (ref 8.4–10.5)
CHLORIDE SERPL-SCNC: 95 MMOL/L — LOW (ref 98–107)
CO2 SERPL-SCNC: 20 MMOL/L — LOW (ref 22–31)
COLOR SPEC: SIGNIFICANT CHANGE UP
CREAT SERPL-MCNC: 0.8 MG/DL — SIGNIFICANT CHANGE UP (ref 0.5–1.3)
EOSINOPHIL # BLD AUTO: 0.26 K/UL — SIGNIFICANT CHANGE UP (ref 0–0.5)
EOSINOPHIL # BLD AUTO: 0.27 K/UL — SIGNIFICANT CHANGE UP (ref 0–0.5)
EOSINOPHIL NFR BLD AUTO: 2.4 % — SIGNIFICANT CHANGE UP (ref 0–6)
EOSINOPHIL NFR BLD AUTO: 2.8 % — SIGNIFICANT CHANGE UP (ref 0–6)
GLUCOSE SERPL-MCNC: 136 MG/DL — HIGH (ref 70–99)
GLUCOSE UR-MCNC: NEGATIVE — SIGNIFICANT CHANGE UP
HBA1C BLD-MCNC: 6.3 % — HIGH (ref 4–5.6)
HCT VFR BLD CALC: 25.2 % — LOW (ref 39–50)
HCT VFR BLD CALC: 29.2 % — LOW (ref 39–50)
HGB BLD-MCNC: 7.3 G/DL — LOW (ref 13–17)
HGB BLD-MCNC: 8.2 G/DL — LOW (ref 13–17)
IMM GRANULOCYTES NFR BLD AUTO: 0.5 % — SIGNIFICANT CHANGE UP (ref 0–1.5)
IMM GRANULOCYTES NFR BLD AUTO: 0.5 % — SIGNIFICANT CHANGE UP (ref 0–1.5)
INR BLD: 1.35 — HIGH (ref 0.88–1.17)
KETONES UR-MCNC: NEGATIVE — SIGNIFICANT CHANGE UP
LEUKOCYTE ESTERASE UR-ACNC: SIGNIFICANT CHANGE UP
LYMPHOCYTES # BLD AUTO: 2.15 K/UL — SIGNIFICANT CHANGE UP (ref 1–3.3)
LYMPHOCYTES # BLD AUTO: 2.22 K/UL — SIGNIFICANT CHANGE UP (ref 1–3.3)
LYMPHOCYTES # BLD AUTO: 20.3 % — SIGNIFICANT CHANGE UP (ref 13–44)
LYMPHOCYTES # BLD AUTO: 22.6 % — SIGNIFICANT CHANGE UP (ref 13–44)
MCHC RBC-ENTMCNC: 22 PG — LOW (ref 27–34)
MCHC RBC-ENTMCNC: 22.6 PG — LOW (ref 27–34)
MCHC RBC-ENTMCNC: 28.1 % — LOW (ref 32–36)
MCHC RBC-ENTMCNC: 29 % — LOW (ref 32–36)
MCV RBC AUTO: 78 FL — LOW (ref 80–100)
MCV RBC AUTO: 78.3 FL — LOW (ref 80–100)
MONOCYTES # BLD AUTO: 0.95 K/UL — HIGH (ref 0–0.9)
MONOCYTES # BLD AUTO: 1.1 K/UL — HIGH (ref 0–0.9)
MONOCYTES NFR BLD AUTO: 10 % — SIGNIFICANT CHANGE UP (ref 2–14)
MONOCYTES NFR BLD AUTO: 10 % — SIGNIFICANT CHANGE UP (ref 2–14)
NEUTROPHILS # BLD AUTO: 6.03 K/UL — SIGNIFICANT CHANGE UP (ref 1.8–7.4)
NEUTROPHILS # BLD AUTO: 7.26 K/UL — SIGNIFICANT CHANGE UP (ref 1.8–7.4)
NEUTROPHILS NFR BLD AUTO: 63.5 % — SIGNIFICANT CHANGE UP (ref 43–77)
NEUTROPHILS NFR BLD AUTO: 66.3 % — SIGNIFICANT CHANGE UP (ref 43–77)
NITRITE UR-MCNC: NEGATIVE — SIGNIFICANT CHANGE UP
NRBC # FLD: 0 K/UL — SIGNIFICANT CHANGE UP (ref 0–0)
NRBC # FLD: 0 K/UL — SIGNIFICANT CHANGE UP (ref 0–0)
PH UR: 6.5 — SIGNIFICANT CHANGE UP (ref 5–8)
PLATELET # BLD AUTO: 478 K/UL — HIGH (ref 150–400)
PLATELET # BLD AUTO: 555 K/UL — HIGH (ref 150–400)
PMV BLD: 8.6 FL — SIGNIFICANT CHANGE UP (ref 7–13)
PMV BLD: 8.6 FL — SIGNIFICANT CHANGE UP (ref 7–13)
POTASSIUM SERPL-MCNC: 3.8 MMOL/L — SIGNIFICANT CHANGE UP (ref 3.5–5.3)
POTASSIUM SERPL-SCNC: 3.8 MMOL/L — SIGNIFICANT CHANGE UP (ref 3.5–5.3)
PROT SERPL-MCNC: 8.1 G/DL — SIGNIFICANT CHANGE UP (ref 6–8.3)
PROT UR-MCNC: 300 — HIGH
PROTHROM AB SERPL-ACNC: 15.1 SEC — HIGH (ref 9.8–13.1)
RBC # BLD: 3.23 M/UL — LOW (ref 4.2–5.8)
RBC # BLD: 3.73 M/UL — LOW (ref 4.2–5.8)
RBC # FLD: 19.3 % — HIGH (ref 10.3–14.5)
RBC # FLD: 19.4 % — HIGH (ref 10.3–14.5)
RBC CASTS # UR COMP ASSIST: >50 — HIGH (ref 0–?)
RH IG SCN BLD-IMP: POSITIVE — SIGNIFICANT CHANGE UP
SODIUM SERPL-SCNC: 132 MMOL/L — LOW (ref 135–145)
SP GR SPEC: 1.02 — SIGNIFICANT CHANGE UP (ref 1–1.04)
UROBILINOGEN FLD QL: NORMAL — SIGNIFICANT CHANGE UP
WBC # BLD: 10.95 K/UL — HIGH (ref 3.8–10.5)
WBC # BLD: 9.51 K/UL — SIGNIFICANT CHANGE UP (ref 3.8–10.5)
WBC # FLD AUTO: 10.95 K/UL — HIGH (ref 3.8–10.5)
WBC # FLD AUTO: 9.51 K/UL — SIGNIFICANT CHANGE UP (ref 3.8–10.5)
WBC UR QL: SIGNIFICANT CHANGE UP (ref 0–?)

## 2019-04-11 PROCEDURE — 74177 CT ABD & PELVIS W/CONTRAST: CPT | Mod: 26

## 2019-04-11 PROCEDURE — 71260 CT THORAX DX C+: CPT | Mod: 26

## 2019-04-11 PROCEDURE — 99220: CPT

## 2019-04-11 NOTE — ED PROVIDER NOTE - CCCP TRG CHIEF CMPLNT
Detail Level: Zone Note Text (......Xxx Chief Complaint.): This diagnosis correlates with the acne Other (Free Text): Pt uses Vaseline and also request refill TMC cream qd prn. urinary retention

## 2019-04-11 NOTE — ED CDU PROVIDER INITIAL DAY NOTE - ATTENDING CONTRIBUTION TO CARE
Patient is a 69 yo M with history of metastatic RCC (to brain and lungs) on chemo (Nivolumab/ipilimumab x3 cycles), s/p gammaknife radiosurgery here for complaint of hematuria and urinary retention. Symptoms started last night. Denies prior episodes. He is not on any blood thinners. Patient noted small clots. Denies dizziness, shortness of breath, palpitations. No chest pain or shortness of breath. Patient is anemic at baseline, last transfused about 2 weeks ago. He was found to have a Hgb of 7.3, down from 8.2 earlier this morning. He had a card placed that showed dark blood in the bag. He was evaluated by urology who did not recommend any CBI. Patient is a 67 yo M with history of metastatic RCC (to brain and lungs) on chemo (Nivolumab/ipilimumab x3 cycles), s/p gamma knife radiosurgery here for complaint of hematuria and urinary retention. Symptoms started last night. Denies prior episodes. He is not on any blood thinners. Patient noted small clots. Denies dizziness, shortness of breath, palpitations. No chest pain or shortness of breath. Patient is anemic at baseline, last transfused about 2 weeks ago. He was found to have a Hgb of 7.3, down from 8.2 earlier this morning. He had a card placed that showed dark blood in the bag. He was evaluated by urology who did not recommend any CBI. They felt hematuria etiology likely from tumor cell death vs bleeding from the renal mass itself vs nephritis induced from the immunotherapy.  Patient seen by hem/onc who recommend CT Chest and A/P to further evaluate for bleeding mass.    VS noted  Gen. chronically ill, non-toxic  HEENT: EOMI, mmm  Lungs: CTAB/L no C/ W /R   CVS: RRR   Abd; Soft non tender, non distended, dark red blood in bag  Ext: no edema  Skin: no rash  Neuro AAOx3 non focal clear speech  a/p: hematuria and anemia in setting of RCC on chemo - patient in CDU for transfusion/ CT Chest/ A/P, repeat labs.  - Linda LANGE

## 2019-04-11 NOTE — ED ADULT NURSE NOTE - NSIMPLEMENTINTERV_GEN_ALL_ED
Implemented All Universal Safety Interventions:  [place] to call system. Call bell, personal items and telephone within reach. Instruct patient to call for assistance. Room bathroom lighting operational. Non-slip footwear when patient is off stretcher. Physically safe environment: no spills, clutter or unnecessary equipment. Stretcher in lowest position, wheels locked, appropriate side rails in place.

## 2019-04-11 NOTE — ED ADULT NURSE NOTE - OBJECTIVE STATEMENT
pt a&ox4, amb, arrives to ED c/o of hematuria. Pt states woke up at 11pm last night to urinate - states painful when trying to urinate, states decreased urinary output, and hematuria. Pt hx of renal and lung CA, currently receiving chemotherapy. Pt states generalized pelvic discomfort but no pain. Pending MD garcia. VS as documented. Safety and comfort maintained. pt a&ox4, amb, arrives to ED c/o of hematuria. Pt states woke up at 11pm last night to urinate - states painful when trying to urinate, states decreased urinary output, and hematuria. Pt hx of renal, brain, and lung CA, currently receiving chemotherapy. Pt states generalized pelvic discomfort but no pain. Pending MD garcia. VS as documented. Safety and comfort maintained.

## 2019-04-11 NOTE — CONSULT NOTE ADULT - SUBJECTIVE AND OBJECTIVE BOX
Patient is a 68y old  Male who presents with a chief complaint of     HPI:  68m with metastatic RCC, mets to lung, brain, s/p gamma knife, on immunotherapy with ipilimuman/nivolumab, s/p C4 4/10, presenting with hematuria and urinary retention. S/p Barone, 1 lit urine drained. Hgb 7.3.  Of note, patient was recently admitted, last week with hypercalcemia and hyponatremia, s/p pamidronate.          ROS: as above     PAST MEDICAL & SURGICAL HISTORY:  Metastatic renal cell carcinoma: to brain and lung s/p gammaknife, 3 cycles nipo/ipi  GERD (gastroesophageal reflux disease)  Appendicitis  History of appendectomy      SOCIAL HISTORY:    FAMILY HISTORY:  No pertinent family history in first degree relatives      MEDICATIONS  (STANDING):    MEDICATIONS  (PRN):      Allergies    No Known Allergies    Intolerances        Vital Signs Last 24 Hrs  T(C): 36.8 (11 Apr 2019 13:10), Max: 36.8 (11 Apr 2019 12:55)  T(F): 98.3 (11 Apr 2019 13:10), Max: 98.3 (11 Apr 2019 12:55)  HR: 103 (11 Apr 2019 13:10) (90 - 114)  BP: 131/63 (11 Apr 2019 13:10) (124/55 - 133/62)  BP(mean): --  RR: 16 (11 Apr 2019 13:10) (14 - 18)  SpO2: 98% (11 Apr 2019 13:10) (98% - 100%)    PHYSICAL EXAM  General: adult in NAD  HEENT: clear oropharynx, anicteric sclera, pink conjunctiva  Neck: supple  CV: normal S1/S2 with no murmur rubs or gallops  Lungs: positive air movement b/l ant lungs, clear to auscultation, no wheezes, no rales  Abdomen: soft non-tender non-distended, no hepatosplenomegaly  Ext: no clubbing cyanosis or edema  Skin: no rashes and no petechiae  Neuro: alert and oriented X 3, none focal    LABS:                          7.3    9.51  )-----------( 478      ( 11 Apr 2019 09:30 )             25.2         Mean Cell Volume : 78.0 fL  Mean Cell Hemoglobin : 22.6 pg  Mean Cell Hemoglobin Concentration : 29.0 %  Auto Neutrophil # : 6.03 K/uL  Auto Lymphocyte # : 2.15 K/uL  Auto Monocyte # : 0.95 K/uL  Auto Eosinophil # : 0.27 K/uL  Auto Basophil # : 0.06 K/uL  Auto Neutrophil % : 63.5 %  Auto Lymphocyte % : 22.6 %  Auto Monocyte % : 10.0 %  Auto Eosinophil % : 2.8 %  Auto Basophil % : 0.6 %      Serial CBC's  04-11 @ 09:30  Hct-25.2 / Hgb-7.3 / Plat-478 / RBC-3.23 / WBC-9.51  Serial CBC's  04-11 @ 05:22  Hct-29.2 / Hgb-8.2 / Plat-555 / RBC-3.73 / WBC-10.95      04-11    132<L>  |  95<L>  |  14  ----------------------------<  136<H>  3.8   |  20<L>  |  0.80    Ca    8.9      11 Apr 2019 05:22    TPro  8.1  /  Alb  3.1<L>  /  TBili  0.6  /  DBili  x   /  AST  13  /  ALT  11  /  AlkPhos  217<H>  04-11      PT/INR - ( 11 Apr 2019 10:30 )   PT: 15.1 SEC;   INR: 1.35          PTT - ( 11 Apr 2019 10:30 )  PTT:35.8 SEC                RADIOLOGY & ADDITIONAL STUDIES:

## 2019-04-11 NOTE — ED CDU PROVIDER INITIAL DAY NOTE - MEDICAL DECISION MAKING DETAILS
69 yo M, former heavy smoker with PMH of metastatic clear cell RCC to the brain and lungs, on chemo (Nivolumab/ipilimumab x3 cycles), s/p gammaknife radiosurgery presents to ER c/o hematuria, urinary retention and difficulty urinating since last night. In ED, Hg dropped from 8.2 to 7.3; seen and evaluated by urology, no CBI required, pending oncology eval; pt sent to CDU for 2 units of pRBC blood transfusion, and trend H/H.

## 2019-04-11 NOTE — ED ADULT NURSE REASSESSMENT NOTE - NS ED NURSE REASSESS COMMENT FT1
16F indwelling Barone placed with DAJUAN Carmona. Sterility maintained. Barone secured. Castro red urine output.

## 2019-04-11 NOTE — CONSULT NOTE ADULT - ASSESSMENT
67 yo M w Hx of GERD, significant smoking history with recently diagnosed Poor Risk Metastatic Clear Cell RCC to the brain and lungs on immunotherapy ipi/nivo presents with 1 day history of hematuria, urinary retention after immunotherapy.  Hematuria etiology likely from tumor cell death vs bleeding from the renal mass itself vs nephritis induced from the immunotherapy.  Hematuria clearing currently with 16f card and small volume flushing.  HCt stable as compared to 4/6 unlikely patient had a real Hct drop given hemodynamic stability and small volume hematuria.  UA relatively benign.     -- No urologic intervention (no CBI indicated)  -- Recommend urine culture  -- Heme/onc c/s  -- Start flomax 0.4 mg once at night  -- May attempt a Trial of void in the morning if stays in house vs follow up as an outpatient for a trial of void at the [facility] [phone] with any attending  -- Monitor Hct

## 2019-04-11 NOTE — ED PROVIDER NOTE - PROGRESS NOTE DETAILS
Pt endorsed to generalized weakness, still having hematuria (bright red, fresh blood). Pyie: Heme/Onc aware - appropriate for CDU with serial H/H. Uro will be on board. No plan to do TBI at this time. Will see the patient.

## 2019-04-11 NOTE — CONSULT NOTE ADULT - SUBJECTIVE AND OBJECTIVE BOX
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  Patient reports received 4th dose of immunotherapy yesterday, went home, began to have hematuria and passage of small clots.  Overnight, hematuria worsened and passed a large clot with some inability to empty his bladder completely and so came in for evaluation.  In the ED, 16f card was placed with 1L of hematuric urine evacuated.  Denies every having issues urinating before.  Has had hematuria in the past couple years that was intermittent and stopped on its own.  No dysuria, flank pain, fevers, chills.    PAST MEDICAL & SURGICAL HISTORY:  Metastatic renal cell carcinoma: to brain and lung s/p gammaknife, 3 cycles nipo/ipi  GERD (gastroesophageal reflux disease)  Appendicitis  History of appendectomy    MEDICATIONS  (STANDING):    MEDICATIONS  (PRN):    FAMILY HISTORY:  No pertinent family history in first degree relatives    Allergies    No Known Allergies    Intolerances    SOCIAL HISTORY:    REVIEW OF SYSTEMS: Otherwise negative as stated in HPI    Physical Exam  Vital signs  T(C): 36.3 (19 @ 05:08), Max: 36.6 (04-10-19 @ 11:30)  HR: 94 (19 @ 06:09)  BP: 133/62 (19 @ 05:08)  SpO2: 100% (19 @ 05:08)  Wt(kg): --    Output    Gen:  NAD    Pulm:  No respiratory distress  	  GI:  soft, NT, ND  Lower midline abdominal scar well healed from past sx for perforated appendicitis    :  Card secured, irrigated with ~ 250 cc of sterile water.  Small clots removed (~ 5cc) with urine clearing to a light pink  No CVAT b/l    LABS:   @ 09:30  WBC 9.51  / Hct 25.2  / SCr --        @ 05:22  WBC 10.95 / Hct 29.2  / SCr 0.80         132<L>  |  95<L>  |  14  ----------------------------<  136<H>  3.8   |  20<L>  |  0.80    Ca    8.9      2019 05:22  TPro  8.1  /  Alb  3.1<L>  /  TBili  0.6  /  DBili  x   /  AST  13  /  ALT  11  /  AlkPhos  217<H>  04-11  PT/INR - ( 2019 10:30 )   PT: 15.1 SEC;   INR: 1.35       PTT - ( 2019 10:30 )  PTT:35.8 SEC    Urinalysis Basic - ( 2019 05:40 )  Color: RED / Appearance: TURBID / S.017 / pH: 6.5  Gluc: NEGATIVE / Ketone: NEGATIVE  / Bili: NEGATIVE / Urobili: NORMAL   Blood: LARGE / Protein: 300 / Nitrite: NEGATIVE   Leuk Esterase: SMALL / RBC: >50 / WBC 5-10   Sq Epi: x / Non Sq Epi: x / Bacteria: FEW    Urine Cx: p

## 2019-04-11 NOTE — ED PROVIDER NOTE - CLINICAL SUMMARY MEDICAL DECISION MAKING FREE TEXT BOX
69yo M pmhx liver ca w/ mets p/w CC hematuria, difficulty urinating. Will send labs ua urine culture place card and reassess. 67yo M pmhx RCC w/ mets p/w CC hematuria, difficulty urinating. Will send labs ua urine culture place card and reassess.

## 2019-04-11 NOTE — ED ADULT NURSE NOTE - PMH
Appendicitis    GERD (gastroesophageal reflux disease)    Metastatic renal cell carcinoma  to brain and lung s/p gammaknife, 3 cycles nipo/ipi

## 2019-04-11 NOTE — ED CDU PROVIDER INITIAL DAY NOTE - PROGRESS NOTE DETAILS
Sign out report received from MACY Ponce, pt is tachycardic 110bpm, otherwise hemodynamically stable, urine output was >2000cc, there was no documentation of the last time card was drained. Pt with inadequate rise in H&H, urology made aware of CT results as well as wesly hematuria that is persistent, pending their recs, likely admission if no acute urologic intervention at this time.

## 2019-04-11 NOTE — CONSULT NOTE ADULT - ASSESSMENT
68m with metastatic RCC, mets to lung, brain, s/p gamma knife, on immunotherapy with ipilimuman/nivolumab, s/p C4 4/10, presenting with hematuria and urinary retention. S/p Barone, 1 lit urine drained. Hgb 7.3.  Hematuria in the setting of RCC, 11cm mass in the lower pole of left kidney.  Hgb 7.3  INR 1.3    -tranfuse1 u prbc  -urology consult  -CT c/a/p with contrast for restaging and to eval hematuria further (last imaging 1/2019 with an 11cm necrotic mass in the lower pole of left kidney, now s/p 4 cycles of immunotherapy)  -If hematuria continues can consider palliative radiation  -Supportive care, pain control, nutrition, PT  -outpatient oncology follow up      Will follow. Please do not hesitate to call back with questions.     Suyapa Dietz MD  Medical Oncology Attending

## 2019-04-11 NOTE — ED ADULT TRIAGE NOTE - CHIEF COMPLAINT QUOTE
Pt. w/ hx. liver CA , last chemo ( yesterday) c/o urinary rentention. Pt. states he has not been able to properly urinate in the past 12 hours. Pt. c/o small streams of urination but states he feels like he is retaining. Pt. appears uncomfortable in triage .

## 2019-04-11 NOTE — ED PROVIDER NOTE - OBJECTIVE STATEMENT
69yo M pmhx metastatic liver ca on chemo p/w CC hematuria and difficulty urinating since last night. States he is only able to urinate a very small amount, notes blood in urine, no clots, denies similar episodes in past. Denies fever chills cp sob n/v/d. 69yo M pmhx metastatic RCC on chemo p/w CC hematuria and difficulty urinating since last night. States he is only able to urinate a very small amount, notes blood in urine, no clots, denies similar episodes in past. Denies fever chills cp sob n/v/d.

## 2019-04-11 NOTE — ED CDU PROVIDER INITIAL DAY NOTE - OBJECTIVE STATEMENT
69 yo M, former heavy smoker with PMH of metastatic clear cell RCC to the brain and lungs, on chemo (Nivolumab/ipilimumab x3 cycles), s/p gammaknife radiosurgery presents to ER c/o hematuria, urinary retention and difficulty urinating since last night. Pt states having difficulty urinating, noted gross blood in urine and small clots. Reports he never had similar symptoms in the past. Denies any fever, chills, n/v/d, abdominal pain, pelvic pain, back pain, injury, or any other complaints.

## 2019-04-11 NOTE — ED PROVIDER NOTE - ATTENDING CONTRIBUTION TO CARE
67 y/o M with h/o metastatic RCC on chemo (last yesterday) here with urinary retention and 69 y/o M with h/o metastatic RCC on chemo (last yesterday) here with urinary retention since 7pm.  Pt reports inability to urinate since then.  Pt reports "dark urine" for the past 7 months, since being diagnosed with RCC but never had difficulty urinating or require catheter in past.  Pt reports recent admission for hypercalcemia.  No fever, chills, vomiting, back pain, abd pain, dysuria, frequency.  Pt does not take any antiplatelet or anticoagulant meds.  Well appearing, lying comfortably in stretcher, awake and alert, nontoxic.  VSS.  Lungs cta bl.  Cards nl S1/S2, RRR, no MRG.  Abd soft ntnd.  No CVA tenderness.  Plan for labs, check renal function, h/h, ua r/o infection.  Card placed, likely 2/2 known rcc, given baseline anemia and gross hematuria, will obtain serial h/h, obs with card, likely home with card but will d/w onc.

## 2019-04-12 VITALS
RESPIRATION RATE: 18 BRPM | HEART RATE: 102 BPM | OXYGEN SATURATION: 99 % | SYSTOLIC BLOOD PRESSURE: 114 MMHG | DIASTOLIC BLOOD PRESSURE: 62 MMHG | TEMPERATURE: 98 F

## 2019-04-12 LAB
ANION GAP SERPL CALC-SCNC: 12 MMO/L — SIGNIFICANT CHANGE UP (ref 7–14)
BACTERIA UR CULT: SIGNIFICANT CHANGE UP
BASOPHILS # BLD AUTO: 0.07 K/UL — SIGNIFICANT CHANGE UP (ref 0–0.2)
BASOPHILS NFR BLD AUTO: 0.7 % — SIGNIFICANT CHANGE UP (ref 0–2)
BUN SERPL-MCNC: 9 MG/DL — SIGNIFICANT CHANGE UP (ref 7–23)
CALCIUM SERPL-MCNC: 8.7 MG/DL — SIGNIFICANT CHANGE UP (ref 8.4–10.5)
CHLORIDE SERPL-SCNC: 103 MMOL/L — SIGNIFICANT CHANGE UP (ref 98–107)
CO2 SERPL-SCNC: 20 MMOL/L — LOW (ref 22–31)
CREAT SERPL-MCNC: 0.77 MG/DL — SIGNIFICANT CHANGE UP (ref 0.5–1.3)
EOSINOPHIL # BLD AUTO: 0.36 K/UL — SIGNIFICANT CHANGE UP (ref 0–0.5)
EOSINOPHIL NFR BLD AUTO: 3.7 % — SIGNIFICANT CHANGE UP (ref 0–6)
GLUCOSE SERPL-MCNC: 114 MG/DL — HIGH (ref 70–99)
HCT VFR BLD CALC: 29.4 % — LOW (ref 39–50)
HCT VFR BLD CALC: 30.6 % — LOW (ref 39–50)
HGB BLD-MCNC: 8.7 G/DL — LOW (ref 13–17)
HGB BLD-MCNC: 9.1 G/DL — LOW (ref 13–17)
IMM GRANULOCYTES NFR BLD AUTO: 0.7 % — SIGNIFICANT CHANGE UP (ref 0–1.5)
LYMPHOCYTES # BLD AUTO: 2.01 K/UL — SIGNIFICANT CHANGE UP (ref 1–3.3)
LYMPHOCYTES # BLD AUTO: 20.5 % — SIGNIFICANT CHANGE UP (ref 13–44)
MCHC RBC-ENTMCNC: 23.5 PG — LOW (ref 27–34)
MCHC RBC-ENTMCNC: 23.6 PG — LOW (ref 27–34)
MCHC RBC-ENTMCNC: 29.6 % — LOW (ref 32–36)
MCHC RBC-ENTMCNC: 29.7 % — LOW (ref 32–36)
MCV RBC AUTO: 79.1 FL — LOW (ref 80–100)
MCV RBC AUTO: 79.7 FL — LOW (ref 80–100)
MONOCYTES # BLD AUTO: 1.21 K/UL — HIGH (ref 0–0.9)
MONOCYTES NFR BLD AUTO: 12.3 % — SIGNIFICANT CHANGE UP (ref 2–14)
NEUTROPHILS # BLD AUTO: 6.1 K/UL — SIGNIFICANT CHANGE UP (ref 1.8–7.4)
NEUTROPHILS NFR BLD AUTO: 62.1 % — SIGNIFICANT CHANGE UP (ref 43–77)
NRBC # FLD: 0 K/UL — SIGNIFICANT CHANGE UP (ref 0–0)
NRBC # FLD: 0 K/UL — SIGNIFICANT CHANGE UP (ref 0–0)
PLATELET # BLD AUTO: 469 K/UL — HIGH (ref 150–400)
PLATELET # BLD AUTO: 531 K/UL — HIGH (ref 150–400)
PMV BLD: 8.6 FL — SIGNIFICANT CHANGE UP (ref 7–13)
PMV BLD: 8.7 FL — SIGNIFICANT CHANGE UP (ref 7–13)
POTASSIUM SERPL-MCNC: 3.7 MMOL/L — SIGNIFICANT CHANGE UP (ref 3.5–5.3)
POTASSIUM SERPL-SCNC: 3.7 MMOL/L — SIGNIFICANT CHANGE UP (ref 3.5–5.3)
RBC # BLD: 3.69 M/UL — LOW (ref 4.2–5.8)
RBC # BLD: 3.87 M/UL — LOW (ref 4.2–5.8)
RBC # FLD: 18.2 % — HIGH (ref 10.3–14.5)
RBC # FLD: 18.3 % — HIGH (ref 10.3–14.5)
SODIUM SERPL-SCNC: 135 MMOL/L — SIGNIFICANT CHANGE UP (ref 135–145)
SPECIMEN SOURCE: SIGNIFICANT CHANGE UP
WBC # BLD: 9.82 K/UL — SIGNIFICANT CHANGE UP (ref 3.8–10.5)
WBC # BLD: 9.88 K/UL — SIGNIFICANT CHANGE UP (ref 3.8–10.5)
WBC # FLD AUTO: 9.82 K/UL — SIGNIFICANT CHANGE UP (ref 3.8–10.5)
WBC # FLD AUTO: 9.88 K/UL — SIGNIFICANT CHANGE UP (ref 3.8–10.5)

## 2019-04-12 PROCEDURE — 99217: CPT

## 2019-04-12 NOTE — ED CDU PROVIDER DISPOSITION NOTE - NSFOLLOWUPINSTRUCTIONS_ED_ALL_ED_FT
Follow up with your Primary Medical Doctor within 2-3days. If results or reports were given to you, show copies of your reports given to you. Take all of your medications as previously prescribed.      Follow up as an outpatient with urology, for a trial of void at the [facility] call [phone], to make an appointment with any urology attending physician.     Follow up as outpatient with Dr. Dietz     IF any passing of clots through the card, increased pain, vomiting, or any other new or concerning symptoms.

## 2019-04-12 NOTE — ED CDU PROVIDER SUBSEQUENT DAY NOTE - HISTORY
CDU Initial Day Note:  67 yo M, former heavy smoker with PMH of metastatic clear cell RCC to the brain and lungs, on chemo (Nivolumab/ipilimumab x3 cycles), s/p gammaknife radiosurgery presents to ER c/o hematuria, urinary retention and difficulty urinating since last night. Pt states having difficulty urinating, noted gross blood in urine and small clots. Reports he never had similar symptoms in the past. Denies any fever, chills, n/v/d, abdominal pain, pelvic pain, back pain, injury, or any other complaints.  CDU Subsequent Day Note: MACY Eduardo, Agree w/ above, pt w/ hematuria, no complaints at this time.

## 2019-04-12 NOTE — ED CDU PROVIDER SUBSEQUENT DAY NOTE - PROGRESS NOTE DETAILS
CDU MACY Ritter: Received signout on patient- seen and examined this morning with attending.  pt with metastatic RCC, cleared by urology for hematuria, no need for CBI, cleared by hemeon. Patient rested comfortably overnight. CDU MACY Ritter: Received signout on patient- seen and examined this morning with attending.  pt with metastatic RCC, cleared by urology for hematuria, no need for CBI, cleared by hemeonc. Patient rested comfortably overnight, feels well this am. Spoke with urology, cleared to go. Spoke with oncology, pt was seen by own attg, as per oncology, will send a text to the attg to reach out to us. Chart reviewed as per attg chart pt cleared to follow up with oncology as out patient.

## 2019-04-12 NOTE — ED CDU PROVIDER SUBSEQUENT DAY NOTE - ATTENDING CONTRIBUTION TO CARE
Dr. De La Cruz:  I performed a face to face bedside interview with patient regarding history of present illness, review of symptoms and past medical history. I completed an independent physical exam.  I have discussed patient's plan of care with PA.   I agree with note as stated above, having amended the EMR as needed to reflect my findings.   This includes HISTORY OF PRESENT ILLNESS, HIV, PAST MEDICAL/SURGICAL/FAMILY/SOCIAL HISTORY, ALLERGIES AND HOME MEDICATIONS, REVIEW OF SYSTEMS, PHYSICAL EXAM, and any PROGRESS NOTES during the time I functioned as the attending physician for this patient.    68M h/o metastatic RCC s/p gamma knife radiosurgery on chemo presented to ED with hematuria and urinary retention.  S/p Barone.  ROS otherwise negative, feels well this morning.    Exam:  - nad  - rrr  - ctab   -abd soft ntnd  - +indwelling Barone with hematuria, no clots noted    A/P  - hematuria  - seen by urology, stable for outpatient follow up

## 2019-04-12 NOTE — ED CDU PROVIDER DISPOSITION NOTE - ATTENDING CONTRIBUTION TO CARE
Dr. De La Cruz:  I performed a face to face bedside interview with patient regarding history of present illness, review of symptoms and past medical history. I completed an independent physical exam.  I have discussed patient's plan of care with PA.   I agree with note as stated above, having amended the EMR as needed to reflect my findings.   This includes HISTORY OF PRESENT ILLNESS, HIV, PAST MEDICAL/SURGICAL/FAMILY/SOCIAL HISTORY, ALLERGIES AND HOME MEDICATIONS, REVIEW OF SYSTEMS, PHYSICAL EXAM, and any PROGRESS NOTES during the time I functioned as the attending physician for this patient.

## 2019-04-12 NOTE — PROGRESS NOTE ADULT - ASSESSMENT
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  Hematuria etiology likely from tumor cell death vs bleeding from the renal mass itself vs nephritis induced from the immunotherapy.  Hematuria clearing currently with 16f card and small volume flushing.  HCt stable as compared to 4/6 unlikely patient had a real Hct drop given hemodynamic stability and small volume hematuria.  UA relatively benign.     -- No urologic intervention (no CBI indicated)  -- Recommend urine culture  -- appreciate heme/onc c/s  -- Start flomax 0.4 mg once at night  -- follow up as an outpatient for a trial of void at the [facility] [phone] with any attending

## 2019-04-12 NOTE — ED CDU PROVIDER SUBSEQUENT DAY NOTE - CONSTITUTIONAL, MLM
normal... chronically ill-appearing, well nourished, awake, alert, oriented to person, place, time/situation and in no apparent distress.

## 2019-04-12 NOTE — PROGRESS NOTE ADULT - SUBJECTIVE AND OBJECTIVE BOX
Subjective  No overnight events. Pt comfortable, no suprapubic tenderness.    Objective    Vital signs  T(F): , Max: 98.8 (04-12-19 @ 07:40)  HR: 101 (04-12-19 @ 07:40)  BP: 124/62 (04-12-19 @ 07:40)  SpO2: 100% (04-12-19 @ 07:40)  Wt(kg): --    Output     04-11 @ 07:01  -  04-12 @ 07:00  --------------------------------------------------------  IN: 0 mL / OUT: 2000 mL / NET: -2000 mL    04-12 @ 07:01  -  04-12 @ 09:27  --------------------------------------------------------  IN: 0 mL / OUT: 1000 mL / NET: -1000 mL        Gen: NAD  Abd: soft, NT, ND  : kyaw output dark pink translucent    Labs      04-12 @ 08:01    WBC --    / Hct --    / SCr 0.77     04-12 @ 07:38    WBC 9.82  / Hct 30.6  / SCr --

## 2019-04-12 NOTE — ED CDU PROVIDER DISPOSITION NOTE - CLINICAL COURSE
Jono:  68M h/o metastatic RCC s/p gamma knife radiosurgery on chemo presented to ED with hematuria and urinary retention.  S/p Barone.  ROS otherwise negative, feels well this morning.  Discharge with leg bag and follow up urology/oncology outpatient.

## 2019-04-14 LAB — PTH RELATED PROT SERPL-MCNC: <2 — SIGNIFICANT CHANGE UP

## 2019-04-15 ENCOUNTER — APPOINTMENT (OUTPATIENT)
Dept: MRI IMAGING | Facility: CLINIC | Age: 69
End: 2019-04-15

## 2019-04-18 ENCOUNTER — APPOINTMENT (OUTPATIENT)
Dept: HEMATOLOGY ONCOLOGY | Facility: CLINIC | Age: 69
End: 2019-04-18

## 2019-04-18 ENCOUNTER — APPOINTMENT (OUTPATIENT)
Dept: RADIATION ONCOLOGY | Facility: CLINIC | Age: 69
End: 2019-04-18

## 2019-04-19 ENCOUNTER — APPOINTMENT (OUTPATIENT)
Dept: UROLOGY | Facility: CLINIC | Age: 69
End: 2019-04-19
Payer: COMMERCIAL

## 2019-04-19 VITALS
WEIGHT: 160 LBS | DIASTOLIC BLOOD PRESSURE: 70 MMHG | RESPIRATION RATE: 20 BRPM | TEMPERATURE: 97.8 F | HEART RATE: 103 BPM | SYSTOLIC BLOOD PRESSURE: 109 MMHG | BODY MASS INDEX: 21.2 KG/M2 | HEIGHT: 73 IN

## 2019-04-19 PROCEDURE — 99203 OFFICE O/P NEW LOW 30 MIN: CPT

## 2019-04-19 NOTE — HISTORY OF PRESENT ILLNESS
[FreeTextEntry1] : Pt w/ metastatic renal cell carcinoma\par After recent chemotherapy session, he subsequently developed urinary retention.\par Now has Barone in place.\par Nocturia x3-4 prior to acute retention.\par Denies weak stream.\par We will start him on Flomax.\par Pt was taught SIC in office today\par He will remove his catheter early Monday morning and RTO for TOV.

## 2019-04-19 NOTE — ASSESSMENT
[FreeTextEntry1] : We will start him on Flomax.\par Pt was taught SIC in office today\par He will remove his catheter early Monday morning and RTO for TOV.

## 2019-04-19 NOTE — ADDENDUM
[FreeTextEntry1] :  I, Medina Granados, acted solely as a scribe for Dr. Ritchie Elaine. The documentation recorded by the scribe accurately reflects the service I personally performed and the decision by me.\par

## 2019-04-22 ENCOUNTER — APPOINTMENT (OUTPATIENT)
Dept: UROLOGY | Facility: CLINIC | Age: 69
End: 2019-04-22

## 2019-04-30 ENCOUNTER — APPOINTMENT (OUTPATIENT)
Dept: MRI IMAGING | Facility: IMAGING CENTER | Age: 69
End: 2019-04-30

## 2019-05-01 ENCOUNTER — LABORATORY RESULT (OUTPATIENT)
Age: 69
End: 2019-05-01

## 2019-05-01 ENCOUNTER — APPOINTMENT (OUTPATIENT)
Dept: INFUSION THERAPY | Facility: HOSPITAL | Age: 69
End: 2019-05-01

## 2019-05-01 ENCOUNTER — RESULT REVIEW (OUTPATIENT)
Age: 69
End: 2019-05-01

## 2019-05-01 LAB
BASOPHILS # BLD AUTO: 0 K/UL — SIGNIFICANT CHANGE UP (ref 0–0.2)
BASOPHILS NFR BLD AUTO: 0.3 % — SIGNIFICANT CHANGE UP (ref 0–2)
EOSINOPHIL # BLD AUTO: 0.3 K/UL — SIGNIFICANT CHANGE UP (ref 0–0.5)
EOSINOPHIL NFR BLD AUTO: 3.3 % — SIGNIFICANT CHANGE UP (ref 0–6)
HCT VFR BLD CALC: 28 % — LOW (ref 39–50)
HGB BLD-MCNC: 8.6 G/DL — LOW (ref 13–17)
LYMPHOCYTES # BLD AUTO: 1.9 K/UL — SIGNIFICANT CHANGE UP (ref 1–3.3)
LYMPHOCYTES # BLD AUTO: 21.9 % — SIGNIFICANT CHANGE UP (ref 13–44)
MCHC RBC-ENTMCNC: 23.8 PG — LOW (ref 27–34)
MCHC RBC-ENTMCNC: 30.8 G/DL — LOW (ref 32–36)
MCV RBC AUTO: 77.3 FL — LOW (ref 80–100)
MONOCYTES # BLD AUTO: 1.2 K/UL — HIGH (ref 0–0.9)
MONOCYTES NFR BLD AUTO: 13.7 % — SIGNIFICANT CHANGE UP (ref 2–14)
NEUTROPHILS # BLD AUTO: 5.3 K/UL — SIGNIFICANT CHANGE UP (ref 1.8–7.4)
NEUTROPHILS NFR BLD AUTO: 60.8 % — SIGNIFICANT CHANGE UP (ref 43–77)
PLATELET # BLD AUTO: 630 K/UL — HIGH (ref 150–400)
RBC # BLD: 3.62 M/UL — LOW (ref 4.2–5.8)
RBC # FLD: 18.2 % — HIGH (ref 10.3–14.5)
WBC # BLD: 8.7 K/UL — SIGNIFICANT CHANGE UP (ref 3.8–10.5)
WBC # FLD AUTO: 8.7 K/UL — SIGNIFICANT CHANGE UP (ref 3.8–10.5)

## 2019-05-15 ENCOUNTER — OUTPATIENT (OUTPATIENT)
Dept: OUTPATIENT SERVICES | Facility: HOSPITAL | Age: 69
LOS: 1 days | Discharge: HOME | End: 2019-05-15

## 2019-05-15 DIAGNOSIS — C64.9 MALIGNANT NEOPLASM OF UNSPECIFIED KIDNEY, EXCEPT RENAL PELVIS: ICD-10-CM

## 2019-05-15 DIAGNOSIS — Z90.49 ACQUIRED ABSENCE OF OTHER SPECIFIED PARTS OF DIGESTIVE TRACT: Chronic | ICD-10-CM

## 2019-05-15 NOTE — ED CDU PROVIDER DISPOSITION NOTE - PRINCIPAL DIAGNOSIS
[None] : None [Good understanding] : Patient has a good understanding of lifestyle changes and the steps needed to achieve self management goals Hematuria

## 2019-05-16 ENCOUNTER — RX RENEWAL (OUTPATIENT)
Age: 69
End: 2019-05-16

## 2019-05-17 ENCOUNTER — APPOINTMENT (OUTPATIENT)
Dept: HEMATOLOGY ONCOLOGY | Facility: CLINIC | Age: 69
End: 2019-05-17

## 2019-05-29 ENCOUNTER — LABORATORY RESULT (OUTPATIENT)
Age: 69
End: 2019-05-29

## 2019-05-29 ENCOUNTER — RESULT REVIEW (OUTPATIENT)
Age: 69
End: 2019-05-29

## 2019-05-29 ENCOUNTER — APPOINTMENT (OUTPATIENT)
Dept: INFUSION THERAPY | Facility: HOSPITAL | Age: 69
End: 2019-05-29

## 2019-05-29 LAB
BASOPHILS # BLD AUTO: 0 K/UL — SIGNIFICANT CHANGE UP (ref 0–0.2)
BASOPHILS NFR BLD AUTO: 0.3 % — SIGNIFICANT CHANGE UP (ref 0–2)
EOSINOPHIL # BLD AUTO: 0.2 K/UL — SIGNIFICANT CHANGE UP (ref 0–0.5)
EOSINOPHIL NFR BLD AUTO: 1.8 % — SIGNIFICANT CHANGE UP (ref 0–6)
HCT VFR BLD CALC: 24.8 % — LOW (ref 39–50)
HGB BLD-MCNC: 7.6 G/DL — LOW (ref 13–17)
LYMPHOCYTES # BLD AUTO: 2.1 K/UL — SIGNIFICANT CHANGE UP (ref 1–3.3)
LYMPHOCYTES # BLD AUTO: 20.6 % — SIGNIFICANT CHANGE UP (ref 13–44)
MCHC RBC-ENTMCNC: 23.1 PG — LOW (ref 27–34)
MCHC RBC-ENTMCNC: 30.7 G/DL — LOW (ref 32–36)
MCV RBC AUTO: 75.1 FL — LOW (ref 80–100)
MONOCYTES # BLD AUTO: 1.3 K/UL — HIGH (ref 0–0.9)
MONOCYTES NFR BLD AUTO: 12.6 % — SIGNIFICANT CHANGE UP (ref 2–14)
NEUTROPHILS # BLD AUTO: 6.5 K/UL — SIGNIFICANT CHANGE UP (ref 1.8–7.4)
NEUTROPHILS NFR BLD AUTO: 64.8 % — SIGNIFICANT CHANGE UP (ref 43–77)
PLATELET # BLD AUTO: 587 K/UL — HIGH (ref 150–400)
RBC # BLD: 3.31 M/UL — LOW (ref 4.2–5.8)
RBC # FLD: 17.5 % — HIGH (ref 10.3–14.5)
WBC # BLD: 10.1 K/UL — SIGNIFICANT CHANGE UP (ref 3.8–10.5)
WBC # FLD AUTO: 10.1 K/UL — SIGNIFICANT CHANGE UP (ref 3.8–10.5)

## 2019-05-30 ENCOUNTER — APPOINTMENT (OUTPATIENT)
Dept: MRI IMAGING | Facility: IMAGING CENTER | Age: 69
End: 2019-05-30

## 2019-05-30 DIAGNOSIS — Z51.11 ENCOUNTER FOR ANTINEOPLASTIC CHEMOTHERAPY: ICD-10-CM

## 2019-06-04 NOTE — REASON FOR VISIT
[Routine Follow-Up] : routine follow-up visit for [Brain Metastasis] : brain metastasis [Other: ___] : [unfilled]

## 2019-06-05 ENCOUNTER — APPOINTMENT (OUTPATIENT)
Dept: RADIATION ONCOLOGY | Facility: CLINIC | Age: 69
End: 2019-06-05
Payer: COMMERCIAL

## 2019-06-05 VITALS
SYSTOLIC BLOOD PRESSURE: 127 MMHG | WEIGHT: 143 LBS | OXYGEN SATURATION: 96 % | BODY MASS INDEX: 18.87 KG/M2 | HEART RATE: 75 BPM | DIASTOLIC BLOOD PRESSURE: 59 MMHG

## 2019-06-05 PROCEDURE — 99213 OFFICE O/P EST LOW 20 MIN: CPT | Mod: GC

## 2019-06-05 NOTE — REVIEW OF SYSTEMS
[Patient Intake Form Reviewed] : Patient intake form was reviewed [Cough] : cough [Negative] : Heme/Lymph [Confused] : no confusion [Dizziness] : no dizziness [Fainting] : no fainting [Difficulty Walking] : no difficulty walking [FreeTextEntry6] : cough still present [FreeTextEntry9] : bilateral EL weakness. right arm feels slightly weaker than left

## 2019-06-05 NOTE — PHYSICAL EXAM
[General Appearance - Well Developed] : well developed [Thin] : thin [] : no respiratory distress [Heart Sounds] : normal S1 and S2 [Abdomen Soft] : soft [Normal] : no focal deficits [Oriented To Time, Place, And Person] : oriented to person, place, and time [de-identified] : CN II-XII normal

## 2019-06-05 NOTE — HISTORY OF PRESENT ILLNESS
[FreeTextEntry1] : Mr. Corrina Atkinson completed gamma knife radiation therapy for a total of 2200 cgy to the left parietal region on 2/14/19 \par \par ONCOLOGY HISTORY\par Mr. Atkinson presented initially on 2/5/19  to discuss the management of a brain metastases.  \par \par He is a 67 y/o gentleman with a new diagnosis of metastatic renal cell carcinoma with innumerable bilateral opacities, hilar lymphadenopathy, bilateral adrenal mets, and an 11cm necrotic mass inseparable from the lower pole of the left kidney.  His diagnoses was made during hospitalization for a chief complaint of right sided shoulder and back pain in addition to coughing. \par \par  A brain MRI from 1/25/19 shows a single 6 x 5 x 6mm enhancing nodule within the centrum semiovale of the frontoparietal region on the left with surrounding vasogenic edema.  Seen as an inpatient and recommended for outpatient consultation for Gamma Knife SRS.  He was placed on Decadron 4mg Q6H.  Met with Dr. Harding as an inpatient.\par \par He met with Dr. Pedroza on 2/1/19 and will be receiving combination checkpoint inhibition with nivolumab and ipilimumab.\par \par At the time of initial consult he remains on dexamethasone 4mg BID. Notes shoulder pain has decreased. Denies headaches, blurry vision, confusion, trouble with balance, nausea, dropping things, feeling clumsy, numbness, or tingling. He notes bilateral legs have been a bit weak since leaving the hospital. \par \par 6/5/19- Mr. Atkinson presents today for follow up. He has continued on nivo and ipi. He missed his brain MRI on 5/30. CT CAP 4/11/19 showed extensive metastatic disease.

## 2019-06-10 ENCOUNTER — APPOINTMENT (OUTPATIENT)
Dept: HEMATOLOGY ONCOLOGY | Facility: CLINIC | Age: 69
End: 2019-06-10
Payer: COMMERCIAL

## 2019-06-10 VITALS
DIASTOLIC BLOOD PRESSURE: 70 MMHG | WEIGHT: 145.51 LBS | OXYGEN SATURATION: 98 % | HEART RATE: 107 BPM | TEMPERATURE: 97.7 F | BODY MASS INDEX: 19.2 KG/M2 | SYSTOLIC BLOOD PRESSURE: 118 MMHG | RESPIRATION RATE: 16 BRPM

## 2019-06-10 PROCEDURE — 99215 OFFICE O/P EST HI 40 MIN: CPT

## 2019-06-10 NOTE — ASSESSMENT
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 Based on IMDC risk prediction he has leukocytosis, anemia, thrombocytosis and hypercalcemia, time to initiated treatment less than one year, 5/6 factors. He has poor risk mccRCC.  The standard of care is combined immunotherapy nivo and ipi based on CHECKMATE 214 trial with significant survival benefit as compared to sunitinib group. I also mentioned to the patient cabozantinib based on [place] trial with PFS and CARTY benefit as compared with sunitinib.S/p cycle 1 nivo and ipi.  The potential AEs are including but not limited to skin rashes, arthralgia, myositis, colitis, hepatitis, nephritis, pneumonitis, thyroiditis, hypophysitis. S/p gamma knife for his brain lesion. He has fatigue, decreased appetite now. s/p 4 cycles of nivo and ipi, now on s/p nivolumab cycle 2\par \par Plan\par continue cycle 3 nivo  for his mRCC\par check labs\par follow up with Dr. Rivers after gamma knife\par RTC in 3 weeks

## 2019-06-10 NOTE — HISTORY OF PRESENT ILLNESS
[Disease: _____________________] : Disease: [unfilled] [T: ___] : T[unfilled] [M: ___] : M[unfilled] [AJCC Stage: ____] : AJCC Stage: [unfilled] [N: ___] : N[unfilled] [de-identified] : clear cell renal cell carcinoma [de-identified] : Pt is a 68 year old man w/ a PMHx GERD, smoking presenting w/ progressive back pain x4 months, subjective fever and cough/sputum, shortness of breath, was hospitalized to [facility]. He states that the back pain is dull, intermittent, and located inferior to his right scapula, and occasional radiated to the side of his chest. Pt states that the pain has gotten worse over the past few months, and it worsens with movement. It frequently awakens him from sleep, but improves with two Advil. Pt also endorses two months of an intermittent cough productive of whitish sputum. Pt also states 30lbs weight loss in last 6 months. He becomes sob on exertion. Pt states he also has a decreased appetite. He also has occasional subjective fevers/chills.  In ED, he was found to have  Alk phos 321, Ca 11.5. CXR showed possible RUL PNA and multiple opacities. CT chest: RUL and LLL lesion, innumerable b/l opacities, hilar lymphadenopathy, b/l adrenal mets. Pt admitted to medical floors for further management and w/u. CT A/P showed an 11.1 cm necrotic mass inseparable from the lower pole of the left kidney; para-aortic lymphadenopathy. Pt followed by oncology team, urology team. Pt w/ decreasing H/H during hospitalization, requiring 1U pRBCs. Started on iron supplementation. Pt had brain MRI which showed left sided frontoparietal metastasis with associated edema. Pt started on decadron 4 mg q6. Neurosurgery recommended outpt SRS. Radiation oncology recommended gamma knife as an outpt. Pt underwent IR biopsy of his lung. \par  [de-identified] : S/P cycle 4 nivo and ipi and s/p gamma knife for his brain lesion with Dr. Rivers.\par \par He feels tired, states constant pain in the right side shoulder blade on tylenol.  His appetite is reduced. His energy level is decreased too.   He denies diarrhea, skin rashes.

## 2019-06-10 NOTE — REVIEW OF SYSTEMS
[Cough] : cough [Joint Pain] : joint pain [Negative] : Allergic/Immunologic [Fatigue] : fatigue [Fever] : no fever [Recent Change In Weight] : ~T no recent weight change [Shortness Of Breath] : no shortness of breath [Wheezing] : no wheezing [SOB on Exertion] : no shortness of breath during exertion [Abdominal Pain] : no abdominal pain [Vomiting] : no vomiting [FreeTextEntry7] : LUQ discomfort [Diarrhea] : no diarrhea [Constipation] : no constipation [FreeTextEntry9] : right scapula pain

## 2019-06-18 ENCOUNTER — APPOINTMENT (OUTPATIENT)
Dept: UROLOGY | Facility: CLINIC | Age: 69
End: 2019-06-18
Payer: COMMERCIAL

## 2019-06-18 ENCOUNTER — OUTPATIENT (OUTPATIENT)
Dept: OUTPATIENT SERVICES | Facility: HOSPITAL | Age: 69
LOS: 1 days | End: 2019-06-18
Payer: COMMERCIAL

## 2019-06-18 VITALS
RESPIRATION RATE: 18 BRPM | DIASTOLIC BLOOD PRESSURE: 69 MMHG | SYSTOLIC BLOOD PRESSURE: 123 MMHG | TEMPERATURE: 97.9 F | HEART RATE: 117 BPM

## 2019-06-18 DIAGNOSIS — Z90.49 ACQUIRED ABSENCE OF OTHER SPECIFIED PARTS OF DIGESTIVE TRACT: Chronic | ICD-10-CM

## 2019-06-18 DIAGNOSIS — R35.0 FREQUENCY OF MICTURITION: ICD-10-CM

## 2019-06-18 PROCEDURE — 51702 INSERT TEMP BLADDER CATH: CPT

## 2019-06-18 RX ORDER — TAMSULOSIN HYDROCHLORIDE 0.4 MG/1
0.4 CAPSULE ORAL
Qty: 90 | Refills: 3 | Status: ACTIVE | COMMUNITY
Start: 2019-06-18 | End: 1900-01-01

## 2019-06-19 ENCOUNTER — OUTPATIENT (OUTPATIENT)
Dept: OUTPATIENT SERVICES | Facility: HOSPITAL | Age: 69
LOS: 1 days | Discharge: HOME | End: 2019-06-19

## 2019-06-19 DIAGNOSIS — Z90.49 ACQUIRED ABSENCE OF OTHER SPECIFIED PARTS OF DIGESTIVE TRACT: Chronic | ICD-10-CM

## 2019-06-19 DIAGNOSIS — C64.9 MALIGNANT NEOPLASM OF UNSPECIFIED KIDNEY, EXCEPT RENAL PELVIS: ICD-10-CM

## 2019-06-24 ENCOUNTER — INPATIENT (INPATIENT)
Facility: HOSPITAL | Age: 69
LOS: 1 days | Discharge: HOME | End: 2019-06-26
Attending: HOSPITALIST | Admitting: HOSPITALIST
Payer: COMMERCIAL

## 2019-06-24 ENCOUNTER — APPOINTMENT (OUTPATIENT)
Dept: HEMATOLOGY ONCOLOGY | Facility: CLINIC | Age: 69
End: 2019-06-24
Payer: COMMERCIAL

## 2019-06-24 ENCOUNTER — APPOINTMENT (OUTPATIENT)
Dept: UROLOGY | Facility: CLINIC | Age: 69
End: 2019-06-24
Payer: COMMERCIAL

## 2019-06-24 VITALS
TEMPERATURE: 97.6 F | RESPIRATION RATE: 17 BRPM | DIASTOLIC BLOOD PRESSURE: 66 MMHG | SYSTOLIC BLOOD PRESSURE: 114 MMHG | OXYGEN SATURATION: 97 % | HEART RATE: 103 BPM

## 2019-06-24 VITALS
HEART RATE: 99 BPM | RESPIRATION RATE: 18 BRPM | SYSTOLIC BLOOD PRESSURE: 113 MMHG | TEMPERATURE: 98 F | OXYGEN SATURATION: 97 % | DIASTOLIC BLOOD PRESSURE: 67 MMHG

## 2019-06-24 DIAGNOSIS — Z90.49 ACQUIRED ABSENCE OF OTHER SPECIFIED PARTS OF DIGESTIVE TRACT: Chronic | ICD-10-CM

## 2019-06-24 DIAGNOSIS — E83.52 HYPERCALCEMIA: ICD-10-CM

## 2019-06-24 DIAGNOSIS — Z51.5 ENCOUNTER FOR PALLIATIVE CARE: ICD-10-CM

## 2019-06-24 DIAGNOSIS — D64.9 ANEMIA, UNSPECIFIED: ICD-10-CM

## 2019-06-24 DIAGNOSIS — R06.02 SHORTNESS OF BREATH: ICD-10-CM

## 2019-06-24 DIAGNOSIS — Z29.9 ENCOUNTER FOR PROPHYLACTIC MEASURES, UNSPECIFIED: ICD-10-CM

## 2019-06-24 DIAGNOSIS — C64.9 MALIGNANT NEOPLASM OF UNSPECIFIED KIDNEY, EXCEPT RENAL PELVIS: ICD-10-CM

## 2019-06-24 DIAGNOSIS — J18.9 PNEUMONIA, UNSPECIFIED ORGANISM: ICD-10-CM

## 2019-06-24 LAB
ALBUMIN SERPL ELPH-MCNC: 3 G/DL — LOW (ref 3.3–5)
ALP SERPL-CCNC: 306 U/L — HIGH (ref 40–120)
ALT FLD-CCNC: 13 U/L — SIGNIFICANT CHANGE UP (ref 4–41)
ANION GAP SERPL CALC-SCNC: 12 MMO/L — SIGNIFICANT CHANGE UP (ref 7–14)
APTT BLD: 26.5 SEC — LOW (ref 27.5–36.3)
AST SERPL-CCNC: 14 U/L — SIGNIFICANT CHANGE UP (ref 4–40)
B PERT DNA SPEC QL NAA+PROBE: NOT DETECTED — SIGNIFICANT CHANGE UP
BASE EXCESS BLDV CALC-SCNC: 1 MMOL/L — SIGNIFICANT CHANGE UP
BASOPHILS # BLD AUTO: 0.07 K/UL — SIGNIFICANT CHANGE UP (ref 0–0.2)
BASOPHILS NFR BLD AUTO: 0.7 % — SIGNIFICANT CHANGE UP (ref 0–2)
BILIRUB SERPL-MCNC: 0.7 MG/DL — SIGNIFICANT CHANGE UP (ref 0.2–1.2)
BLD GP AB SCN SERPL QL: NEGATIVE — SIGNIFICANT CHANGE UP
BLOOD GAS VENOUS - CREATININE: 0.75 MG/DL — SIGNIFICANT CHANGE UP (ref 0.5–1.3)
BLOOD GAS VENOUS - FIO2: 21 — SIGNIFICANT CHANGE UP
BUN SERPL-MCNC: 16 MG/DL — SIGNIFICANT CHANGE UP (ref 7–23)
C PNEUM DNA SPEC QL NAA+PROBE: NOT DETECTED — SIGNIFICANT CHANGE UP
CALCIUM SERPL-MCNC: 13.8 MG/DL — CRITICAL HIGH (ref 8.4–10.5)
CHLORIDE BLDV-SCNC: 96 MMOL/L — SIGNIFICANT CHANGE UP (ref 96–108)
CHLORIDE SERPL-SCNC: 94 MMOL/L — LOW (ref 98–107)
CO2 SERPL-SCNC: 23 MMOL/L — SIGNIFICANT CHANGE UP (ref 22–31)
CREAT SERPL-MCNC: 0.73 MG/DL — SIGNIFICANT CHANGE UP (ref 0.5–1.3)
EOSINOPHIL # BLD AUTO: 0.13 K/UL — SIGNIFICANT CHANGE UP (ref 0–0.5)
EOSINOPHIL NFR BLD AUTO: 1.2 % — SIGNIFICANT CHANGE UP (ref 0–6)
FLUAV H1 2009 PAND RNA SPEC QL NAA+PROBE: NOT DETECTED — SIGNIFICANT CHANGE UP
FLUAV H1 RNA SPEC QL NAA+PROBE: NOT DETECTED — SIGNIFICANT CHANGE UP
FLUAV H3 RNA SPEC QL NAA+PROBE: NOT DETECTED — SIGNIFICANT CHANGE UP
FLUAV SUBTYP SPEC NAA+PROBE: NOT DETECTED — SIGNIFICANT CHANGE UP
FLUBV RNA SPEC QL NAA+PROBE: NOT DETECTED — SIGNIFICANT CHANGE UP
GAS PNL BLDV: 130 MMOL/L — LOW (ref 136–146)
GLUCOSE BLDV-MCNC: 103 MG/DL — HIGH (ref 70–99)
GLUCOSE SERPL-MCNC: 106 MG/DL — HIGH (ref 70–99)
HADV DNA SPEC QL NAA+PROBE: NOT DETECTED — SIGNIFICANT CHANGE UP
HCO3 BLDV-SCNC: 24 MMOL/L — SIGNIFICANT CHANGE UP (ref 20–27)
HCOV PNL SPEC NAA+PROBE: SIGNIFICANT CHANGE UP
HCT VFR BLD CALC: 24.8 % — LOW (ref 39–50)
HCT VFR BLDV CALC: 21.2 % — LOW (ref 39–51)
HGB BLD-MCNC: 6.9 G/DL — CRITICAL LOW (ref 13–17)
HGB BLDV-MCNC: 6.8 G/DL — CRITICAL LOW (ref 13–17)
HMPV RNA SPEC QL NAA+PROBE: NOT DETECTED — SIGNIFICANT CHANGE UP
HPIV1 RNA SPEC QL NAA+PROBE: NOT DETECTED — SIGNIFICANT CHANGE UP
HPIV2 RNA SPEC QL NAA+PROBE: NOT DETECTED — SIGNIFICANT CHANGE UP
HPIV3 RNA SPEC QL NAA+PROBE: NOT DETECTED — SIGNIFICANT CHANGE UP
HPIV4 RNA SPEC QL NAA+PROBE: NOT DETECTED — SIGNIFICANT CHANGE UP
IMM GRANULOCYTES NFR BLD AUTO: 0.7 % — SIGNIFICANT CHANGE UP (ref 0–1.5)
INR BLD: 1.39 — HIGH (ref 0.88–1.17)
LACTATE BLDV-MCNC: 1.6 MMOL/L — SIGNIFICANT CHANGE UP (ref 0.5–2)
LYMPHOCYTES # BLD AUTO: 1.7 K/UL — SIGNIFICANT CHANGE UP (ref 1–3.3)
LYMPHOCYTES # BLD AUTO: 16.1 % — SIGNIFICANT CHANGE UP (ref 13–44)
MAGNESIUM SERPL-MCNC: 2.1 MG/DL — SIGNIFICANT CHANGE UP (ref 1.6–2.6)
MCHC RBC-ENTMCNC: 20.8 PG — LOW (ref 27–34)
MCHC RBC-ENTMCNC: 27.8 % — LOW (ref 32–36)
MCV RBC AUTO: 74.7 FL — LOW (ref 80–100)
MONOCYTES # BLD AUTO: 1.06 K/UL — HIGH (ref 0–0.9)
MONOCYTES NFR BLD AUTO: 10 % — SIGNIFICANT CHANGE UP (ref 2–14)
NEUTROPHILS # BLD AUTO: 7.55 K/UL — HIGH (ref 1.8–7.4)
NEUTROPHILS NFR BLD AUTO: 71.3 % — SIGNIFICANT CHANGE UP (ref 43–77)
NRBC # FLD: 0 K/UL — SIGNIFICANT CHANGE UP (ref 0–0)
NT-PROBNP SERPL-SCNC: 466.7 PG/ML — SIGNIFICANT CHANGE UP
PCO2 BLDV: 41 MMHG — SIGNIFICANT CHANGE UP (ref 41–51)
PH BLDV: 7.41 PH — SIGNIFICANT CHANGE UP (ref 7.32–7.43)
PHOSPHATE SERPL-MCNC: 2.9 MG/DL — SIGNIFICANT CHANGE UP (ref 2.5–4.5)
PLATELET # BLD AUTO: 624 K/UL — HIGH (ref 150–400)
PMV BLD: 9 FL — SIGNIFICANT CHANGE UP (ref 7–13)
PO2 BLDV: < 24 MMHG — LOW (ref 35–40)
POTASSIUM BLDV-SCNC: 4.2 MMOL/L — SIGNIFICANT CHANGE UP (ref 3.4–4.5)
POTASSIUM SERPL-MCNC: 3.9 MMOL/L — SIGNIFICANT CHANGE UP (ref 3.5–5.3)
POTASSIUM SERPL-SCNC: 3.9 MMOL/L — SIGNIFICANT CHANGE UP (ref 3.5–5.3)
PROT SERPL-MCNC: 8.1 G/DL — SIGNIFICANT CHANGE UP (ref 6–8.3)
PROTHROM AB SERPL-ACNC: 15.6 SEC — HIGH (ref 9.8–13.1)
RBC # BLD: 3.32 M/UL — LOW (ref 4.2–5.8)
RBC # FLD: 17.9 % — HIGH (ref 10.3–14.5)
RH IG SCN BLD-IMP: POSITIVE — SIGNIFICANT CHANGE UP
RSV RNA SPEC QL NAA+PROBE: NOT DETECTED — SIGNIFICANT CHANGE UP
RV+EV RNA SPEC QL NAA+PROBE: NOT DETECTED — SIGNIFICANT CHANGE UP
SAO2 % BLDV: 17.2 % — LOW (ref 60–85)
SODIUM SERPL-SCNC: 129 MMOL/L — LOW (ref 135–145)
WBC # BLD: 10.58 K/UL — HIGH (ref 3.8–10.5)
WBC # FLD AUTO: 10.58 K/UL — HIGH (ref 3.8–10.5)

## 2019-06-24 PROCEDURE — 99223 1ST HOSP IP/OBS HIGH 75: CPT | Mod: GC

## 2019-06-24 PROCEDURE — 71046 X-RAY EXAM CHEST 2 VIEWS: CPT | Mod: 26

## 2019-06-24 PROCEDURE — 99213 OFFICE O/P EST LOW 20 MIN: CPT

## 2019-06-24 PROCEDURE — 99205 OFFICE O/P NEW HI 60 MIN: CPT

## 2019-06-24 RX ORDER — SODIUM CHLORIDE 9 MG/ML
1000 INJECTION INTRAMUSCULAR; INTRAVENOUS; SUBCUTANEOUS ONCE
Refills: 0 | Status: COMPLETED | OUTPATIENT
Start: 2019-06-24 | End: 2019-06-24

## 2019-06-24 RX ORDER — OXYCODONE AND ACETAMINOPHEN 5; 325 MG/1; MG/1
1 TABLET ORAL EVERY 4 HOURS
Refills: 0 | Status: DISCONTINUED | OUTPATIENT
Start: 2019-06-24 | End: 2019-06-26

## 2019-06-24 RX ORDER — IPRATROPIUM/ALBUTEROL SULFATE 18-103MCG
3 AEROSOL WITH ADAPTER (GRAM) INHALATION ONCE
Refills: 0 | Status: COMPLETED | OUTPATIENT
Start: 2019-06-24 | End: 2019-06-24

## 2019-06-24 RX ORDER — ACETAMINOPHEN 500 MG
650 TABLET ORAL EVERY 6 HOURS
Refills: 0 | Status: DISCONTINUED | OUTPATIENT
Start: 2019-06-24 | End: 2019-06-26

## 2019-06-24 RX ORDER — FAMOTIDINE 10 MG/ML
1 INJECTION INTRAVENOUS
Qty: 0 | Refills: 0 | DISCHARGE

## 2019-06-24 RX ORDER — CIMETIDINE 200 MG
1 TABLET ORAL
Qty: 0 | Refills: 0 | DISCHARGE

## 2019-06-24 RX ORDER — DOCUSATE SODIUM 100 MG
100 CAPSULE ORAL THREE TIMES A DAY
Refills: 0 | Status: DISCONTINUED | OUTPATIENT
Start: 2019-06-24 | End: 2019-06-26

## 2019-06-24 RX ORDER — LORATADINE 10 MG/1
10 TABLET ORAL DAILY
Refills: 0 | Status: DISCONTINUED | OUTPATIENT
Start: 2019-06-24 | End: 2019-06-26

## 2019-06-24 RX ORDER — SENNA PLUS 8.6 MG/1
2 TABLET ORAL AT BEDTIME
Refills: 0 | Status: DISCONTINUED | OUTPATIENT
Start: 2019-06-24 | End: 2019-06-26

## 2019-06-24 RX ORDER — SODIUM CHLORIDE 9 MG/ML
1000 INJECTION INTRAMUSCULAR; INTRAVENOUS; SUBCUTANEOUS
Refills: 0 | Status: DISCONTINUED | OUTPATIENT
Start: 2019-06-24 | End: 2019-06-25

## 2019-06-24 RX ADMIN — SODIUM CHLORIDE 1000 MILLILITER(S): 9 INJECTION INTRAMUSCULAR; INTRAVENOUS; SUBCUTANEOUS at 13:56

## 2019-06-24 RX ADMIN — Medication 650 MILLIGRAM(S): at 23:51

## 2019-06-24 RX ADMIN — Medication 3 MILLILITER(S): at 13:06

## 2019-06-24 RX ADMIN — Medication 3 MILLILITER(S): at 13:03

## 2019-06-24 RX ADMIN — Medication 3 MILLILITER(S): at 13:07

## 2019-06-24 RX ADMIN — Medication 50 MILLIGRAM(S): at 13:03

## 2019-06-24 RX ADMIN — SODIUM CHLORIDE 1000 MILLILITER(S): 9 INJECTION INTRAMUSCULAR; INTRAVENOUS; SUBCUTANEOUS at 13:04

## 2019-06-24 RX ADMIN — SODIUM CHLORIDE 100 MILLILITER(S): 9 INJECTION INTRAMUSCULAR; INTRAVENOUS; SUBCUTANEOUS at 21:35

## 2019-06-24 RX ADMIN — Medication 100 MILLIGRAM(S): at 23:48

## 2019-06-24 NOTE — ASSESSMENT
[FreeTextEntry1] : Profound weakness, pain and cpugh in a pt w pneumonia on clinical grounds.Pt reluctant, but convoinced to go now to Er for admission.With dx of noreen, new onset severe pain in spine, as well as wt loss, fatigue and generalized weakness in setting of urinary retention, he needs care thjat can not be delivered at home.EMS called.

## 2019-06-24 NOTE — ED ADULT NURSE NOTE - CHIEF COMPLAINT QUOTE
pt BIBA from [facility].  pt was scheduled for chemo and the doctor noticed he had crackles in his lungs.  pt reports SOB.  PMH: renal CA

## 2019-06-24 NOTE — ED ADULT TRIAGE NOTE - CHIEF COMPLAINT QUOTE
pt BIBA from [facility].  pt was scheduled for chemo and the doctor noticed he had crackles in his lungs.  pt reports SOB.  PMH: renal CA

## 2019-06-24 NOTE — ED ADULT NURSE NOTE - OBJECTIVE STATEMENT
Pt received to room 25 sent from [facility] for SOB, wheezing and back pain. Pt was scheduled for chemo today for renal ca. Pt denies chest pain, N/V/D, fever or chills. IV access obtained, labs drawn and sent. Pt placed on cardiac monitor. MD at bedside.

## 2019-06-24 NOTE — H&P ADULT - NSHPLABSRESULTS_GEN_ALL_CORE
6.9    10.58 )-----------( 624      ( 24 Jun 2019 13:00 )             24.8     06-24    129<L>  |  94<L>  |  16  ----------------------------<  106<H>  3.9   |  23  |  0.73    Ca    13.8<HH>      24 Jun 2019 13:00  Phos  2.9     06-24  Mg     2.1     06-24    TPro  8.1  /  Alb  3.0<L>  /  TBili  0.7  /  DBili  x   /  AST  14  /  ALT  13  /  AlkPhos  306<H>  06-24        LIVER FUNCTIONS - ( 24 Jun 2019 13:00 )  Alb: 3.0 g/dL / Pro: 8.1 g/dL / ALK PHOS: 306 u/L / ALT: 13 u/L / AST: 14 u/L / GGT: x           PT/INR - ( 24 Jun 2019 13:00 )   PT: 15.6 SEC;   INR: 1.39          PTT - ( 24 Jun 2019 13:00 )  PTT:26.5 SEC                                  EKG:     RADIOLOGY STUDIES:

## 2019-06-24 NOTE — H&P ADULT - HISTORY OF PRESENT ILLNESS
68M with PMH of metastatic RCC with mets to lung, brain (s/p gamma knife), on immunotherapy with ipilimuman/nivolumab (most recently 5/29) presenting with SOB and uncontrolled pain. Patient recently admitted in April for hypercalcemia, hyponatremia and anemia, and left AMA. Today patient went to get treatment and was sent to ED for SOB.     Patient says pain is mainly across right chest wall, exacerbated by cough. Pain somewhat relieved with tylenol, better controlled with percocet. Patient able to ambulate at baseline, but gets SOB with exertion. 68M with PMH of metastatic RCC with mets to lung, brain (s/p gamma knife), on immunotherapy with ipilimuman/nivolumab (most recently 5/29), chronic card catheter for recurrent hematuria presenting with SOB and uncontrolled pain. Patient recently admitted in April for hypercalcemia, hyponatremia and anemia, and left AMA. Today patient went to get treatment and was sent to ED for SOB.     Patient says pain is mainly across right chest wall, exacerbated by cough. Pain somewhat relieved with tylenol, better controlled with percocet. Patient able to ambulate at baseline, but gets SOB with exertion.

## 2019-06-24 NOTE — REVIEW OF SYSTEMS
[Recent Change In Weight] : ~T recent weight change [Fatigue] : fatigue [Shortness Of Breath] : shortness of breath [Cough] : cough [Wheezing] : wheezing [SOB on Exertion] : shortness of breath during exertion [Negative] : Allergic/Immunologic

## 2019-06-24 NOTE — PHYSICAL EXAM
[Ambulatory and capable of all self care but unable to carry out any work activities] : Status 2- Ambulatory and capable of all self care but unable to carry out any work activities. Up and about more than 50% of waking hours [Cachectic] : cachectic [Normal] : affect appropriate [de-identified] : coarse ronchi l> r lung, w sibilant crackles .

## 2019-06-24 NOTE — ED PROVIDER NOTE - OBJECTIVE STATEMENT
68 yo M, smoker, c PMH of metastatic renal cell ca to brain s/p chemo (last chemo 1 m/a) p/w 4 month h/o worsening SOB and thoracic back pain. no hx of sx, was sent from chemo office before receiving tx for SOB.

## 2019-06-24 NOTE — HISTORY OF PRESENT ILLNESS
[de-identified] : 70 y/o man w newly dx'd mRCC, c/o inc productive cough w sob w pain  in his chest/spine as well .Saw Uro this am w catheter in dwelling which ius causing him severe pain as well.Pt lives alone. S/s of pain and wt loss for 4 months prior to this recent dx.Pain is sharp and travels up and down spine from upper mid back to lower levels.It is worse w cough and movement.

## 2019-06-24 NOTE — ED PROVIDER NOTE - ATTENDING CONTRIBUTION TO CARE
I performed a face-to-face evaluation of the patient and performed a history and physical examination. I agree with the history and physical examination.    Anastasiia: Renal cancer, 4 mos diffuse back pain and SOB (no cough, F). Wheezing. Likely reactive airway disease (smoker, wheezing). Nebs, steroids, labs.

## 2019-06-24 NOTE — H&P ADULT - PROBLEM SELECTOR PLAN 2
Patient with significant hypercalcemia of 13.8. Likely 2/2 either malignancy or chemotherapy or both.  - Will start on NS for 24 hours, monitor for S/S of fluid overload

## 2019-06-24 NOTE — H&P ADULT - NSHPSOCIALHISTORY_GEN_ALL_CORE
Lives alone. Smoker (PPD for many years, now a few cigarettes per day), social alcohol use, no drug use.

## 2019-06-24 NOTE — HISTORY OF PRESENT ILLNESS
[FreeTextEntry1] : 69 year old male w/ metastatic renal cell carcinoma on antineoplastic chemotherapy presents for f/u for acute urinary retention.\par We taught this pt SIC in our office in April, but now has indwelling catheter reinserted last week because there was blood in his urine.\par He took Flomax for one week and stopped on it for unclear reasons.\par I have taught pt how to remove the catheter.\par He will remove it Wednesday morning and RTO for TOV.

## 2019-06-24 NOTE — H&P ADULT - NSHPPHYSICALEXAM_GEN_ALL_CORE
GENERAL: NAD, cachectic  HEAD:  Atraumatic, Normocephalic  EYES: PERRL. EOMI,  conjunctiva and sclera clear.  NECK: Supple  CHEST/LUNG: Coarse breath sounds bilaterally  HEART: Regular rate and rhythm. Normal sounding S1, S2. No murmurs, rubs, or gallops  ABDOMEN: Soft, nontender, nondistended; Bowel sounds present.  EXTREMITIES:  No clubbing, cyanosis, or edema. Peripheral pulses 2+ and symmetric.  PSYCH: AAOx3  NEUROLOGY: non-focal  SKIN: No rashes or lesions GENERAL: NAD, cachectic  HEAD:  Atraumatic, Normocephalic  EYES: PERRL. EOMI,  conjunctiva and sclera clear.  NECK: Supple  CHEST/LUNG: Coarse breath sounds bilaterally  HEART: Regular rate and rhythm. Normal sounding S1, S2. No murmurs, rubs, or gallops  ABDOMEN: Soft, nontender, nondistended; Bowel sounds present.  EXTREMITIES:  No clubbing, cyanosis, or edema. Peripheral pulses 2+ and symmetric.  PSYCH: AAOx3  NEUROLOGY: non-focal  SKIN: No rashes or lesions  VIEYRA: Yellow urine, no gross blood

## 2019-06-24 NOTE — H&P ADULT - ASSESSMENT
68M with PMH of metastatic RCC with mets to lung, brain (s/p gamma knife), on immunotherapy with ipilimuman/nivolumab (most recently 5/29) presenting with SOB and uncontrolled pain. Also with significant hypercalcemia

## 2019-06-24 NOTE — H&P ADULT - PROBLEM SELECTOR PLAN 1
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 Follows at [facility]  - Spoke with Dr. Dietz  - Patient potentially interested in palliative consult tomorrow AM. In interim, will give prn percocet. Will start on bowel regimen

## 2019-06-24 NOTE — H&P ADULT - NSHPREVIEWOFSYSTEMS_GEN_ALL_CORE
General: no sweating; fever; chills; anorexia; +weight loss: malaise  Skin/Breast: no rash; no itching; no dryness	  Ophthalmologic: no diplopia; no photophobia; no lacrimation L; no lacrimation R; No eye pain, no visual changes	  ENMT: no hearing difficulty; no ear pain; no tinnitus; no vertigo; no sinus symptoms: no throat pain  oral pain with lesions.  Respiratory and Thorax: no wheezing; +dyspnea; +cough; no hemoptysis, no sputum production, no stridor	  Cardiovascular: no chest pain; no palpitations; no dyspnea on exertion; no orthopnea, no pedal edema  Gastrointestinal: no nausea; no vomiting; no melena; no hematochezia; no jaundice; no diarrhea, no abdominal pain.   Genitourinary: no hematuria; no renal colic; no flank pain L; no flank pain R; no urine discoloration; no dysuria  Musculoskeletal: no arthralgia; no arthritis; no joint swelling; no myalgia  Neurological: no weakness; no headache; no loss of consciousness; no confusion  Psychiatric: no suicidal ideation; no depression; no anxiety; no insomnia  Hematology/Lymphatics: no gum bleeding; no nose bleeding; no skin lumps  Endocrine: No heat/cold intolerence, no polydipsia, or polyuria.

## 2019-06-24 NOTE — ASSESSMENT
[FreeTextEntry1] : We taught this pt SIC in our office in April, but now has indwelling catheter reinserted last week because there was blood in his urine.\par He took Flomax for one week and stopped on it for unclear reasons.\par I have taught pt how to remove the catheter.\par He will remove it Wednesday morning and RTO for TOV.

## 2019-06-24 NOTE — ED PROVIDER NOTE - CLINICAL SUMMARY MEDICAL DECISION MAKING FREE TEXT BOX
Anastasiia: Renal cancer, 4 mos diffuse back pain and SOB (no cough, F). Wheezing. Likely reactive airway disease (smoker, wheezing). Nebs, steroids, labs.

## 2019-06-25 LAB
ANION GAP SERPL CALC-SCNC: 12 MMO/L — SIGNIFICANT CHANGE UP (ref 7–14)
BASOPHILS # BLD AUTO: 0.01 K/UL — SIGNIFICANT CHANGE UP (ref 0–0.2)
BASOPHILS NFR BLD AUTO: 0.1 % — SIGNIFICANT CHANGE UP (ref 0–2)
BUN SERPL-MCNC: 17 MG/DL — SIGNIFICANT CHANGE UP (ref 7–23)
CALCIUM SERPL-MCNC: 12.2 MG/DL — HIGH (ref 8.4–10.5)
CHLORIDE SERPL-SCNC: 99 MMOL/L — SIGNIFICANT CHANGE UP (ref 98–107)
CO2 SERPL-SCNC: 21 MMOL/L — LOW (ref 22–31)
CREAT SERPL-MCNC: 0.73 MG/DL — SIGNIFICANT CHANGE UP (ref 0.5–1.3)
EOSINOPHIL # BLD AUTO: 0 K/UL — SIGNIFICANT CHANGE UP (ref 0–0.5)
EOSINOPHIL NFR BLD AUTO: 0 % — SIGNIFICANT CHANGE UP (ref 0–6)
GLUCOSE SERPL-MCNC: 132 MG/DL — HIGH (ref 70–99)
HCT VFR BLD CALC: 25.3 % — LOW (ref 39–50)
HGB BLD-MCNC: 7 G/DL — CRITICAL LOW (ref 13–17)
IMM GRANULOCYTES NFR BLD AUTO: 0.5 % — SIGNIFICANT CHANGE UP (ref 0–1.5)
LYMPHOCYTES # BLD AUTO: 1.4 K/UL — SIGNIFICANT CHANGE UP (ref 1–3.3)
LYMPHOCYTES # BLD AUTO: 13.9 % — SIGNIFICANT CHANGE UP (ref 13–44)
MAGNESIUM SERPL-MCNC: 2.2 MG/DL — SIGNIFICANT CHANGE UP (ref 1.6–2.6)
MCHC RBC-ENTMCNC: 20.8 PG — LOW (ref 27–34)
MCHC RBC-ENTMCNC: 27.7 % — LOW (ref 32–36)
MCV RBC AUTO: 75.1 FL — LOW (ref 80–100)
MONOCYTES # BLD AUTO: 1.03 K/UL — HIGH (ref 0–0.9)
MONOCYTES NFR BLD AUTO: 10.3 % — SIGNIFICANT CHANGE UP (ref 2–14)
NEUTROPHILS # BLD AUTO: 7.55 K/UL — HIGH (ref 1.8–7.4)
NEUTROPHILS NFR BLD AUTO: 75.2 % — SIGNIFICANT CHANGE UP (ref 43–77)
NRBC # FLD: 0 K/UL — SIGNIFICANT CHANGE UP (ref 0–0)
OSMOLALITY SERPL: 284 MOSMO/KG — SIGNIFICANT CHANGE UP (ref 275–295)
PHOSPHATE SERPL-MCNC: 3.4 MG/DL — SIGNIFICANT CHANGE UP (ref 2.5–4.5)
PLATELET # BLD AUTO: 529 K/UL — HIGH (ref 150–400)
PMV BLD: 8.9 FL — SIGNIFICANT CHANGE UP (ref 7–13)
POTASSIUM SERPL-MCNC: 4.3 MMOL/L — SIGNIFICANT CHANGE UP (ref 3.5–5.3)
POTASSIUM SERPL-SCNC: 4.3 MMOL/L — SIGNIFICANT CHANGE UP (ref 3.5–5.3)
RBC # BLD: 3.37 M/UL — LOW (ref 4.2–5.8)
RBC # FLD: 17.5 % — HIGH (ref 10.3–14.5)
SODIUM SERPL-SCNC: 132 MMOL/L — LOW (ref 135–145)
WBC # BLD: 10.04 K/UL — SIGNIFICANT CHANGE UP (ref 3.8–10.5)
WBC # FLD AUTO: 10.04 K/UL — SIGNIFICANT CHANGE UP (ref 3.8–10.5)

## 2019-06-25 PROCEDURE — 71260 CT THORAX DX C+: CPT | Mod: 26

## 2019-06-25 PROCEDURE — 99222 1ST HOSP IP/OBS MODERATE 55: CPT

## 2019-06-25 PROCEDURE — 74177 CT ABD & PELVIS W/CONTRAST: CPT | Mod: 26

## 2019-06-25 PROCEDURE — 99233 SBSQ HOSP IP/OBS HIGH 50: CPT | Mod: GC

## 2019-06-25 RX ORDER — PAMIDRONATE DISODIUM 9 MG/ML
90 INJECTION, SOLUTION INTRAVENOUS ONCE
Refills: 0 | Status: COMPLETED | OUTPATIENT
Start: 2019-06-25 | End: 2019-06-25

## 2019-06-25 RX ORDER — SODIUM CHLORIDE 9 MG/ML
1000 INJECTION INTRAMUSCULAR; INTRAVENOUS; SUBCUTANEOUS
Refills: 0 | Status: DISCONTINUED | OUTPATIENT
Start: 2019-06-25 | End: 2019-06-26

## 2019-06-25 RX ADMIN — Medication 100 MILLIGRAM(S): at 17:11

## 2019-06-25 RX ADMIN — PAMIDRONATE DISODIUM 65 MILLIGRAM(S): 9 INJECTION, SOLUTION INTRAVENOUS at 18:02

## 2019-06-25 RX ADMIN — SODIUM CHLORIDE 125 MILLILITER(S): 9 INJECTION INTRAMUSCULAR; INTRAVENOUS; SUBCUTANEOUS at 22:41

## 2019-06-25 RX ADMIN — LORATADINE 10 MILLIGRAM(S): 10 TABLET ORAL at 11:44

## 2019-06-25 RX ADMIN — SODIUM CHLORIDE 125 MILLILITER(S): 9 INJECTION INTRAMUSCULAR; INTRAVENOUS; SUBCUTANEOUS at 17:11

## 2019-06-25 RX ADMIN — OXYCODONE AND ACETAMINOPHEN 1 TABLET(S): 5; 325 TABLET ORAL at 19:21

## 2019-06-25 RX ADMIN — Medication 100 MILLIGRAM(S): at 21:57

## 2019-06-25 RX ADMIN — OXYCODONE AND ACETAMINOPHEN 1 TABLET(S): 5; 325 TABLET ORAL at 22:16

## 2019-06-25 RX ADMIN — Medication 100 MILLIGRAM(S): at 06:47

## 2019-06-25 NOTE — CONSULT NOTE ADULT - ASSESSMENT
69m with metastatic RCC, presenting with SOB.     INCOMPLETE    -C/w IV fluids  -Pamidronate 90mg x 1  -CTA to r/o PE  -Transfuse 1u PRBC  -Palliative care consult  -Supportive care, pain control, Nutrition, PT, DVT ppx  -Outpatient oncology f/u    Will follow. Please do not hesitate to call back with questions.     Suyapa Dietz MD  Medical Oncology Attending 68M with poor risk metastatic RCC with mets to lung, brain (s/p gamma knife), on immunotherapy, s/p 4 cycles of ipi/nivo, now on single agent nivolumab (most recently 5/29), chronic card catheter for recurrent hematuria presenting with SOB and uncontrolled pain.    Hypercalcemia of malignancy  Chest pain   SOB - PE, Pneumonitis, and PNA should be ruled out.     -C/w IV fluids for hypercalcemia  -Pamidronate 90mg x 1 for hypercalcemia  -Transfuse 1u PRBC  -CT C/A/P to R/O PE and for restaging of RCC   -Whole body bone scan for restaging of RCC  -Palliative care consult for pain management  -Supportive care, pain control, Nutrition, PT, DVT ppx  -Outpatient oncology f/u    Will follow. Please do not hesitate to call back with questions.     Suyapa Dietz MD  Medical Oncology Attending

## 2019-06-25 NOTE — PROGRESS NOTE ADULT - SUBJECTIVE AND OBJECTIVE BOX
***************************************************************  Dr. Marcus Ballesteros, PGY1  Internal Medicine   [facility] Pager:   [facility] Pager:   [phone]*******************************    ISIDORO SHOOK  69y  MRN: [id number]    Subjective:    Patient is a 69y old  Male who presents with a chief complaint of Pain/SOB (24 Jun 2019 16:48)      HPI:      MEDICATIONS  (STANDING):  docusate sodium 100 milliGRAM(s) Oral three times a day  loratadine 10 milliGRAM(s) Oral daily  sodium chloride 0.9%. 1000 milliLiter(s) (100 mL/Hr) IV Continuous <Continuous>    MEDICATIONS  (PRN):  acetaminophen   Tablet .. 650 milliGRAM(s) Oral every 6 hours PRN Mild Pain (1 - 3)  oxyCODONE    5 mG/acetaminophen 325 mG 1 Tablet(s) Oral every 4 hours PRN Moderate - Severe Pain  senna 2 Tablet(s) Oral at bedtime PRN Constipation        Objective:    Vitals: Vital Signs Last 24 Hrs  T(C): 36.7 (06-25-19 @ 07:23), Max: 36.9 (06-25-19 @ 06:46)  T(F): 98 (06-25-19 @ 07:23), Max: 98.5 (06-25-19 @ 06:46)  HR: 85 (06-25-19 @ 07:23) (83 - 99)  BP: 121/58 (06-25-19 @ 07:23) (96/45 - 121/58)  BP(mean): --  RR: 17 (06-25-19 @ 07:23) (16 - 29)  SpO2: 99% (06-25-19 @ 07:23) (96% - 100%)            I&O's Summary    25 Jun 2019 07:01  -  25 Jun 2019 07:39  --------------------------------------------------------  IN: 0 mL / OUT: 1100 mL / NET: -1100 mL        PHYSICAL EXAM:  GENERAL: NAD  HEAD:  Atraumatic, Normocephalic  EYES: EOMI, PERRLA, conjunctiva and sclera clear  CHEST/LUNG: Clear to percussion bilaterally; No rales, rhonchi, wheezing, or rubs  HEART: Regular rate and rhythm; No murmurs, rubs, or gallops  ABDOMEN: Soft, Nontender, Nondistended; Bowel sounds present  SKIN: No rashes or lesions  NERVOUS SYSTEM:  Alert & Oriented X3, Good concentration; Motor Strength 5/5 B/L upper and lower extremities                                           LABS:  06-25    132<L>  |  99  |  17  ----------------------------<  132<H>  4.3   |  21<L>  |  0.73  06-24    129<L>  |  94<L>  |  16  ----------------------------<  106<H>  3.9   |  23  |  0.73    Ca    12.2<H>      25 Jun 2019 05:30  Ca    13.8<HH>      24 Jun 2019 13:00  Phos  3.4     06-25  Mg     2.2     06-25    TPro  8.1  /  Alb  3.0<L>  /  TBili  0.7  /  DBili  x   /  AST  14  /  ALT  13  /  AlkPhos  306<H>  06-24      PT/INR - ( 24 Jun 2019 13:00 )   PT: 15.6 SEC;   INR: 1.39          PTT - ( 24 Jun 2019 13:00 )  PTT:26.5 SEC                                        7.0    10.04 )-----------( 529      ( 25 Jun 2019 05:30 )             25.3                         6.9    10.58 )-----------( 624      ( 24 Jun 2019 13:00 )             24.8     CAPILLARY BLOOD GLUCOSE          RADIOLOGY & ADDITIONAL TESTS:    Imaging Personally Reviewed:  [ ] YES  [ ] NO      Consultants involved in case:   Consultant(s) Notes Reviewed:  [ ] YES  [ ] NO:   Care Discussed with Consultants/Other Providers [ ] YES  [ ] NO

## 2019-06-25 NOTE — PROGRESS NOTE ADULT - PROBLEM SELECTOR PLAN 1
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 Follows at [facility]  - Percoset PRN  - Consider palliative care consult Metastatic RCC with mets to lung, brain (s/p gamma knife), on immunotherapy with ipilimuman/nivolumab (most recently 5/29). Follows at [facility]  - Patient still desires treatment; requires restaging of RCC  -CTAP with contrast for restaging of RCC shows progression of neoplastic disease (preliminary report)  -CT chest with contrast to r/o PE and restaging of RCC  - To do: MRI head w/ contrast and whole body bone scan for restaging of RCC  - Percoset PRN  - Consider palliative care consult  -Talk with patient's oncologist for collateral

## 2019-06-25 NOTE — PROGRESS NOTE ADULT - PROBLEM SELECTOR PLAN 4
Patient with anemia likely 2/2 malignancy and chemotherapy  - H/H stable at 7, patient asymptomatic  - Continue to follow, consider trasnfusion is Hgb <7 Patient with anemia likely 2/2 malignancy and chemotherapy  - previously unresponsive to 1unit of PRBCs in ED  - Transfused 1u PRBC's  -Trend H/H

## 2019-06-25 NOTE — PROGRESS NOTE ADULT - ATTENDING COMMENTS
Pt without SOB on my history/exam. No tachycardia or hypoxia. Doubt PE or acute infection. Suspect related to progression of disease and worsening anemia. F/u re-staging imaging. Appreciate Oncology consult.

## 2019-06-25 NOTE — PHYSICAL THERAPY INITIAL EVALUATION ADULT - PERTINENT HX OF CURRENT PROBLEM, REHAB EVAL
Patient is a 69 year old male admitted to [facility] on 6/24 with SOB and uncontrolled pain. Patient recently admitted in April for hypercalcemia, hyponatremia and anemia, and left AMA. Patient went to get treatment and was sent to ED for SOB. PMH of metastatic RCC with mets to lung, brain (s/p gamma knife), on immunotherapy with ipilimuman/nivolumab (most recently 5/29), chronic card catheter for recurrent hematuria.

## 2019-06-25 NOTE — PHYSICAL THERAPY INITIAL EVALUATION ADULT - ADDITIONAL COMMENTS
Patient reports he lives alone in a private house with 4 steps to enter. Patient reports he was previously independent in all ADLs and did not use an assistive device for ambulation.     Patient was left sitting at the edge of the bed, PCA present, all lines/tubes intact and call hill within reach, DAJUAN roy

## 2019-06-25 NOTE — PROGRESS NOTE ADULT - PROBLEM SELECTOR PLAN 2
Patient with significant hypercalcemia of 13.8. Likely 2/2 either malignancy or chemotherapy or both.  - Will start on NS for 24 hours, monitor for S/S of fluid overload Patient with significant hypercalcemia of 13.8. Likely 2/2 either malignancy or chemotherapy or both.  - NS rate increased to 125   -As per oncology, added Pamidronate 90mg x 1

## 2019-06-25 NOTE — PHYSICAL THERAPY INITIAL EVALUATION ADULT - PATIENT PROFILE REVIEW, REHAB EVAL
PT orders received: no formal activity order. Consult with DAJUAN GERARDO, pt may participate in PT evaluation and ambulate with PT./yes

## 2019-06-25 NOTE — CONSULT NOTE ADULT - SUBJECTIVE AND OBJECTIVE BOX
Patient is a 69y old  Male who presents with a chief complaint of Pain/SOB (25 Jun 2019 07:38)      HPI:  68M metastatic RCC with mets to lung, brain (s/p gamma knife), on immunotherapy with nivolumab (most recently 5/29), chronic card catheter for recurrent hematuria presenting with SOB and uncontrolled pain. Patient recently admitted in April for hypercalcemia, hyponatremia and anemia, and left AMA. Today patient went to get treatment and was sent to ED for SOB.     Patient says pain is mainly across right chest wall, exacerbated by cough. Pain somewhat relieved with tylenol, better controlled with percocet. Patient able to ambulate at baseline, but gets SOB with exertion. (24 Jun 2019 16:48)       Oncologic History:      ROS: as above     PAST MEDICAL & SURGICAL HISTORY:  Metastatic renal cell carcinoma: to brain and lung s/p gammaknife, 3 cycles nipo/ipi  GERD (gastroesophageal reflux disease)  Appendicitis  History of appendectomy      SOCIAL HISTORY:    FAMILY HISTORY:  No pertinent family history in first degree relatives      MEDICATIONS  (STANDING):  docusate sodium 100 milliGRAM(s) Oral three times a day  loratadine 10 milliGRAM(s) Oral daily  sodium chloride 0.9%. 1000 milliLiter(s) (100 mL/Hr) IV Continuous <Continuous>    MEDICATIONS  (PRN):  acetaminophen   Tablet .. 650 milliGRAM(s) Oral every 6 hours PRN Mild Pain (1 - 3)  oxyCODONE    5 mG/acetaminophen 325 mG 1 Tablet(s) Oral every 4 hours PRN Moderate - Severe Pain  senna 2 Tablet(s) Oral at bedtime PRN Constipation      Allergies    No Known Allergies    Intolerances        Vital Signs Last 24 Hrs  T(C): 36.7 (25 Jun 2019 07:23), Max: 36.9 (25 Jun 2019 06:46)  T(F): 98 (25 Jun 2019 07:23), Max: 98.5 (25 Jun 2019 06:46)  HR: 85 (25 Jun 2019 07:23) (83 - 99)  BP: 121/58 (25 Jun 2019 07:23) (96/45 - 121/58)  BP(mean): --  RR: 17 (25 Jun 2019 07:23) (16 - 29)  SpO2: 99% (25 Jun 2019 07:23) (96% - 100%)    PHYSICAL EXAM  General: adult in NAD  HEENT: clear oropharynx, anicteric sclera, pink conjunctiva  Neck: supple  CV: normal S1/S2 with no murmur rubs or gallops  Lungs: positive air movement b/l ant lungs, clear to auscultation, no wheezes, no rales  Abdomen: soft non-tender non-distended, no hepatosplenomegaly  Ext: no clubbing cyanosis or edema  Skin: no rashes and no petechiae  Neuro: alert and oriented X 3, none focal    LABS:                          7.0    10.04 )-----------( 529      ( 25 Jun 2019 05:30 )             25.3         Mean Cell Volume : 75.1 fL  Mean Cell Hemoglobin : 20.8 pg  Mean Cell Hemoglobin Concentration : 27.7 %  Auto Neutrophil # : 7.55 K/uL  Auto Lymphocyte # : 1.40 K/uL  Auto Monocyte # : 1.03 K/uL  Auto Eosinophil # : 0.00 K/uL  Auto Basophil # : 0.01 K/uL  Auto Neutrophil % : 75.2 %  Auto Lymphocyte % : 13.9 %  Auto Monocyte % : 10.3 %  Auto Eosinophil % : 0.0 %  Auto Basophil % : 0.1 %      Serial CBC's  06-25 @ 05:30  Hct-25.3 / Hgb-7.0 / Plat-529 / RBC-3.37 / WBC-10.04  Serial CBC's  06-24 @ 13:00  Hct-24.8 / Hgb-6.9 / Plat-624 / RBC-3.32 / WBC-10.58      06-25    132<L>  |  99  |  17  ----------------------------<  132<H>  4.3   |  21<L>  |  0.73    Ca    12.2<H>      25 Jun 2019 05:30  Phos  3.4     06-25  Mg     2.2     06-25    TPro  8.1  /  Alb  3.0<L>  /  TBili  0.7  /  DBili  x   /  AST  14  /  ALT  13  /  AlkPhos  306<H>  06-24      PT/INR - ( 24 Jun 2019 13:00 )   PT: 15.6 SEC;   INR: 1.39          PTT - ( 24 Jun 2019 13:00 )  PTT:26.5 SEC                RADIOLOGY & ADDITIONAL STUDIES: Patient is a 69y old  Male who presents with a chief complaint of Pain/SOB (25 Jun 2019 07:38)      HPI:  68M with poor risk metastatic RCC with mets to lung, brain (s/p gamma knife), on immunotherapy, s/p 4 cycles of ipi/nivo, now on single agent nivolumab (most recently 5/29), chronic card catheter for recurrent hematuria presenting with SOB and uncontrolled pain. Patient recently admitted in April for hypercalcemia, hyponatremia and anemia, and left AMA. Today patient went to get treatment and was sent to ED for SOB.     Patient says pain is mainly across right chest wall, exacerbated by cough. Pain somewhat relieved with tylenol, better controlled with percocet. Patient able to ambulate at baseline, but gets SOB with exertion. (24 Jun 2019 16:48)       ROS: as above     PAST MEDICAL & SURGICAL HISTORY:  Metastatic renal cell carcinoma: to brain and lung s/p gammaknife, 3 cycles nipo/ipi  GERD (gastroesophageal reflux disease)  Appendicitis  History of appendectomy      SOCIAL HISTORY:    FAMILY HISTORY:  No pertinent family history in first degree relatives      MEDICATIONS  (STANDING):  docusate sodium 100 milliGRAM(s) Oral three times a day  loratadine 10 milliGRAM(s) Oral daily  sodium chloride 0.9%. 1000 milliLiter(s) (100 mL/Hr) IV Continuous <Continuous>    MEDICATIONS  (PRN):  acetaminophen   Tablet .. 650 milliGRAM(s) Oral every 6 hours PRN Mild Pain (1 - 3)  oxyCODONE    5 mG/acetaminophen 325 mG 1 Tablet(s) Oral every 4 hours PRN Moderate - Severe Pain  senna 2 Tablet(s) Oral at bedtime PRN Constipation      Allergies    No Known Allergies    Intolerances        Vital Signs Last 24 Hrs  T(C): 36.7 (25 Jun 2019 07:23), Max: 36.9 (25 Jun 2019 06:46)  T(F): 98 (25 Jun 2019 07:23), Max: 98.5 (25 Jun 2019 06:46)  HR: 85 (25 Jun 2019 07:23) (83 - 99)  BP: 121/58 (25 Jun 2019 07:23) (96/45 - 121/58)  BP(mean): --  RR: 17 (25 Jun 2019 07:23) (16 - 29)  SpO2: 99% (25 Jun 2019 07:23) (96% - 100%)    PHYSICAL EXAM  General: adult in NAD  HEENT: clear oropharynx, anicteric sclera, pink conjunctiva  Neck: supple  CV: normal S1/S2 with no murmur rubs or gallops  Lungs: positive air movement b/l ant lungs, clear to auscultation, no wheezes, no rales  Abdomen: soft non-tender non-distended, no hepatosplenomegaly  Ext: no clubbing cyanosis or edema  Skin: no rashes and no petechiae  Neuro: alert and oriented X 3, none focal    LABS:                          7.0    10.04 )-----------( 529      ( 25 Jun 2019 05:30 )             25.3         Mean Cell Volume : 75.1 fL  Mean Cell Hemoglobin : 20.8 pg  Mean Cell Hemoglobin Concentration : 27.7 %  Auto Neutrophil # : 7.55 K/uL  Auto Lymphocyte # : 1.40 K/uL  Auto Monocyte # : 1.03 K/uL  Auto Eosinophil # : 0.00 K/uL  Auto Basophil # : 0.01 K/uL  Auto Neutrophil % : 75.2 %  Auto Lymphocyte % : 13.9 %  Auto Monocyte % : 10.3 %  Auto Eosinophil % : 0.0 %  Auto Basophil % : 0.1 %      Serial CBC's  06-25 @ 05:30  Hct-25.3 / Hgb-7.0 / Plat-529 / RBC-3.37 / WBC-10.04  Serial CBC's  06-24 @ 13:00  Hct-24.8 / Hgb-6.9 / Plat-624 / RBC-3.32 / WBC-10.58      06-25    132<L>  |  99  |  17  ----------------------------<  132<H>  4.3   |  21<L>  |  0.73    Ca    12.2<H>      25 Jun 2019 05:30  Phos  3.4     06-25  Mg     2.2     06-25    TPro  8.1  /  Alb  3.0<L>  /  TBili  0.7  /  DBili  x   /  AST  14  /  ALT  13  /  AlkPhos  306<H>  06-24      PT/INR - ( 24 Jun 2019 13:00 )   PT: 15.6 SEC;   INR: 1.39          PTT - ( 24 Jun 2019 13:00 )  PTT:26.5 SEC                RADIOLOGY & ADDITIONAL STUDIES:

## 2019-06-26 ENCOUNTER — APPOINTMENT (OUTPATIENT)
Dept: INFUSION THERAPY | Facility: HOSPITAL | Age: 69
End: 2019-06-26

## 2019-06-26 ENCOUNTER — TRANSCRIPTION ENCOUNTER (OUTPATIENT)
Age: 69
End: 2019-06-26

## 2019-06-26 ENCOUNTER — APPOINTMENT (OUTPATIENT)
Dept: UROLOGY | Facility: CLINIC | Age: 69
End: 2019-06-26

## 2019-06-26 VITALS
DIASTOLIC BLOOD PRESSURE: 64 MMHG | SYSTOLIC BLOOD PRESSURE: 114 MMHG | RESPIRATION RATE: 18 BRPM | HEART RATE: 93 BPM | OXYGEN SATURATION: 100 % | TEMPERATURE: 97 F

## 2019-06-26 LAB
ALBUMIN SERPL ELPH-MCNC: 2.5 G/DL — LOW (ref 3.3–5)
ALP SERPL-CCNC: 213 U/L — HIGH (ref 40–120)
ALT FLD-CCNC: 11 U/L — SIGNIFICANT CHANGE UP (ref 4–41)
ANION GAP SERPL CALC-SCNC: 11 MMO/L — SIGNIFICANT CHANGE UP (ref 7–14)
AST SERPL-CCNC: 14 U/L — SIGNIFICANT CHANGE UP (ref 4–40)
BASOPHILS # BLD AUTO: 0.05 K/UL — SIGNIFICANT CHANGE UP (ref 0–0.2)
BASOPHILS NFR BLD AUTO: 0.4 % — SIGNIFICANT CHANGE UP (ref 0–2)
BILIRUB SERPL-MCNC: 0.7 MG/DL — SIGNIFICANT CHANGE UP (ref 0.2–1.2)
BLD GP AB SCN SERPL QL: NEGATIVE — SIGNIFICANT CHANGE UP
BUN SERPL-MCNC: 14 MG/DL — SIGNIFICANT CHANGE UP (ref 7–23)
CALCIUM SERPL-MCNC: 12.1 MG/DL — HIGH (ref 8.4–10.5)
CHLORIDE SERPL-SCNC: 100 MMOL/L — SIGNIFICANT CHANGE UP (ref 98–107)
CO2 SERPL-SCNC: 21 MMOL/L — LOW (ref 22–31)
CREAT SERPL-MCNC: 0.63 MG/DL — SIGNIFICANT CHANGE UP (ref 0.5–1.3)
EOSINOPHIL # BLD AUTO: 0.19 K/UL — SIGNIFICANT CHANGE UP (ref 0–0.5)
EOSINOPHIL NFR BLD AUTO: 1.6 % — SIGNIFICANT CHANGE UP (ref 0–6)
GLUCOSE SERPL-MCNC: 81 MG/DL — SIGNIFICANT CHANGE UP (ref 70–99)
HCT VFR BLD CALC: 29.7 % — LOW (ref 39–50)
HGB BLD-MCNC: 8.3 G/DL — LOW (ref 13–17)
IMM GRANULOCYTES NFR BLD AUTO: 0.4 % — SIGNIFICANT CHANGE UP (ref 0–1.5)
LYMPHOCYTES # BLD AUTO: 1.42 K/UL — SIGNIFICANT CHANGE UP (ref 1–3.3)
LYMPHOCYTES # BLD AUTO: 12.2 % — LOW (ref 13–44)
MAGNESIUM SERPL-MCNC: 1.9 MG/DL — SIGNIFICANT CHANGE UP (ref 1.6–2.6)
MCHC RBC-ENTMCNC: 21.4 PG — LOW (ref 27–34)
MCHC RBC-ENTMCNC: 27.9 % — LOW (ref 32–36)
MCV RBC AUTO: 76.5 FL — LOW (ref 80–100)
MONOCYTES # BLD AUTO: 1.18 K/UL — HIGH (ref 0–0.9)
MONOCYTES NFR BLD AUTO: 10.2 % — SIGNIFICANT CHANGE UP (ref 2–14)
NEUTROPHILS # BLD AUTO: 8.73 K/UL — HIGH (ref 1.8–7.4)
NEUTROPHILS NFR BLD AUTO: 75.2 % — SIGNIFICANT CHANGE UP (ref 43–77)
NRBC # FLD: 0 K/UL — SIGNIFICANT CHANGE UP (ref 0–0)
PHOSPHATE SERPL-MCNC: 2.5 MG/DL — SIGNIFICANT CHANGE UP (ref 2.5–4.5)
PLATELET # BLD AUTO: 484 K/UL — HIGH (ref 150–400)
PMV BLD: 8.5 FL — SIGNIFICANT CHANGE UP (ref 7–13)
POTASSIUM SERPL-MCNC: 3.8 MMOL/L — SIGNIFICANT CHANGE UP (ref 3.5–5.3)
POTASSIUM SERPL-SCNC: 3.8 MMOL/L — SIGNIFICANT CHANGE UP (ref 3.5–5.3)
PROT SERPL-MCNC: 6.8 G/DL — SIGNIFICANT CHANGE UP (ref 6–8.3)
RBC # BLD: 3.88 M/UL — LOW (ref 4.2–5.8)
RBC # FLD: 17.8 % — HIGH (ref 10.3–14.5)
RH IG SCN BLD-IMP: POSITIVE — SIGNIFICANT CHANGE UP
SODIUM SERPL-SCNC: 132 MMOL/L — LOW (ref 135–145)
WBC # BLD: 11.62 K/UL — HIGH (ref 3.8–10.5)
WBC # FLD AUTO: 11.62 K/UL — HIGH (ref 3.8–10.5)

## 2019-06-26 PROCEDURE — 70552 MRI BRAIN STEM W/DYE: CPT | Mod: 26

## 2019-06-26 PROCEDURE — 99239 HOSP IP/OBS DSCHRG MGMT >30: CPT

## 2019-06-26 PROCEDURE — 78306 BONE IMAGING WHOLE BODY: CPT | Mod: 26

## 2019-06-26 RX ORDER — TAMSULOSIN HYDROCHLORIDE 0.4 MG/1
1 CAPSULE ORAL
Qty: 30 | Refills: 0
Start: 2019-06-26 | End: 2019-07-25

## 2019-06-26 RX ORDER — POLYETHYLENE GLYCOL 3350 17 G/17G
17 POWDER, FOR SOLUTION ORAL DAILY
Refills: 0 | Status: DISCONTINUED | OUTPATIENT
Start: 2019-06-26 | End: 2019-06-26

## 2019-06-26 RX ORDER — TAMSULOSIN HYDROCHLORIDE 0.4 MG/1
1 CAPSULE ORAL
Qty: 0 | Refills: 0 | DISCHARGE

## 2019-06-26 RX ADMIN — POLYETHYLENE GLYCOL 3350 17 GRAM(S): 17 POWDER, FOR SOLUTION ORAL at 14:45

## 2019-06-26 RX ADMIN — OXYCODONE AND ACETAMINOPHEN 1 TABLET(S): 5; 325 TABLET ORAL at 08:36

## 2019-06-26 RX ADMIN — OXYCODONE AND ACETAMINOPHEN 1 TABLET(S): 5; 325 TABLET ORAL at 12:40

## 2019-06-26 RX ADMIN — OXYCODONE AND ACETAMINOPHEN 1 TABLET(S): 5; 325 TABLET ORAL at 13:15

## 2019-06-26 RX ADMIN — LORATADINE 10 MILLIGRAM(S): 10 TABLET ORAL at 14:45

## 2019-06-26 RX ADMIN — Medication 100 MILLIGRAM(S): at 05:42

## 2019-06-26 RX ADMIN — OXYCODONE AND ACETAMINOPHEN 1 TABLET(S): 5; 325 TABLET ORAL at 09:30

## 2019-06-26 RX ADMIN — Medication 100 MILLIGRAM(S): at 14:45

## 2019-06-26 NOTE — DISCHARGE NOTE NURSING/CASE MANAGEMENT/SOCIAL WORK - NSDCPEWEB_GEN_ALL_CORE
[facility] for Tobacco Control website --- [email or web address] website --- [email or web address]

## 2019-06-26 NOTE — PROGRESS NOTE ADULT - PROBLEM SELECTOR PLAN 2
Patient with significant hypercalcemia of 13.8. Likely 2/2 either malignancy or chemotherapy or both.  - NS rate increased to 125   -As per oncology, added Pamidronate 90mg x 1

## 2019-06-26 NOTE — DISCHARGE NOTE PROVIDER - NSDCCPCAREPLAN_GEN_ALL_CORE_FT
PRINCIPAL DISCHARGE DIAGNOSIS  Diagnosis: Hypercalcemia  Assessment and Plan of Treatment:       SECONDARY DISCHARGE DIAGNOSES  Diagnosis: COPD exacerbation  Assessment and Plan of Treatment:     Diagnosis: Shortness of breath  Assessment and Plan of Treatment:     Diagnosis: Metastatic renal cell carcinoma  Assessment and Plan of Treatment: to brain and lung s/p gammaknife, 3 cycles nipo/ipi PRINCIPAL DISCHARGE DIAGNOSIS  Diagnosis: Hypercalcemia  Assessment and Plan of Treatment: Was treated with IV fluids and improved, please keep hydrated and would followup with Dr. Pedroza and your primary care doctor on the calcium levels. May need further treatment with the hypercalcemia with medications if still elevated or symptomatic. If worsening pain or symptoms please come back to the ED for an evaluation      SECONDARY DISCHARGE DIAGNOSES  Diagnosis: Metastatic renal cell carcinoma  Assessment and Plan of Treatment: to brain and lung s/p gammaknife, 3 cycles nipo/ipi and found to have a progression of his disease in the CT AP and Chest and MRI brain and bone scan. No acute interventions for inpatient but would need outpatient followup with Dr. Pedroza on 7/8 PRINCIPAL DISCHARGE DIAGNOSIS  Diagnosis: Hypercalcemia  Assessment and Plan of Treatment: Was treated with IV fluids and improved, please keep hydrated and would followup with Dr. Pedroza and your primary care doctor on the calcium levels. May need further treatment with the hypercalcemia with medications if still elevated or symptomatic. If worsening pain or symptoms please come back to the ED for an evaluation      SECONDARY DISCHARGE DIAGNOSES  Diagnosis: Metastatic renal cell carcinoma  Assessment and Plan of Treatment: to brain and lung s/p gammaknife, 3 cycles nipo/ipi and found to have a progression of his disease in the CT AP and Chest and MRI brain and bone scan. No acute interventions for inpatient but would need outpatient followup with Dr. Pedroza on 7/18 PRINCIPAL DISCHARGE DIAGNOSIS  Diagnosis: Hypercalcemia  Assessment and Plan of Treatment: Was treated with IV fluids and improved, please keep hydrated and would followup with Dr. Pedroza and your primary care doctor on the calcium levels. May need further treatment with the hypercalcemia with medications if still elevated or symptomatic. If worsening pain or symptoms please come back to the ED for an evaluation      SECONDARY DISCHARGE DIAGNOSES  Diagnosis: Urinary retention  Assessment and Plan of Treatment: Currently has a Barone placed since last visit with urologist Dr. Ritchie Elaine on day before admission. Was scheduled for Barone removal on 6/26, but due to recent hospital course will be discharged with Barone Please follow up with Dr. Elaine for future instruction of Barone. If new/sudden pain with Barone, fever, white/cloudiness of Barone tube, please visit emergency room immediately. Dr. Elaine can be reached at [phone], and his office is located at [address] [place] [address], [address].    Diagnosis: Metastatic renal cell carcinoma  Assessment and Plan of Treatment: to brain and lung s/p gammaknife, 3 cycles nipo/ipi and found to have a progression of his disease in the CT AP and Chest and MRI brain and bone scan. No acute interventions for inpatient but would need outpatient followup with Dr. Pedroza on 7/8

## 2019-06-26 NOTE — PROGRESS NOTE ADULT - PROBLEM SELECTOR PLAN 4
Patient with anemia likely 2/2 malignancy and chemotherapy  - previously unresponsive to 1unit of PRBCs in ED  - Transfused 1u PRBC's  -Trend H/H

## 2019-06-26 NOTE — DISCHARGE NOTE PROVIDER - NSDCFUADDAPPT_GEN_ALL_CORE_FT
Oncology followup appointment on 7/8  with Dr. Pedroza at [facility]: [address] [place] [address]  Phone: [phone]  Fax: [phone]

## 2019-06-26 NOTE — DISCHARGE NOTE NURSING/CASE MANAGEMENT/SOCIAL WORK - NSDCDPATPORTLINK_GEN_ALL_CORE
You can access the [facility] Portal, offered by [facility], by registering with the following website: [email or web address]

## 2019-06-26 NOTE — PROGRESS NOTE ADULT - PROBLEM SELECTOR PLAN 1
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 Follows at [facility]  - Patient still desires treatment; requires restaging of RCC  -CTAP with contrast for restaging of RCC shows progression of neoplastic disease (preliminary report)  -CT chest with contrast to r/o PE and restaging of RCC  - To do: MRI head w/ contrast and whole body bone scan for restaging of RCC  - Percoset PRN  - Consider palliative care consult  -Talk with patient's oncologist for collateral

## 2019-06-26 NOTE — DISCHARGE NOTE PROVIDER - CARE PROVIDER_API CALL
Gaby Pedroza (MD; PhD)  Hematology; Internal Medicine; Medical Oncology  [address] [place] [address]  Phone: [phone]  Fax: [phone]  Follow Up Time:

## 2019-06-26 NOTE — PROGRESS NOTE ADULT - SUBJECTIVE AND OBJECTIVE BOX
***************************************************************  Dr. Marcus Ballesteros, PGY1  Internal Medicine   [facility] Pager:   [facility] Pager:   ***************************************************************    ISIDORO SHOOK  69y  MRN: [id number]    Subjective: No acute events overnight    Patient is a 69y old  Male who presents with a chief complaint of Pain/SOB (25 Jun 2019 10:01)          MEDICATIONS  (STANDING):  docusate sodium 100 milliGRAM(s) Oral three times a day  loratadine 10 milliGRAM(s) Oral daily  sodium chloride 0.9%. 1000 milliLiter(s) (125 mL/Hr) IV Continuous <Continuous>    MEDICATIONS  (PRN):  acetaminophen   Tablet .. 650 milliGRAM(s) Oral every 6 hours PRN Mild Pain (1 - 3)  oxyCODONE    5 mG/acetaminophen 325 mG 1 Tablet(s) Oral every 4 hours PRN Moderate - Severe Pain  senna 2 Tablet(s) Oral at bedtime PRN Constipation        Objective:    Vitals: Vital Signs Last 24 Hrs  T(C): 36.3 (06-26-19 @ 05:12), Max: 36.7 (06-25-19 @ 07:23)  T(F): 97.4 (06-26-19 @ 05:12), Max: 98 (06-25-19 @ 07:23)  HR: 100 (06-26-19 @ 05:12) (85 - 102)  BP: 133/63 (06-26-19 @ 05:12) (104/45 - 136/53)  BP(mean): --  RR: 17 (06-26-19 @ 05:12) (17 - 18)  SpO2: 96% (06-26-19 @ 05:12) (94% - 99%)            I&O's Summary    25 Jun 2019 07:01 - 26 Jun 2019 06:51  --------------------------------------------------------  IN: 0 mL / OUT: 4650 mL / NET: -4650 mL        PHYSICAL EXAM:  GENERAL: NAD  HEAD:  Atraumatic, Normocephalic  EYES: EOMI, PERRLA, conjunctiva and sclera clear  CHEST/LUNG: Clear to percussion bilaterally; No rales, rhonchi, wheezing, or rubs  HEART: Regular rate and rhythm; No murmurs, rubs, or gallops  ABDOMEN: Soft, Nontender, Nondistended; Bowel sounds present  SKIN: No rashes or lesions  NERVOUS SYSTEM:  Alert & Oriented X3, Good concentration; Motor Strength 5/5 B/L upper and lower extremities                                           LABS:  06-25    132<L>  |  99  |  17  ----------------------------<  132<H>  4.3   |  21<L>  |  0.73  06-24    129<L>  |  94<L>  |  16  ----------------------------<  106<H>  3.9   |  23  |  0.73    Ca    12.2<H>      25 Jun 2019 05:30  Ca    13.8<HH>      24 Jun 2019 13:00  Phos  3.4     06-25  Mg     2.2     06-25    TPro  8.1  /  Alb  3.0<L>  /  TBili  0.7  /  DBili  x   /  AST  14  /  ALT  13  /  AlkPhos  306<H>  06-24      PT/INR - ( 24 Jun 2019 13:00 )   PT: 15.6 SEC;   INR: 1.39          PTT - ( 24 Jun 2019 13:00 )  PTT:26.5 SEC                                        7.0    10.04 )-----------( 529      ( 25 Jun 2019 05:30 )             25.3                         6.9    10.58 )-----------( 624      ( 24 Jun 2019 13:00 )             24.8     CAPILLARY BLOOD GLUCOSE          RADIOLOGY & ADDITIONAL TESTS:    Imaging Personally Reviewed:  [ ] YES  [ ] NO      Consultants involved in case:   Consultant(s) Notes Reviewed:  [ ] YES  [ ] NO:   Care Discussed with Consultants/Other Providers [ ] YES  [ ] NO

## 2019-06-26 NOTE — DISCHARGE NOTE NURSING/CASE MANAGEMENT/SOCIAL WORK - NSDCPEEMAIL_GEN_ALL_CORE
[facility] email [email or web address]

## 2019-06-26 NOTE — PROGRESS NOTE ADULT - ATTENDING COMMENTS
Imaging shows progressive of disease throughout the chest, abdomen, and pelvis. Pt has also undergone MRI brain to evaluate known brain met (report pending). Symptomatically improved with good hgb response to PRBCs yesterday. Plan discharge home with analgesia and Onc f/u. Pt wishes to continue treatment per Dr. Pedroza and is not interested in Palliative Care.    Time planning discharge 35 minutes.

## 2019-06-26 NOTE — DISCHARGE NOTE NURSING/CASE MANAGEMENT/SOCIAL WORK - NSDCFUADDAPPT_GEN_ALL_CORE_FT
Oncology followup appointment on 7/8  with Dr. Pedroza at [facility]: [address] [place] [address]  Phone: [phone]  Fax: [phone]

## 2019-06-27 ENCOUNTER — INBOUND DOCUMENT (OUTPATIENT)
Age: 69
End: 2019-06-27

## 2019-06-27 DIAGNOSIS — C79.51 SECONDARY MALIGNANT NEOPLASM OF BONE: ICD-10-CM

## 2019-07-08 ENCOUNTER — APPOINTMENT (OUTPATIENT)
Dept: UROLOGY | Facility: CLINIC | Age: 69
End: 2019-07-08
Payer: COMMERCIAL

## 2019-07-08 ENCOUNTER — OUTPATIENT (OUTPATIENT)
Dept: OUTPATIENT SERVICES | Facility: HOSPITAL | Age: 69
LOS: 1 days | End: 2019-07-08
Payer: COMMERCIAL

## 2019-07-08 ENCOUNTER — APPOINTMENT (OUTPATIENT)
Dept: HEMATOLOGY ONCOLOGY | Facility: CLINIC | Age: 69
End: 2019-07-08

## 2019-07-08 VITALS
HEIGHT: 73 IN | TEMPERATURE: 97.5 F | WEIGHT: 145 LBS | BODY MASS INDEX: 19.22 KG/M2 | SYSTOLIC BLOOD PRESSURE: 108 MMHG | HEART RATE: 101 BPM | DIASTOLIC BLOOD PRESSURE: 55 MMHG | RESPIRATION RATE: 17 BRPM

## 2019-07-08 DIAGNOSIS — Z90.49 ACQUIRED ABSENCE OF OTHER SPECIFIED PARTS OF DIGESTIVE TRACT: Chronic | ICD-10-CM

## 2019-07-08 DIAGNOSIS — R33.8 OTHER RETENTION OF URINE: ICD-10-CM

## 2019-07-08 PROCEDURE — 51702 INSERT TEMP BLADDER CATH: CPT

## 2019-07-08 PROCEDURE — 99213 OFFICE O/P EST LOW 20 MIN: CPT | Mod: 25

## 2019-07-08 NOTE — HISTORY OF PRESENT ILLNESS
[FreeTextEntry1] : 69 year old male w/ metastatic renal cell carcinoma on antineoplastic chemotherapy presents for f/u for acute urinary retention.\par He had an indwelling catheter previously that fell out on 07/05/19.\par Now presents with urinary retention again in addition to significant dribbling of urine.\par Pt requests Barone be reinserted today.\par PVR today was 107 mL.\par RTO in 2 weeks for TOV

## 2019-07-08 NOTE — ASSESSMENT
[FreeTextEntry1] : He had an indwelling catheter previously that fell out on 07/05/19.\par Now presents with urinary retention again in addition to significant dribbling of urine.\par Pt requests Barone be reinserted today.\par PVR today was 107 mL.\par RTO in 2 weeks for TOV

## 2019-07-09 DIAGNOSIS — R33.8 OTHER RETENTION OF URINE: ICD-10-CM

## 2019-07-10 ENCOUNTER — RESULT REVIEW (OUTPATIENT)
Age: 69
End: 2019-07-10

## 2019-07-10 ENCOUNTER — APPOINTMENT (OUTPATIENT)
Dept: HEMATOLOGY ONCOLOGY | Facility: CLINIC | Age: 69
End: 2019-07-10
Payer: COMMERCIAL

## 2019-07-10 VITALS
DIASTOLIC BLOOD PRESSURE: 62 MMHG | WEIGHT: 145.48 LBS | RESPIRATION RATE: 16 BRPM | SYSTOLIC BLOOD PRESSURE: 104 MMHG | HEART RATE: 101 BPM | OXYGEN SATURATION: 96 % | BODY MASS INDEX: 19.2 KG/M2 | TEMPERATURE: 98 F

## 2019-07-10 LAB
BASOPHILS # BLD AUTO: 0 K/UL — SIGNIFICANT CHANGE UP (ref 0–0.2)
BASOPHILS NFR BLD AUTO: 0.1 % — SIGNIFICANT CHANGE UP (ref 0–2)
EOSINOPHIL # BLD AUTO: 0.1 K/UL — SIGNIFICANT CHANGE UP (ref 0–0.5)
EOSINOPHIL NFR BLD AUTO: 1 % — SIGNIFICANT CHANGE UP (ref 0–6)
HCT VFR BLD CALC: 30 % — LOW (ref 39–50)
HGB BLD-MCNC: 8.7 G/DL — LOW (ref 13–17)
LYMPHOCYTES # BLD AUTO: 1.4 K/UL — SIGNIFICANT CHANGE UP (ref 1–3.3)
LYMPHOCYTES # BLD AUTO: 16.8 % — SIGNIFICANT CHANGE UP (ref 13–44)
MCHC RBC-ENTMCNC: 21.9 PG — LOW (ref 27–34)
MCHC RBC-ENTMCNC: 28.9 G/DL — LOW (ref 32–36)
MCV RBC AUTO: 75.6 FL — LOW (ref 80–100)
MONOCYTES # BLD AUTO: 1 K/UL — HIGH (ref 0–0.9)
MONOCYTES NFR BLD AUTO: 12.3 % — SIGNIFICANT CHANGE UP (ref 2–14)
NEUTROPHILS # BLD AUTO: 5.7 K/UL — SIGNIFICANT CHANGE UP (ref 1.8–7.4)
NEUTROPHILS NFR BLD AUTO: 69.7 % — SIGNIFICANT CHANGE UP (ref 43–77)
PLATELET # BLD AUTO: 538 K/UL — HIGH (ref 150–400)
RBC # BLD: 3.96 M/UL — LOW (ref 4.2–5.8)
RBC # FLD: 17.2 % — HIGH (ref 10.3–14.5)
WBC # BLD: 8.2 K/UL — SIGNIFICANT CHANGE UP (ref 3.8–10.5)
WBC # FLD AUTO: 8.2 K/UL — SIGNIFICANT CHANGE UP (ref 3.8–10.5)

## 2019-07-10 PROCEDURE — 99215 OFFICE O/P EST HI 40 MIN: CPT

## 2019-07-10 RX ORDER — CABOZANTINIB 60 MG/1
60 TABLET ORAL
Qty: 30 | Refills: 1 | Status: ACTIVE | COMMUNITY
Start: 2019-07-10 | End: 1900-01-01

## 2019-07-10 NOTE — ASSESSMENT
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 Based on IMDC risk prediction he has leukocytosis, anemia, thrombocytosis and hypercalcemia, time to initiated treatment less than one year, 5/6 factors. He has poor risk mccRCC.  The standard of care is combined immunotherapy nivo and ipi based on CHECKMATE 214 trial with significant survival benefit as compared to sunitinib group. I also mentioned to the patient cabozantinib based on [place] trial with PFS and CARTY benefit as compared with sunitinib.S/p cycle 1 nivo and ipi.  The potential AEs are including but not limited to skin rashes, arthralgia, myositis, colitis, hepatitis, nephritis, pneumonitis, thyroiditis, hypophysitis. S/p gamma knife for his brain lesion. He has fatigue, decreased appetite now. s/p 4 cycles of nivo and ipi,  s/p nivolumab cycle 2. He presented with shortness of breath and pain and hospitalized. Given his progression of disease on the scans he needs to discontinue the current treatment, needs to start cabozantinib based on [employer]eor trial cabozantinib vs sunitinib in the second line setting with significant overall survival benefit. \par \par Plan\par discontinue cycle 3 nivo  for his mRCC\par start cabozantinib 60mg in empty stomach. I have a discussion with him regarding potential AEs, including but not limited to fatigue, hand foot syndrome, liver damage, losing weight, reduced appetite, hypertension, nausea, vomiting, diarrhea, myelosuppression, hemorrhage, RPLS\par check labs\par follow up with Dr. Rivers after gamma knife\par will have MRI brain in one month\par start percocet 5-325 1 tab every 4 to 6 hrs as needed\par RTC in 3 weeks

## 2019-07-10 NOTE — HISTORY OF PRESENT ILLNESS
[Disease: _____________________] : Disease: [unfilled] [T: ___] : T[unfilled] [N: ___] : N[unfilled] [M: ___] : M[unfilled] [AJCC Stage: ____] : AJCC Stage: [unfilled] [de-identified] : Pt is a 68 year old man w/ a PMHx GERD, smoking presenting w/ progressive back pain x4 months, subjective fever and cough/sputum, shortness of breath, was hospitalized to [facility] He states that the back pain is dull, intermittent, and located inferior to his right scapula, and occasional radiated to the side of his chest. Pt states that the pain has gotten worse over the past few months, and it worsens with movement. It frequently awakens him from sleep, but improves with two Advil. Pt also endorses two months of an intermittent cough productive of whitish sputum. Pt also states 30lbs weight loss in last 6 months. He becomes sob on exertion. Pt states he also has a decreased appetite. He also has occasional subjective fevers/chills.  In ED, he was found to have  Alk phos 321, Ca 11.5. CXR showed possible RUL PNA and multiple opacities. CT chest: RUL and LLL lesion, innumerable b/l opacities, hilar lymphadenopathy, b/l adrenal mets. Pt admitted to medical floors for further management and w/u. CT A/P showed an 11.1 cm necrotic mass inseparable from the lower pole of the left kidney; para-aortic lymphadenopathy. Pt followed by oncology team, urology team. Pt w/ decreasing H/H during hospitalization, requiring 1U pRBCs. Started on iron supplementation. Pt had brain MRI which showed left sided frontoparietal metastasis with associated edema. Pt started on decadron 4 mg q6. Neurosurgery recommended outpt SRS. Radiation oncology recommended gamma knife as an outpt. Pt underwent IR biopsy of his lung. \par  [de-identified] : clear cell renal cell carcinoma [de-identified] : S/P cycle 4 nivo and ipi and s/p gamma knife for his brain lesion with Dr. Rivers.\par \par 7/9/19 He was hospitalized in June 2019, presented with presenting with SOB and uncontrolled pain. He was found to have hemoglobin of 6.9 s/p pRBC transfusion and severe hypercalcemia of 13.8 was treated with IV fluids and pamidronate x1, pain was managed with Percocet PRN..  Patient received multimodal whole body imaging for restaging of RCC and progression of metastasis. CT chest with contrast and CT abdomen/pelvis with contrast showed increased lung mass and lymphangitic carcinomatosis. Bone scan showed Diffusely increased activity in posterior right 4th and 5th ribs, suspicious for metastases. MRI head with IV contrast showed a new ring enhancing L parietal lesion 7 x 7 with mild adjacent edema that is at the same level of prior L parietal lobe mass but peripherally and possibilities of either a metastatic focus or posttreatment changes. There is a mild decrease in size of Left parietal lobe metastasis from 6x6 to 5x4 , follow up MRI in 6-8 weeks \par \par He has worsening shortness of breath, states constant pain 5/10, in the right side shoulder blade.  He has not taken any pain meds. He feels tired. His appetite is improving. His energy level is decreased too. He denies diarrhea, skin rashes.

## 2019-07-10 NOTE — REVIEW OF SYSTEMS
[Fatigue] : fatigue [Cough] : cough [Joint Pain] : joint pain [Negative] : Allergic/Immunologic [Shortness Of Breath] : shortness of breath [SOB on Exertion] : shortness of breath during exertion [Fever] : no fever [Wheezing] : no wheezing [Recent Change In Weight] : ~T no recent weight change [Abdominal Pain] : no abdominal pain [Vomiting] : no vomiting [Constipation] : no constipation [Diarrhea] : no diarrhea [FreeTextEntry7] : LUQ discomfort [FreeTextEntry9] : right scapula pain

## 2019-07-17 ENCOUNTER — APPOINTMENT (OUTPATIENT)
Dept: UROLOGY | Facility: CLINIC | Age: 69
End: 2019-07-17

## 2019-07-24 ENCOUNTER — APPOINTMENT (OUTPATIENT)
Dept: INFUSION THERAPY | Facility: HOSPITAL | Age: 69
End: 2019-07-24

## 2019-07-30 ENCOUNTER — OUTPATIENT (OUTPATIENT)
Dept: OUTPATIENT SERVICES | Facility: HOSPITAL | Age: 69
LOS: 1 days | Discharge: HOME | End: 2019-07-30

## 2019-07-30 DIAGNOSIS — Z90.49 ACQUIRED ABSENCE OF OTHER SPECIFIED PARTS OF DIGESTIVE TRACT: Chronic | ICD-10-CM

## 2019-07-30 DIAGNOSIS — C64.9 MALIGNANT NEOPLASM OF UNSPECIFIED KIDNEY, EXCEPT RENAL PELVIS: ICD-10-CM

## 2019-07-31 ENCOUNTER — APPOINTMENT (OUTPATIENT)
Dept: UROLOGY | Facility: CLINIC | Age: 69
End: 2019-07-31

## 2019-08-02 ENCOUNTER — RESULT REVIEW (OUTPATIENT)
Age: 69
End: 2019-08-02

## 2019-08-02 ENCOUNTER — APPOINTMENT (OUTPATIENT)
Dept: HEMATOLOGY ONCOLOGY | Facility: CLINIC | Age: 69
End: 2019-08-02
Payer: MEDICARE

## 2019-08-02 VITALS
TEMPERATURE: 97.9 F | BODY MASS INDEX: 18.34 KG/M2 | WEIGHT: 139 LBS | SYSTOLIC BLOOD PRESSURE: 153 MMHG | RESPIRATION RATE: 14 BRPM | OXYGEN SATURATION: 99 % | DIASTOLIC BLOOD PRESSURE: 83 MMHG | HEART RATE: 90 BPM

## 2019-08-02 DIAGNOSIS — C64.9 MALIGNANT NEOPLASM OF UNSPECIFIED KIDNEY, EXCEPT RENAL PELVIS: ICD-10-CM

## 2019-08-02 DIAGNOSIS — C79.51 SECONDARY MALIGNANT NEOPLASM OF BONE: ICD-10-CM

## 2019-08-02 DIAGNOSIS — G89.3 NEOPLASM RELATED PAIN (ACUTE) (CHRONIC): ICD-10-CM

## 2019-08-02 DIAGNOSIS — M89.8X9 NEOPLASM RELATED PAIN (ACUTE) (CHRONIC): ICD-10-CM

## 2019-08-02 LAB
BASOPHILS # BLD AUTO: 0 K/UL — SIGNIFICANT CHANGE UP (ref 0–0.2)
BASOPHILS NFR BLD AUTO: 0.4 % — SIGNIFICANT CHANGE UP (ref 0–2)
EOSINOPHIL # BLD AUTO: 0.1 K/UL — SIGNIFICANT CHANGE UP (ref 0–0.5)
EOSINOPHIL NFR BLD AUTO: 2.2 % — SIGNIFICANT CHANGE UP (ref 0–6)
HCT VFR BLD CALC: 38.6 % — LOW (ref 39–50)
HGB BLD-MCNC: 11 G/DL — LOW (ref 13–17)
LYMPHOCYTES # BLD AUTO: 1.6 K/UL — SIGNIFICANT CHANGE UP (ref 1–3.3)
LYMPHOCYTES # BLD AUTO: 27.5 % — SIGNIFICANT CHANGE UP (ref 13–44)
MCHC RBC-ENTMCNC: 21.9 PG — LOW (ref 27–34)
MCHC RBC-ENTMCNC: 28.6 G/DL — LOW (ref 32–36)
MCV RBC AUTO: 76.6 FL — LOW (ref 80–100)
MONOCYTES # BLD AUTO: 0.3 K/UL — SIGNIFICANT CHANGE UP (ref 0–0.9)
MONOCYTES NFR BLD AUTO: 5.7 % — SIGNIFICANT CHANGE UP (ref 2–14)
NEUTROPHILS # BLD AUTO: 3.8 K/UL — SIGNIFICANT CHANGE UP (ref 1.8–7.4)
NEUTROPHILS NFR BLD AUTO: 64.3 % — SIGNIFICANT CHANGE UP (ref 43–77)
PLATELET # BLD AUTO: 415 K/UL — HIGH (ref 150–400)
RBC # BLD: 5.04 M/UL — SIGNIFICANT CHANGE UP (ref 4.2–5.8)
RBC # FLD: 18.7 % — HIGH (ref 10.3–14.5)
WBC # BLD: 5.9 K/UL — SIGNIFICANT CHANGE UP (ref 3.8–10.5)
WBC # FLD AUTO: 5.9 K/UL — SIGNIFICANT CHANGE UP (ref 3.8–10.5)

## 2019-08-02 PROCEDURE — 99215 OFFICE O/P EST HI 40 MIN: CPT

## 2019-08-02 RX ORDER — OXYCODONE AND ACETAMINOPHEN 5; 325 MG/1; MG/1
5-325 TABLET ORAL
Qty: 120 | Refills: 0 | Status: ACTIVE | COMMUNITY
Start: 2019-06-05 | End: 1900-01-01

## 2019-08-02 NOTE — ASSESSMENT
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 Based on IMDC risk prediction he has leukocytosis, anemia, thrombocytosis and hypercalcemia, time to initiated treatment less than one year, 5/6 factors. He has poor risk mccRCC.  The standard of care is combined immunotherapy nivo and ipi based on CHECKMATE 214 trial with significant survival benefit as compared to sunitinib group. I also mentioned to the patient cabozantinib based on [place]un trial with PFS and CARTY benefit as compared with sunitinib.S/p cycle 1 nivo and ipi.  The potential AEs are including but not limited to skin rashes, arthralgia, myositis, colitis, hepatitis, nephritis, pneumonitis, thyroiditis, hypophysitis. S/p gamma knife for his brain lesion. He has fatigue, decreased appetite now. s/p 4 cycles of nivo and ipi,  s/p nivolumab cycle 2. He presented with shortness of breath and pain and hospitalized. Given his progression of disease on the scans he needs to discontinue the current treatment, needs to start cabozantinib based on [employer] trial cabozantinib vs sunitinib in the second line setting with significant overall survival benefit. \par \par Plan\par \par continue cabozantinib 60mg in empty stomach. I have a discussion with him regarding potential AEs, including but not limited to fatigue, hand foot syndrome, liver damage, losing weight, reduced appetite, hypertension, nausea, vomiting, diarrhea, myelosuppression, hemorrhage, RPLS\par check labs\par follow up with Dr. Rivers after gamma knife given a new ring enhancing lesion in the brain on June 26 MRI brain\par will have MRI brain now\par continue percocet 5-325 1 tab every 4 to 6 hrs as needed for his bone pain\par RTC in 3 weeks

## 2019-08-02 NOTE — REVIEW OF SYSTEMS
[Fatigue] : fatigue [Shortness Of Breath] : shortness of breath [Cough] : cough [SOB on Exertion] : shortness of breath during exertion [Joint Pain] : joint pain [Negative] : Allergic/Immunologic [Fever] : no fever [Recent Change In Weight] : ~T no recent weight change [Wheezing] : no wheezing [Abdominal Pain] : no abdominal pain [Vomiting] : no vomiting [Constipation] : no constipation [Diarrhea] : no diarrhea [FreeTextEntry7] : LUQ discomfort [FreeTextEntry9] : right scapula pain

## 2019-08-02 NOTE — HISTORY OF PRESENT ILLNESS
[Disease: _____________________] : Disease: [unfilled] [T: ___] : T[unfilled] [N: ___] : N[unfilled] [M: ___] : M[unfilled] [AJCC Stage: ____] : AJCC Stage: [unfilled] [de-identified] : Pt is a 68 year old man w/ a PMHx GERD, smoking presenting w/ progressive back pain x4 months, subjective fever and cough/sputum, shortness of breath, was hospitalized to [facility]. He states that the back pain is dull, intermittent, and located inferior to his right scapula, and occasional radiated to the side of his chest. Pt states that the pain has gotten worse over the past few months, and it worsens with movement. It frequently awakens him from sleep, but improves with two Advil. Pt also endorses two months of an intermittent cough productive of whitish sputum. Pt also states 30lbs weight loss in last 6 months. He becomes sob on exertion. Pt states he also has a decreased appetite. He also has occasional subjective fevers/chills.  In ED, he was found to have  Alk phos 321, Ca 11.5. CXR showed possible RUL PNA and multiple opacities. CT chest: RUL and LLL lesion, innumerable b/l opacities, hilar lymphadenopathy, b/l adrenal mets. Pt admitted to medical floors for further management and w/u. CT A/P showed an 11.1 cm necrotic mass inseparable from the lower pole of the left kidney; para-aortic lymphadenopathy. Pt followed by oncology team, urology team. Pt w/ decreasing H/H during hospitalization, requiring 1U pRBCs. Started on iron supplementation. Pt had brain MRI which showed left sided frontoparietal metastasis with associated edema. Pt started on decadron 4 mg q6. Neurosurgery recommended outpt SRS. Radiation oncology recommended gamma knife as an outpt. Pt underwent IR biopsy of his lung. \par  [de-identified] : clear cell renal cell carcinoma [de-identified] : S/P cycle 4 nivo and ipi and s/p gamma knife for his brain lesion with Dr. Rivers.\par \par 7/9/19 He was hospitalized in June 2019, presented with presenting with SOB and uncontrolled pain. He was found to have hemoglobin of 6.9 s/p pRBC transfusion and severe hypercalcemia of 13.8 was treated with IV fluids and pamidronate x1, pain was managed with Percocet PRN..  Patient received multimodal whole body imaging for restaging of RCC and progression of metastasis. CT chest with contrast and CT abdomen/pelvis with contrast showed increased lung mass and lymphangitic carcinomatosis. Bone scan showed Diffusely increased activity in posterior right 4th and 5th ribs, suspicious for metastases. MRI head with IV contrast showed a new ring enhancing L parietal lesion 7 x 7 with mild adjacent edema that is at the same level of prior L parietal lobe mass but peripherally and possibilities of either a metastatic focus or posttreatment changes. There is a mild decrease in size of Left parietal lobe metastasis from 6x6 to 5x4 , follow up MRI in 6-8 weeks \par \par He has worsening shortness of breath, states constant pain 5/10, in the right side shoulder blade.  He has not taken any pain meds. He feels tired. His appetite is improving. His energy level is decreased too. He denies diarrhea, skin rashes. \par \par 8/2/2019 he started cabozantinib 60mg on July 11, 2019, feels fine, has mild constipation which was resolved by milk,  denies skin rashes, mucositis, abdominal pain, nausea, vomiting, diarrhea. His appetite is improving. He has improving shortness of breath, improving right shoulder blader pain, requiring one percocet 5-325 every 12 hours.

## 2019-08-07 ENCOUNTER — APPOINTMENT (OUTPATIENT)
Dept: HEMATOLOGY ONCOLOGY | Facility: CLINIC | Age: 69
End: 2019-08-07

## 2019-08-15 ENCOUNTER — APPOINTMENT (OUTPATIENT)
Dept: UROLOGY | Facility: CLINIC | Age: 69
End: 2019-08-15

## 2019-08-28 ENCOUNTER — OUTPATIENT (OUTPATIENT)
Dept: OUTPATIENT SERVICES | Facility: HOSPITAL | Age: 69
LOS: 1 days | Discharge: HOME | End: 2019-08-28

## 2019-08-28 DIAGNOSIS — Z90.49 ACQUIRED ABSENCE OF OTHER SPECIFIED PARTS OF DIGESTIVE TRACT: Chronic | ICD-10-CM

## 2019-08-28 DIAGNOSIS — C64.9 MALIGNANT NEOPLASM OF UNSPECIFIED KIDNEY, EXCEPT RENAL PELVIS: ICD-10-CM

## 2019-08-29 PROBLEM — C79.31 BRAIN METASTASES: Status: ACTIVE | Noted: 2019-02-04

## 2019-08-29 PROBLEM — Z79.899 ON ANTINEOPLASTIC CHEMOTHERAPY: Status: ACTIVE | Noted: 2019-02-24

## 2019-08-29 PROBLEM — C79.51 METASTATIC RENAL CELL CARCINOMA TO BONE: Status: ACTIVE | Noted: 2019-02-01

## 2019-08-30 ENCOUNTER — APPOINTMENT (OUTPATIENT)
Dept: HEMATOLOGY ONCOLOGY | Facility: CLINIC | Age: 69
End: 2019-08-30

## 2019-08-30 DIAGNOSIS — C79.51 SECONDARY MALIGNANT NEOPLASM OF BONE: ICD-10-CM

## 2019-08-30 DIAGNOSIS — C79.31 SECONDARY MALIGNANT NEOPLASM OF BRAIN: ICD-10-CM

## 2019-08-30 DIAGNOSIS — C64.9 SECONDARY MALIGNANT NEOPLASM OF BONE: ICD-10-CM

## 2019-08-30 DIAGNOSIS — Z79.899 OTHER LONG TERM (CURRENT) DRUG THERAPY: ICD-10-CM

## 2019-11-08 LAB
ALBUMIN SERPL ELPH-MCNC: 2.7 G/DL
ALBUMIN SERPL ELPH-MCNC: 3 G/DL
ALBUMIN SERPL ELPH-MCNC: 3.2 G/DL
ALP BLD-CCNC: 173 U/L
ALP BLD-CCNC: 222 U/L
ALP BLD-CCNC: 254 U/L
ALT SERPL-CCNC: 15 U/L
ALT SERPL-CCNC: 20 U/L
ALT SERPL-CCNC: 32 U/L
ANION GAP SERPL CALC-SCNC: 10 MMOL/L
ANION GAP SERPL CALC-SCNC: 10 MMOL/L
ANION GAP SERPL CALC-SCNC: 14 MMOL/L
AST SERPL-CCNC: 19 U/L
AST SERPL-CCNC: 23 U/L
AST SERPL-CCNC: 27 U/L
BILIRUB SERPL-MCNC: 0.3 MG/DL
BILIRUB SERPL-MCNC: 0.4 MG/DL
BILIRUB SERPL-MCNC: 0.5 MG/DL
BUN SERPL-MCNC: 11 MG/DL
BUN SERPL-MCNC: 13 MG/DL
BUN SERPL-MCNC: 28 MG/DL
CALCIUM SERPL-MCNC: 10.2 MG/DL
CALCIUM SERPL-MCNC: 9 MG/DL
CALCIUM SERPL-MCNC: 9.9 MG/DL
CHLORIDE SERPL-SCNC: 100 MMOL/L
CHLORIDE SERPL-SCNC: 97 MMOL/L
CHLORIDE SERPL-SCNC: 99 MMOL/L
CO2 SERPL-SCNC: 20 MMOL/L
CO2 SERPL-SCNC: 26 MMOL/L
CO2 SERPL-SCNC: 26 MMOL/L
CORTICOSTEROID BIND GLOBULIN: 2.4 MG/DL
CORTIS SERPL-MCNC: 18 UG/DL
CORTISOL, FREE: 3.2 UG/DL
CREAT SERPL-MCNC: 0.52 MG/DL
CREAT SERPL-MCNC: 0.72 MG/DL
CREAT SERPL-MCNC: 0.78 MG/DL
GLUCOSE SERPL-MCNC: 122 MG/DL
GLUCOSE SERPL-MCNC: 149 MG/DL
GLUCOSE SERPL-MCNC: 94 MG/DL
HBV CORE IGG+IGM SER QL: REACTIVE
HBV CORE IGM SER QL: NONREACTIVE
HBV E AB SER QL: NEGATIVE
HBV E AG SER QL: NEGATIVE
HBV SURFACE AB SER QL: REACTIVE
HBV SURFACE AG SER QL: NONREACTIVE
HCV AB SER QL: NONREACTIVE
HCV S/CO RATIO: 0.24 S/CO
LDH SERPL-CCNC: 173 U/L
LDH SERPL-CCNC: 207 U/L
LDH SERPL-CCNC: 498 U/L
PFCX: 18 %
POTASSIUM SERPL-SCNC: 4.6 MMOL/L
POTASSIUM SERPL-SCNC: 4.8 MMOL/L
POTASSIUM SERPL-SCNC: 4.9 MMOL/L
PROT SERPL-MCNC: 6.7 G/DL
SODIUM SERPL-SCNC: 131 MMOL/L
SODIUM SERPL-SCNC: 135 MMOL/L
SODIUM SERPL-SCNC: 136 MMOL/L
T3FREE SERPL-MCNC: 2.55 PG/ML
T4 FREE SERPL-MCNC: 1.3 NG/DL
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
## 2020-05-27 NOTE — ED PROVIDER NOTE - PSH
Caller would like to discuss a cough from lisinopril. Writer advised caller of callback within 24-72 hours.    Patient Name: Delmar Leyva  Caller Name: self  [facility] of Facility: [facility]  Callback Number: [phone]        Did you confirm the message with the caller?: yes    Thank you,  Kristen Gunn    
Rx for losartan 50 mg po daily sent to the pharmacy    He should come in for a nurse BP check in 3-4 weeks.       Please let the patient know.  Thank you.    
Spoke to patient and he stated that since starting lisinopril in January he has had a dry cough and a tickle that has not gone away. He stated that when he picked up the last Rx refill the pharmacist asked if he had any side effects and was recommended to contact his provider.    He stated that he stopped taking lisinopril one week ago and the cough has gone away.    Please advise, pharmacy pending.  
Spoke to patient regarding Rx and BP check. He verbalized understanding. Nurse BP scheduled  
History of appendectomy

## 2020-09-15 NOTE — ED PROVIDER NOTE - CARE PLAN
Principal Discharge DX:	Back pain  Secondary Diagnosis:	Metastatic renal cell carcinoma  Secondary Diagnosis:	Shortness of breath Principal Discharge DX:	Hypercalcemia  Secondary Diagnosis:	Metastatic renal cell carcinoma  Secondary Diagnosis:	Shortness of breath  Secondary Diagnosis:	COPD exacerbation done

## 2021-06-09 NOTE — ED ADULT NURSE NOTE - NS ED NURSE RECORD ANOTHER VITAL SIGN
Pt unable to respond logically during interview
Yes, record another set of vital signs
"I want to go home"

## 2022-11-22 NOTE — DISCHARGE NOTE PROVIDER - INSTRUCTIONS
1.  You were seen in the ENT Clinic today by . If you have any questions or concerns after your appointment, please call [phone]. Press option #1 for scheduling related needs. Press option #3 for Nurse advice.    2. Plan is to return to clinic 1/24/23 for repeat botox injection      My Adorno LPN  [phone]  [facility] - Otolaryngology        Consistent Carb diet

## 2024-08-16 NOTE — PATIENT PROFILE ADULT - FUNCTIONAL SCREEN CURRENT LEVEL: BATHING, MLM
Detail Level: Generalized
Sunscreen Recommendations: Recommend mineral sunscreens, wide brimmed hat, protective clothing and avoiding direct sunlight during peak hours of the day.
Detail Level: Zone
0 = independent